# Patient Record
Sex: FEMALE | Race: BLACK OR AFRICAN AMERICAN | NOT HISPANIC OR LATINO | Employment: FULL TIME | URBAN - METROPOLITAN AREA
[De-identification: names, ages, dates, MRNs, and addresses within clinical notes are randomized per-mention and may not be internally consistent; named-entity substitution may affect disease eponyms.]

---

## 2022-06-08 ENCOUNTER — OFFICE VISIT (OUTPATIENT)
Dept: URGENT CARE | Facility: CLINIC | Age: 52
End: 2022-06-08
Payer: OTHER MISCELLANEOUS

## 2022-06-08 VITALS
SYSTOLIC BLOOD PRESSURE: 149 MMHG | HEART RATE: 91 BPM | DIASTOLIC BLOOD PRESSURE: 70 MMHG | BODY MASS INDEX: 41.36 KG/M2 | WEIGHT: 205.13 LBS | RESPIRATION RATE: 16 BRPM | HEIGHT: 59 IN | OXYGEN SATURATION: 98 %

## 2022-06-08 DIAGNOSIS — S43.491A OTHER SPRAIN OF RIGHT SHOULDER JOINT, INITIAL ENCOUNTER: Primary | ICD-10-CM

## 2022-06-08 PROCEDURE — 73030 XR SHOULDER TRAUMA 3 VIEW RIGHT: ICD-10-PCS | Mod: RT,S$GLB,, | Performed by: RADIOLOGY

## 2022-06-08 PROCEDURE — 73030 X-RAY EXAM OF SHOULDER: CPT | Mod: RT,S$GLB,, | Performed by: RADIOLOGY

## 2022-06-08 PROCEDURE — 99203 OFFICE O/P NEW LOW 30 MIN: CPT | Mod: S$GLB,,, | Performed by: EMERGENCY MEDICINE

## 2022-06-08 PROCEDURE — 99203 PR OFFICE/OUTPT VISIT, NEW, LEVL III, 30-44 MIN: ICD-10-PCS | Mod: S$GLB,,, | Performed by: EMERGENCY MEDICINE

## 2022-06-08 RX ORDER — NAPROXEN 500 MG/1
500 TABLET ORAL 2 TIMES DAILY WITH MEALS
Qty: 20 TABLET | Refills: 0 | Status: SHIPPED | OUTPATIENT
Start: 2022-06-08 | End: 2022-06-18

## 2022-06-08 RX ORDER — METHOCARBAMOL 500 MG/1
1000 TABLET, FILM COATED ORAL 3 TIMES DAILY
Qty: 42 TABLET | Refills: 0 | Status: SHIPPED | OUTPATIENT
Start: 2022-06-08 | End: 2022-06-15

## 2022-06-08 NOTE — LETTER
Urgent Care - [address] [address]  Phone: [phone]  Fax: [phone]  [facility]: [id number]    Pt Name: Holly Julian  Injury Date: 06/06/2022   Employee ID: [id number] Date of First Treatment: 06/08/2022   Company:[employer]      Appointment Time: 02:45 PM Arrived: 02:45 PM   Provider: Issac Salmon MD Time Out:04:19 PM     Office Treatment:   1. Other sprain of right shoulder joint, initial encounter      Medications Ordered This Encounter   Medications    methocarbamoL (ROBAXIN) 500 MG Tab    naproxen (NAPROSYN) 500 MG tablet      Patient Instructions: Attention not to aggravate affected area, Daily home exercises/warm soaks, Apply ice 24-48 hours then apply heat/warm soaks    Restrictions: No lifting/pushing/pulling more than 10 lbs, No above the shoulder/overhead work, Limited use of right hand and arm, No driving company vehicles     Return Appointment: 6/15/2022 at 10:00 AM    BG
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
## 2022-06-15 ENCOUNTER — OFFICE VISIT (OUTPATIENT)
Dept: URGENT CARE | Facility: CLINIC | Age: 52
End: 2022-06-15
Payer: OTHER MISCELLANEOUS

## 2022-06-15 VITALS
SYSTOLIC BLOOD PRESSURE: 141 MMHG | DIASTOLIC BLOOD PRESSURE: 54 MMHG | HEART RATE: 98 BPM | RESPIRATION RATE: 16 BRPM | OXYGEN SATURATION: 99 %

## 2022-06-15 DIAGNOSIS — M25.531 PAIN IN WRIST, RIGHT: ICD-10-CM

## 2022-06-15 DIAGNOSIS — S43.491D OTHER SPRAIN OF RIGHT SHOULDER JOINT, SUBSEQUENT ENCOUNTER: Primary | ICD-10-CM

## 2022-06-15 DIAGNOSIS — M25.529 ARTHRALGIA OF UPPER ARM, UNSPECIFIED LATERALITY: ICD-10-CM

## 2022-06-15 PROCEDURE — 99214 PR OFFICE/OUTPT VISIT, EST, LEVL IV, 30-39 MIN: ICD-10-PCS | Mod: S$GLB,,, | Performed by: NURSE PRACTITIONER

## 2022-06-15 PROCEDURE — 99214 OFFICE O/P EST MOD 30 MIN: CPT | Mod: S$GLB,,, | Performed by: NURSE PRACTITIONER

## 2022-06-15 RX ORDER — PREDNISONE 20 MG/1
40 TABLET ORAL DAILY
Qty: 8 TABLET | Refills: 0 | Status: SHIPPED | OUTPATIENT
Start: 2022-06-15 | End: 2022-06-19
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
## 2022-06-15 NOTE — LETTER
Urgent Care - [address] [address]  Phone: [phone]  Fax: [phone]  [facility]: [id number]    Pt Name: Holly Julian  Injury Date: 06/06/2022   Employee ID:  Date of First Treatment: 06/15/2022   Company: [employer] [facility] [employer]      Appointment Time: 10:00 AM Arrived: 09:45 AM   Provider: ELLIS Yepez Time Out: 10:45 AM     Office Treatment:   1. Other sprain of right shoulder joint, subsequent encounter    2. Arthralgia of upper arm, unspecified laterality    3. Pain in wrist, right      Medications Ordered This Encounter   Medications    predniSONE (DELTASONE) 20 MG tablet      Patient Instructions: Attention not to aggravate affected area, Daily home exercises/warm soaks, Apply ice 24-48 hours then apply heat/warm soaks, Elevated affected area    Restrictions: No lifting/pushing/pulling more than 10 lbs, Avoid frequent bending/lifting/twisting, No driving company vehicles, No above the shoulder/overhead work, Limited use of right hand and arm     Return Appointment: 6/22/2022 at 12:30 pm    BG

## 2022-06-22 ENCOUNTER — OFFICE VISIT (OUTPATIENT)
Dept: URGENT CARE | Facility: CLINIC | Age: 52
End: 2022-06-22
Payer: OTHER MISCELLANEOUS

## 2022-06-22 DIAGNOSIS — M25.529 ARTHRALGIA OF UPPER ARM, UNSPECIFIED LATERALITY: ICD-10-CM

## 2022-06-22 DIAGNOSIS — S43.491D OTHER SPRAIN OF RIGHT SHOULDER JOINT, SUBSEQUENT ENCOUNTER: Primary | ICD-10-CM

## 2022-06-22 PROCEDURE — 99214 PR OFFICE/OUTPT VISIT, EST, LEVL IV, 30-39 MIN: ICD-10-PCS | Mod: S$GLB,,, | Performed by: EMERGENCY MEDICINE

## 2022-06-22 PROCEDURE — 99214 OFFICE O/P EST MOD 30 MIN: CPT | Mod: S$GLB,,, | Performed by: EMERGENCY MEDICINE

## 2022-06-22 RX ORDER — NAPROXEN 500 MG/1
500 TABLET ORAL 2 TIMES DAILY WITH MEALS
Qty: 20 TABLET | Refills: 0 | Status: SHIPPED | OUTPATIENT
Start: 2022-06-22 | End: 2022-07-06

## 2022-06-22 RX ORDER — METHOCARBAMOL 500 MG/1
500 TABLET, FILM COATED ORAL 3 TIMES DAILY
Qty: 30 TABLET | Refills: 0 | Status: SHIPPED | OUTPATIENT
Start: 2022-06-22 | End: 2022-07-02

## 2022-06-22 NOTE — LETTER
Urgent Care - [address] [address]  Phone: [phone]  Fax: [phone]  [facility]: [id number]    Pt Name: Holly Julian  Injury Date: 06/06/2022   Employee ID: [id number] Date of  Treatment: 06/22/2022   Company: [employer]      Appointment Time: 12:30 PM Arrived: 12:25 PM   Provider: Issac Salmon MD Time Out: 2:37 PM     Office Treatment:   1. Other sprain of right shoulder joint, subsequent encounter    2. Arthralgia of upper arm, unspecified laterality      Medications Ordered This Encounter   Medications    methocarbamoL (ROBAXIN) 500 MG Tab    naproxen (NAPROSYN) 500 MG tablet      Patient Instructions: Attention not to aggravate affected area, Daily home exercises/warm soaks, Apply ice 24-48 hours then apply heat/warm soaks, Elevated affected area    Restrictions: No lifting/pushing/pulling more than 10 lbs, No above the shoulder/overhead work, Limited use of right hand and arm. No driving.     Return Appointment: 7/6/2022 at 10:30 AM

## 2022-06-22 NOTE — LETTER
Urgent Care - [address] [address]  Phone: [phone]  Fax: [phone]  [facility]: [id number]    Pt Name: Holly Julian  Injury Date: 06/06/2022   Employee ID:[id number] Date of Treatment: 06/22/2022   Company:[employer]      Appointment Time: 12:30 PM Arrived: 12:25 PM    Provider: Issac Salmon MD Time Out: 2:37 PM     Office Treatment:   1. Other sprain of right shoulder joint, subsequent encounter    2. Arthralgia of upper arm, unspecified laterality      Medications Ordered This Encounter   Medications    methocarbamoL (ROBAXIN) 500 MG Tab    naproxen (NAPROSYN) 500 MG tablet      Patient Instructions: Attention not to aggravate affected area, Daily home exercises/warm soaks, Apply ice 24-48 hours then apply heat/warm soaks, Elevated affected area    Restrictions: No lifting/pushing/pulling more than 10 lbs, No above the shoulder/overhead work, Limited use of right hand and arm     Return Appointment: 7/6/2022 at 10:30 AM

## 2022-06-22 NOTE — PROGRESS NOTES
Subjective:       Patient ID: Holly Julian is a 52 y.o. female.    Chief Complaint: Arm Injury (Right wrist)    RV, Right Shoulder,06/06/22, Joseph. Patient is still having throbbing pain but it isn't constant. Patient states the pain can come with or without movements. Patient states she is taking medicine for injury but can not recall what she is taking. Patient states the pain radiates down to the wrist. Patient still can not lift the right arm up as much.  Pain level 7/10 today.    Reports significant improvement however not complete resolution.  She has been working light duty without difficulty.  Extremes of range of motion and any heavy lifting does cause aching pain to the anteromedial aspect of the shoulder girdle.  No pain on external or internal rotation and do not feel labrum rotator cuff pathology involved.  I have reviewed the x-rays and are negative for acute bony abnormality.  Will refill anti-inflammatory and nonsedating muscle relaxer and have her follow-up in 2 weeks.  Discussed at that point that she would either be discharged or started with physical therapy depending on her progress.  Return to clinic 2 weeks.    Arm Injury   The incident occurred more than 1 week ago. The incident occurred at work. The injury mechanism was twisted. The pain is present in the right shoulder and right wrist. Quality: throbbing. The pain radiates to the right hand. The pain is at a severity of 7/10. The pain is mild. The pain has been intermittent since the incident. Pertinent negatives include no chest pain, muscle weakness, numbness or tingling. The symptoms are aggravated by movement. She has tried ice and heat for the symptoms. The treatment provided mild relief.       ROS    Constitution: Negative for chills, fatigue and fever.   HENT: Negative for ear pain, sinus pain and sore throat.    Neck: Negative for neck pain and neck stiffness.   Cardiovascular: Negative for chest pain, palpitations and sob on  exertion.   Eyes: Negative for eye pain and vision loss.   Respiratory: Negative for cough, shortness of breath and asthma.    Gastrointestinal: Negative for abdominal pain, nausea, vomiting and diarrhea.   Genitourinary: Negative for dysuria, frequency and hematuria.   Musculoskeletal: Positive for pain and abnormal ROM of joint. Negative for trauma, joint pain, joint swelling, arthritis, gout, back pain, muscle cramps, muscle ache and history of spine disorder.   Skin: Negative for rash, wound and erythema.   Allergic/Immunologic: Negative for seasonal allergies and asthma.   Neurological: Negative for dizziness, light-headedness, altered mental status, numbness and tingling.   Psychiatric/Behavioral: Negative for altered mental status and confusion.        Objective:      Physical Exam  Vitals and nursing note reviewed.   Constitutional:       Appearance: She is well-developed.   HENT:      Head: Normocephalic and atraumatic. No abrasion, contusion or laceration.      Right Ear: External ear normal.      Left Ear: External ear normal.      Nose: Nose normal.   Eyes:      General: Lids are normal.      Conjunctiva/sclera: Conjunctivae normal.      Pupils: Pupils are equal, round, and reactive to light.   Neck:      Trachea: Trachea and phonation normal.   Cardiovascular:      Rate and Rhythm: Normal rate and regular rhythm.      Heart sounds: Normal heart sounds.   Pulmonary:      Effort: Pulmonary effort is normal. No respiratory distress.      Breath sounds: Normal breath sounds. No stridor.   Musculoskeletal:         General: Tenderness and signs of injury present. No swelling.      Right shoulder: Tenderness present. Decreased range of motion.      Left shoulder: Normal.      Right wrist: Tenderness (worse with flexion and extension) present.      Cervical back: Full passive range of motion without pain and neck supple.   Skin:     General: Skin is warm and dry.      Capillary Refill: Capillary refill takes  less than 2 seconds.      Findings: No abrasion, bruising, burn, ecchymosis, erythema, laceration, lesion or rash.   Neurological:      Mental Status: She is alert and oriented to person, place, and time.   Psychiatric:         Speech: Speech normal.         Behavior: Behavior normal.         Thought Content: Thought content normal.         Judgment: Judgment normal.         Assessment:       1. Other sprain of right shoulder joint, subsequent encounter    2. Arthralgia of upper arm, unspecified laterality        Plan:       Reports significant improvement however not complete resolution.  She has been working light duty without difficulty.  Extremes of range of motion and any heavy lifting does cause aching pain to the anteromedial aspect of the shoulder girdle.  No pain on external or internal rotation and do not feel labrum rotator cuff pathology involved.  I have reviewed the x-rays and are negative for acute bony abnormality.  Will refill anti-inflammatory and nonsedating muscle relaxer and have her follow-up in 2 weeks.  Discussed at that point that she would either be discharged or started with physical therapy depending on her progress.  Return to clinic 2 weeks.  Medications Ordered This Encounter   Medications    methocarbamoL (ROBAXIN) 500 MG Tab     Sig: Take 1 tablet (500 mg total) by mouth 3 (three) times daily. for 10 days     Dispense:  30 tablet     Refill:  0    naproxen (NAPROSYN) 500 MG tablet     Sig: Take 1 tablet (500 mg total) by mouth 2 (two) times daily with meals. for 14 days     Dispense:  20 tablet     Refill:  0     Patient Instructions: Attention not to aggravate affected area, Daily home exercises/warm soaks, Apply ice 24-48 hours then apply heat/warm soaks, Elevated affected area   Restrictions: No lifting/pushing/pulling more than 10 lbs, No above the shoulder/overhead work, Limited use of right hand and arm  Follow up in about 2 weeks (around 7/6/2022).

## 2022-07-06 ENCOUNTER — OFFICE VISIT (OUTPATIENT)
Dept: URGENT CARE | Facility: CLINIC | Age: 52
End: 2022-07-06
Payer: OTHER MISCELLANEOUS

## 2022-07-06 VITALS
BODY MASS INDEX: 41.33 KG/M2 | RESPIRATION RATE: 16 BRPM | HEIGHT: 59 IN | DIASTOLIC BLOOD PRESSURE: 62 MMHG | SYSTOLIC BLOOD PRESSURE: 146 MMHG | OXYGEN SATURATION: 98 % | HEART RATE: 87 BPM | WEIGHT: 205 LBS

## 2022-07-06 DIAGNOSIS — M25.531 PAIN IN WRIST, RIGHT: ICD-10-CM

## 2022-07-06 DIAGNOSIS — Z02.6 ENCOUNTER RELATED TO WORKER'S COMPENSATION CLAIM: Primary | ICD-10-CM

## 2022-07-06 DIAGNOSIS — S43.491D OTHER SPRAIN OF RIGHT SHOULDER JOINT, SUBSEQUENT ENCOUNTER: ICD-10-CM

## 2022-07-06 DIAGNOSIS — M25.529 ARTHRALGIA OF UPPER ARM, UNSPECIFIED LATERALITY: ICD-10-CM

## 2022-07-06 PROCEDURE — 99214 OFFICE O/P EST MOD 30 MIN: CPT | Mod: S$GLB,,, | Performed by: NURSE PRACTITIONER

## 2022-07-06 PROCEDURE — 99214 PR OFFICE/OUTPT VISIT, EST, LEVL IV, 30-39 MIN: ICD-10-PCS | Mod: S$GLB,,, | Performed by: NURSE PRACTITIONER
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
## 2022-07-06 NOTE — LETTER
Urgent Care - [address] [address]  Phone: [phone]  Fax: [phone]  [facility] [employer] [facility]: [id number]    Pt Name: Holly Julian  Injury Date: 06/06/2022   Employee ID: [id number] Date of Treatment: 07/06/2022   Company: [employer]      Appointment Time: 10:30 AM Arrived: 10:26 AM   Provider: ELLIS Yepez Time Out: 11:15 AM     Office Treatment:   1. Encounter related to worker's compensation claim    2. Other sprain of right shoulder joint, subsequent encounter    3. Arthralgia of upper arm, unspecified laterality    4. Pain in wrist, right          Patient Instructions: Attention not to aggravate affected area, Daily home exercises/warm soaks, Apply ice 24-48 hours then apply heat/warm soaks, PT to be scheduled once authorized    Restrictions: Regular Duty (on 7/11/2022)     Return Appointment: 7/20/2022 at 09:00 AM

## 2022-07-13 ENCOUNTER — CLINICAL SUPPORT (OUTPATIENT)
Dept: REHABILITATION | Facility: OTHER | Age: 52
End: 2022-07-13
Payer: OTHER MISCELLANEOUS

## 2022-07-13 DIAGNOSIS — M25.611 DECREASED RIGHT SHOULDER RANGE OF MOTION: ICD-10-CM

## 2022-07-13 DIAGNOSIS — M25.511 ACUTE PAIN OF RIGHT SHOULDER: ICD-10-CM

## 2022-07-13 DIAGNOSIS — M62.81 MUSCLE WEAKNESS OF UPPER EXTREMITY: ICD-10-CM

## 2022-07-13 PROCEDURE — 97161 PT EVAL LOW COMPLEX 20 MIN: CPT | Mod: PN

## 2022-07-14 ENCOUNTER — CLINICAL SUPPORT (OUTPATIENT)
Dept: REHABILITATION | Facility: OTHER | Age: 52
End: 2022-07-14
Payer: OTHER MISCELLANEOUS

## 2022-07-14 DIAGNOSIS — M62.81 MUSCLE WEAKNESS OF UPPER EXTREMITY: ICD-10-CM

## 2022-07-14 DIAGNOSIS — M25.611 DECREASED RIGHT SHOULDER RANGE OF MOTION: ICD-10-CM

## 2022-07-14 DIAGNOSIS — M25.511 ACUTE PAIN OF RIGHT SHOULDER: Primary | ICD-10-CM

## 2022-07-14 PROCEDURE — 97110 THERAPEUTIC EXERCISES: CPT | Mod: PN,CQ

## 2022-07-14 PROCEDURE — 97140 MANUAL THERAPY 1/> REGIONS: CPT | Mod: PN,CQ

## 2022-07-14 NOTE — PLAN OF CARE
[facility]  Physical Therapy Initial Evaluation    Name: Holly Julian  [facility] Number: [id number]    Therapy Diagnosis:   Encounter Diagnoses   Name Primary?    Acute pain of right shoulder     Decreased right shoulder range of motion     Muscle weakness of upper extremity      Physician: Alanna Marquez FNP    Physician Orders: PT Eval and Treat   Medical Diagnosis from Referral:   S43.491D (ICD-10-CM) - Other sprain of right shoulder joint, subsequent encounter  M25.529 (ICD-10-CM) - Arthralgia of upper arm, unspecified laterality  M25.531 (ICD-10-CM) - Pain in wrist, right  Evaluation Date: 2022  Authorization Period Expiration: 2023  Plan of Care Expiration: 2022  Progress Note Due: 8/10/2022  Visit # / Visits authorized:     Time In: 3:10 pm  Time Out: 3:50 pm  Total BillableTime separate from evaluation: 00 minutes    Precautions: Standard    Subjective   Date of injury: 2022  History of current condition - Holly reports: injuring her R UE while driving at work and the steering wheel locked on her as as she was turning the wheel. 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    Allergies:   Review of patient's allergies indicates:  No Known Allergies     Imagin2022  X-ray  FINDINGS:  The bone mineralization is within normal limits.  There is no cortical step-off.  There is no evidence of periostitis.     The glenohumeral articulation is maintained.  There is  "arthropathy of the acromioclavicular joint.  The coracoclavicular interval is within normal limits.     The visualized right hemithorax is unremarkable.  There is no evidence of a pneumothorax or pulmonary contusion.    Social History:  lives with their spouse    Pain:  Current 4/10, worst 8/10, best 3/10   Location: right shoulder(s)  Description: Aching   Aggravating Factors: reaching up, reaching forward, reaching behind her back, dressing, tucking shirt into pants, and carrying.  Alleviating Factors: ice, heating pad and analgesic rub, Meloxicam, muscle relaxers    Pt's goals: "get to 90% to 100% moving this arm. Get it back to where it was."             Objective       Functional Job Specific Testing:     Job Specific Task Job Demands Current Ability Deficit? (Yes or No)   1. Turning the steering wheel continuous With pain yes   2. Reach forward for door controls continuous Not currently performing this yes   3. Sitting continuous Currently performing yes       UE MMT R L   C3 Cervical side bend 5/5 5/5   C4 Shoulder Shrug 5/5  5/5   Shoulder flexion 3/5 5/5   Shoulder abduction 3-/5 5/5   Shoulder IR 5/5 5/5   Shoulder ER 5/5 5/5   C5 Elbow flexion 5/5 5/5   C7 Elbow extension 5/5 5/5   C6 Wrist extension 5/5 5/5   Wrist flexion 5/5 5/5   Scapular protraction 3/5 4/5   Mid Traps 3-/5 3-/5   Lower traps 3-/5 3-/5     Joint Mobility:                                                  R                        L  Shoulder flexion                                 130 deg/150 deg           full  Shoulder abd                                     110 deg/170 deg           full  Shoulder ER                                             65 deg                  75 deg    Shoulder IR                                              40 deg                  60 deg  Shoulder ER at 90 deg abd                      75 deg                  90 deg      UE Special Tests    [place]-Luis negative   Neer Impingement Positive R UE "   Bell's negative   Empty Can negative       Limitation/Restriction for FOTO Shoulder Survey    Therapist reviewed FOTO scores for Holly Julian on 7/13/2022.   FOTO documents entered into [employer] - see Media section.    Limitation Score: 60%       TREATMENT     Home Exercises and Patient Education Provided    Education provided:   - Discussed the role of the PTA on the Rehab Team. Discussed patient will be seen by a physical therapist minimally every 6th visit or every 30 days prior to being seen by PTA. Also discussed the use of the [facility] for communication.      Assessment   Holly is a 52 y.o. female referred to outpatient Physical Therapy with a medical diagnosis of S43.491D (ICD-10-CM) - Other sprain of right shoulder joint, subsequent encounter, M25.529 (ICD-10-CM) - Arthralgia of upper arm, unspecified laterality, M25.531 (ICD-10-CM) - Pain in wrist, right. Pt presents with positive testing for R shoulder impingment affecting R shoulder ROM, presenting with decreased scapular stabilization and strength affecting R shoulder AROM and function. 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    The patient's current job specific task deficits include the following: turing a steering wheel, sitting and reaching forward, reaching overhead, assisting students in wheelchairs.    Pt prognosis is Good.     Skilled Physical Therapy intervention is required at this time for the injured worker to address the musculoskeletal limitations and work-related functional deficits for their job as a .    Plan of care discussed with patient: Yes  Pt's spiritual, cultural and educational needs considered and patient is agreeable to the plan of care and goals as stated below:     Anticipated Barriers for therapy: cannot come on Mondays    Medical Necessity is demonstrated by the  following  History  Co-morbidities and personal factors that may impact the plan of care Co-morbidities:   none    Personal Factors:   no deficits     low   Examination  Body Structures and Functions, activity limitations and participation restrictions that may impact the plan of care Body Regions:   upper extremities    Body Systems:    ROM  strength  gross coordinated movement    Participation Restrictions:   none    Activity limitations:   Learning and applying knowledge  no deficits    General Tasks and Commands  no deficits    Communication  no deficits    Mobility  lifting and carrying objects  moving around using equipment (WC)    Self care  no deficits    Domestic Life  no deficits    Interactions/Relationships  no deficits    Life Areas  no deficits    Community and Social Life  no deficits         moderate   Clinical Presentation stable and uncomplicated low   Decision Making/ Complexity Score: low     Goals:     Short Term Goals: 2 weeks   1. Increase R shoulder AROM to 160 deg for patient to reach overhead to press buttons in the shuttle bus at work.  2. Increased R shoulder strength to 4/5 for patient to reach buttons and dials in the shuttle bus at work.    Long Term Goals: 4 weeks   3. Patient to freely turning steering wheel of the shuttle bus without restrictions  4. 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    Plan   Plan of care Certification: 7/13/2022 to 9/7/2022.    Outpatient Physical Therapy 3 times weekly for 8 weeks to include the following interventions: Manual Therapy, Moist Heat/ Ice, Neuromuscular Re-ed, Patient Education, Therapeutic Activities and Therapeutic Exercise.     Upon discharge from further skilled PT intervention it will determined if the need for a work conditioning program or Functional Capacity Evaluation is required to allow the injured worker to return to work with full potential achieved, continued improvement with body mechanics with advanced  functional activities, and further prevention of future work-related injuries.     Jose Wagoner, PT  7/14/2022

## 2022-07-14 NOTE — PROGRESS NOTES
"[facility]   Physical Therapy Treatment Note     Name: Holly HEREDIA [facility] Number: [id number]    Therapy Diagnosis:   Encounter Diagnoses   Name Primary?    Acute pain of right shoulder Yes    Decreased right shoulder range of motion     Muscle weakness of upper extremity      Physician: Alanna Marquez FNP    Visit Date: 7/14/2022         Physician Orders: PT Eval and Treat   Medical Diagnosis from Referral:   S43.491D (ICD-10-CM) - Other sprain of right shoulder joint, subsequent encounter  M25.529 (ICD-10-CM) - Arthralgia of upper arm, unspecified laterality  M25.531 (ICD-10-CM) - Pain in wrist, right  Evaluation Date: 7/13/2022  Authorization Period Expiration: 7/6/2023  Plan of Care Expiration: 9/7/2022  Progress Note Due: 8/10/2022  Visit # / Visits authorized: 2/ 1  PTA Visit #: 1/5     Time In: 3:45 PM   Time Out: 4:30 PM  Total Billable Time: 40 minutes    SUBJECTIVE     Pt reports: "sometimes I get shooting pain, but my shoulder is just aching today". Pt agreeable to PT session   She will be compliant with home exercise program.  Response to previous treatment: initial evaluation previous session   Functional change: none reported    Pain: 7/10 (ache)  Location: right shoulder (lateral / anterior)    OBJECTIVE     Objective Measures updated at progress report unless specified.     Treatment     Holly received the treatments listed below:      Holly participated therapeutic exercises to develop strength, ROM and flexibility for 25  minutes including:  -Wand      Flexion  2x10 1# wand   Chest press 2x10 1# wand   R ER  2x10 1# wand  -Pulleys flex/abd 2 min ea  -Wall washing  Fwd with towel, x15 Flex    Holly received manual therapy techniques: Joint mobilizations and Soft tissue massage were applied to the: R shoulder for 15 minutes, including:  STM to R UT  STM to biceps long head and general anterior shoulder  Lateral R shoulder telescoping  PROM all planes tolerable "   GHT jt mobilization (anterior, lateral, inferior) grade III    Patient Education and Home Exercises     Home Exercises Provided and Patient Education Provided     Education provided:   -shoulder WHEP    Written Home Exercises Provided: yes. Exercises were reviewed and Holly was able to demonstrate them prior to the end of the session.  Holly demonstrated good  understanding of the education provided. See EMR under Patient Instructions for exercises provided during therapy sessions    ASSESSMENT     Pt completed first session after initial evaluation today. She was provided with verbal/ tactile instructions for proper performance of supine/ standing activities. She did not report any complaints of increased R shoulder pain at end of today's session. Pt with tenderness to palpation R UT and anterior R shoulder (over long head of biceps mm)    Holly Is progressing well towards her goals.   Pt prognosis is Good.     Pt will continue to benefit from skilled outpatient physical therapy to address the deficits listed in the problem list box on initial evaluation, provide pt/family education and to maximize pt's level of independence in the home and community environment.     Pt's spiritual, cultural and educational needs considered and pt agreeable to plan of care and goals.     Anticipated barriers to physical therapy: see precautions    Goals:   Short Term Goals: 2 weeks   1. 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  2. Increased R shoulder strength to 4/5 for patient to reach buttons and dials in the shuttle bus at work.(ongoing, not met).     Long Term Goals: 4 weeks   3. Patient to freely turning steering wheel of the shuttle bus without restrictions.(ongoing, not met)  4. 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 Monitor  response to session and advance as appropriate.    Bert Subramanian, PTA

## 2022-07-19 ENCOUNTER — CLINICAL SUPPORT (OUTPATIENT)
Dept: REHABILITATION | Facility: OTHER | Age: 52
End: 2022-07-19
Payer: OTHER MISCELLANEOUS

## 2022-07-19 DIAGNOSIS — M25.511 ACUTE PAIN OF RIGHT SHOULDER: Primary | ICD-10-CM

## 2022-07-19 DIAGNOSIS — M62.81 MUSCLE WEAKNESS OF UPPER EXTREMITY: ICD-10-CM

## 2022-07-19 DIAGNOSIS — M25.611 DECREASED RIGHT SHOULDER RANGE OF MOTION: ICD-10-CM

## 2022-07-19 PROCEDURE — 97140 MANUAL THERAPY 1/> REGIONS: CPT | Mod: PN

## 2022-07-19 PROCEDURE — 97110 THERAPEUTIC EXERCISES: CPT | Mod: PN

## 2022-07-19 NOTE — PROGRESS NOTES
"[facility]   Physical Therapy Treatment Note     Name: Holly Julian  [facility] Number: [id number]    Therapy Diagnosis:   Encounter Diagnoses   Name Primary?    Acute pain of right shoulder Yes    Decreased right shoulder range of motion     Muscle weakness of upper extremity      Physician: Alanna Marquez FNP    Visit Date: 7/19/2022     Physician Orders: PT Eval and Treat   Medical Diagnosis from Referral:   S43.491D (ICD-10-CM) - Other sprain of right shoulder joint, subsequent encounter  M25.529 (ICD-10-CM) - Arthralgia of upper arm, unspecified laterality  M25.531 (ICD-10-CM) - Pain in wrist, right  Evaluation Date: 7/13/2022  Authorization Period Expiration: 7/6/2023  Plan of Care Expiration: 9/7/2022  Progress Note Due: 8/10/2022  Visit # / Visits authorized: 3/9  PTA Visit #: 1/5     Time In: 04:01pm  Time Out: 05:05pm  Total Billable Time: 64 minutes    SUBJECTIVE     HOME EXERCISE PROGRAM: no comment   Response to previous treatment: R shoulder remains painful and L shoulder feels stiff  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     Pain: 7/10 (ache)  Location: right shoulder (lateral / anterior)    OBJECTIVE     Objective Measures updated at progress report unless specified.     Treatment   Charges based on 1-1 tx:  therapeutic exercises for 43 minutes:  -Wand      Flexion  2x10 1# wand, 5" hold   Chest press 3x10 1# wand   R ER  2x10 1# wand, 5" hold  -Pulleys flex/abd: 3min   -Wall washing: Abd semi [place] with furniture slider on wall x20 each arm   - Patient education on orienting thumb upwards while reaching for work tasks     manual therapy techniques for 15 minutes:  R/L GLENOHUMERAL JOINT Gr III/IV JM     Therapeutic activities for 6 minutes:   +UBE, forward 3'/ retro 3', 1.5 resistance     Patient Education and Home Exercises     Home Exercises Provided and Patient " Education Provided     Education provided:   yes    Home Exercises Provided: yes. Exercises were reviewed and Holly was able to demonstrate them prior to the end of the session.  Holly demonstrated good  understanding of the education provided. See EMR under Patient Instructions for exercises provided during therapy sessions    ASSESSMENT   Patient requires verbal/ tactile cuing for proper performance of supine/ standing exercises but she is able to complete with no adverse s/s at her shoulders. UBE and abduction wall slides initiated today to progress work related strength and mobility training. Patient presents with springy/leathery end feel at R GLENOHUMERAL JOINT during manual intervention.     Holly is making good progress towards meeting set goals.    Pt prognosis is Good.     Pt will continue to benefit from skilled outpatient physical therapy to address the deficits listed in the problem list box on initial evaluation, provide pt/family education and to maximize pt's level of independence in the home and community environment.     Pt's spiritual, cultural and educational needs considered and pt agreeable to plan of care and goals.     Anticipated barriers to physical therapy: see precautions    Goals:   Short Term Goals: 2 weeks   1. 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  2. Increased R shoulder strength to 4/5 for patient to reach buttons and dials in the shuttle bus at work.(ongoing, not met).     Long Term Goals: 4 weeks   3. Patient to freely turning steering wheel of the shuttle bus without restrictions.(ongoing, not met)  4. 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 Monitor response to session and advance as appropriate.    Joanne Duke, PT

## 2022-07-20 ENCOUNTER — OFFICE VISIT (OUTPATIENT)
Dept: URGENT CARE | Facility: CLINIC | Age: 52
End: 2022-07-20
Payer: OTHER MISCELLANEOUS

## 2022-07-20 DIAGNOSIS — M25.531 PAIN IN WRIST, RIGHT: ICD-10-CM

## 2022-07-20 DIAGNOSIS — S43.491D OTHER SPRAIN OF RIGHT SHOULDER JOINT, SUBSEQUENT ENCOUNTER: ICD-10-CM

## 2022-07-20 DIAGNOSIS — Z02.6 ENCOUNTER RELATED TO WORKER'S COMPENSATION CLAIM: Primary | ICD-10-CM

## 2022-07-20 DIAGNOSIS — M25.529 ARTHRALGIA OF UPPER ARM, UNSPECIFIED LATERALITY: ICD-10-CM

## 2022-07-20 PROCEDURE — 99214 OFFICE O/P EST MOD 30 MIN: CPT | Mod: S$GLB,,, | Performed by: NURSE PRACTITIONER

## 2022-07-20 PROCEDURE — 99214 PR OFFICE/OUTPT VISIT, EST, LEVL IV, 30-39 MIN: ICD-10-PCS | Mod: S$GLB,,, | Performed by: NURSE PRACTITIONER

## 2022-07-20 RX ORDER — METHOCARBAMOL 500 MG/1
1000 TABLET, FILM COATED ORAL 3 TIMES DAILY PRN
Qty: 30 TABLET | Refills: 0 | Status: SHIPPED | OUTPATIENT
Start: 2022-07-20 | End: 2022-07-27

## 2022-07-20 NOTE — LETTER
Urgent Care - [address] [address]  Phone: [phone]  Fax: [phone]  [facility]: [id number]    Pt Name: Holly Julian  Injury Date: 06/06/2022   Employee ID: [id number] Date of  Treatment: 07/20/2022   Company: [employer]      Appointment Time: 08:45 AM Arrived: 1025   Provider: ELLIS Yepez Time Out:1145     Office Treatment:   1. Encounter related to worker's compensation claim    2. Other sprain of right shoulder joint, subsequent encounter    3. Arthralgia of upper arm, unspecified laterality    4. Pain in wrist, right      Medications Ordered This Encounter   Medications    methocarbamoL (ROBAXIN) 500 MG Tab      Patient Instructions: Daily home exercises/warm soaks, Continue Physical Therapy    Restrictions: Regular Duty     Return Appointment: Visit date not found at 8/10/2022  1030 am

## 2022-07-20 NOTE — PROGRESS NOTES
Subjective:       Patient ID: Holly Julian is a 52 y.o. female.    Chief Complaint: Shoulder Injury    RV, Right Shoulder,06/06/22, Joseph- Pain level is 6/10 on today. She had already completed two PT session. She has aching pain if she has to raise her arms up. 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 She states it's pain with certain movement. She is taking muscle relaxer which helps at night. BG    Patient reports improved rom. She has been driving the small bus.     Shoulder Injury   The incident occurred at work. The right shoulder is affected. The incident occurred more than 1 week ago. The quality of the pain is described as aching. The pain does not radiate. The pain is at a severity of 6/10. The patient is experiencing no pain. Pertinent negatives include no chest pain, muscle weakness, numbness or tingling.   Arm Injury   The incident occurred more than 1 week ago. The incident occurred at work. The injury mechanism was twisted. The pain is present in the right shoulder and right wrist. Quality: throbbing. The pain radiates to the right hand. The pain is at a severity of 3/10. The pain is mild. The pain has been intermittent since the incident. Pertinent negatives include no chest pain, muscle weakness, numbness or tingling. The symptoms are aggravated by movement. She has tried ice and heat for the symptoms. The treatment provided mild relief.       Constitution: Negative.   HENT: Negative.    Cardiovascular: Negative.  Negative for chest pain.   Respiratory: Negative.    Gastrointestinal: Negative.    Endocrine: negative.   Genitourinary: Negative.  Negative for frequency and urgency.   Musculoskeletal: Negative.  Positive for pain and abnormal ROM of joint.   Skin: Negative.    Allergic/Immunologic: Negative.    Neurological: Negative.  Negative for numbness.   Hematologic/Lymphatic: Negative.    Psychiatric/Behavioral: Negative.         Objective:      Physical  Exam  Vitals and nursing note reviewed.   Constitutional:       General: She is not in acute distress.     Appearance: She is well-developed. She is not diaphoretic.   HENT:      Head: Normocephalic and atraumatic.      Right Ear: Hearing and external ear normal.      Left Ear: Hearing and external ear normal.      Nose: Nose normal. No nasal deformity.   Eyes:      General: Lids are normal. No scleral icterus.     Conjunctiva/sclera: Conjunctivae normal.   Neck:      Trachea: Trachea normal.   Cardiovascular:      Pulses: Normal pulses.   Pulmonary:      Effort: Pulmonary effort is normal. No respiratory distress.      Breath sounds: No stridor.   Musculoskeletal:      Right shoulder: Tenderness present. Normal range of motion.      Cervical back: Normal range of motion.   Skin:     General: Skin is warm and dry.      Capillary Refill: Capillary refill takes less than 2 seconds.   Neurological:      Mental Status: She is alert. She is not disoriented.      GCS: GCS eye subscore is 4. GCS verbal subscore is 5. GCS motor subscore is 6.      Sensory: No sensory deficit.   Psychiatric:         Attention and Perception: She is attentive.         Speech: Speech normal.         Behavior: Behavior normal.         Assessment:       1. Encounter related to worker's compensation claim    2. Other sprain of right shoulder joint, subsequent encounter    3. Arthralgia of upper arm, unspecified laterality    4. Pain in wrist, right        Plan:     continue physical therapy  Will see back in august after several pt visits.   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     Dispense:  30 tablet     Refill:  0     Patient Instructions: Daily home exercises/warm soaks, Continue Physical Therapy    Restrictions: Regular Duty  Follow up in about 3 weeks (around 8/10/2022).

## 2022-07-22 ENCOUNTER — CLINICAL SUPPORT (OUTPATIENT)
Dept: REHABILITATION | Facility: OTHER | Age: 52
End: 2022-07-22
Payer: OTHER MISCELLANEOUS

## 2022-07-22 DIAGNOSIS — M62.81 MUSCLE WEAKNESS OF UPPER EXTREMITY: ICD-10-CM

## 2022-07-22 DIAGNOSIS — M25.611 DECREASED RIGHT SHOULDER RANGE OF MOTION: ICD-10-CM

## 2022-07-22 DIAGNOSIS — M25.511 ACUTE PAIN OF RIGHT SHOULDER: Primary | ICD-10-CM

## 2022-07-22 PROCEDURE — 97530 THERAPEUTIC ACTIVITIES: CPT | Mod: PN,CQ

## 2022-07-22 PROCEDURE — 97140 MANUAL THERAPY 1/> REGIONS: CPT | Mod: PN,CQ

## 2022-07-22 PROCEDURE — 97110 THERAPEUTIC EXERCISES: CPT | Mod: PN,CQ

## 2022-07-22 NOTE — PROGRESS NOTES
"[facility]   Physical Therapy Treatment Note     Name: Holly Julian  [facility] Number: [id number]    Therapy Diagnosis:   Encounter Diagnoses   Name Primary?    Acute pain of right shoulder Yes    Decreased right shoulder range of motion     Muscle weakness of upper extremity      Physician: Alanna Marquez FNP    Visit Date: 7/22/2022     Physician Orders: PT Eval and Treat   Medical Diagnosis from Referral:   S43.491D (ICD-10-CM) - Other sprain of right shoulder joint, subsequent encounter  M25.529 (ICD-10-CM) - Arthralgia of upper arm, unspecified laterality  M25.531 (ICD-10-CM) - Pain in wrist, right  Evaluation Date: 7/13/2022  Authorization Period Expiration: 7/6/2023  Plan of Care Expiration: 9/7/2022  Progress Note Due: 8/10/2022  Visit # / Visits authorized: 4/9  PTA Visit #: 2/5     Time In: 1500  Time Out: 1601  Total Billable Time: 61 minutes    SUBJECTIVE     HOME EXERCISE PROGRAM: no comment   Response to previous treatment: Still having pain in her R shoulder. States she has been performing her HEP as directed.   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     Pain: 5/10  Location: right shoulder (lateral / anterior)    OBJECTIVE     Objective Measures updated at progress report unless specified.     Treatment   Charges based on 1-1 tx:  therapeutic exercises for 45 minutes:    UBE, forward 3'/ retro 3', 1.5 resistance   -Wand      Flexion  3x10 1# wand, 5" hold   Chest press 3x10 1# wand   R ER  3x10 1# wand, 5" hold  -Pulleys flex/abd: 3min   +Rows mary TB, 2x10  +Shld ext mary TB 2x10- palms out  +ER/IR walk outs, mary TB, 2x10 ea R only   +Bicep curls 2# 3x10 R/L        manual therapy techniques for 8 minutes:  R/L GLENOHUMERAL JOINT Gr III/IV JM     Therapeutic activities for 8 minutes:   -Wall washing: Abd semi [place] with furniture slider on wall x20 each UE - " emphasis on avoiding UT involvement       Patient Education and Home Exercises     Home Exercises Provided and Patient Education Provided     Education provided:   - Avoiding impingement with OH activities.     Home Exercises Provided: Patient instructed to cont prior HEP. Exercises were reviewed and Holly was able to demonstrate them prior to the end of the session.  Holly demonstrated good  understanding of the education provided. See EMR under Patient Instructions for exercises provided during therapy sessions    ASSESSMENT   Pt tolerated exercise well this visit with minimal increase in pain beyond stated levels. Muscle weakness in deltoids/scapular muscles are notable during resistive exercises. Slight muscle guarding during was noted with AAROM as well as increased UT compensation with flexion/scaption motions. Capsule tightness was present with JM. Will progress gentle shoulder girdle strengthening as tolerated.     Holly is making good progress towards meeting set goals.    Pt prognosis is Good.     Pt will continue to benefit from skilled outpatient physical therapy to address the deficits listed in the problem list box on initial evaluation, provide pt/family education and to maximize pt's level of independence in the home and community environment.     Pt's spiritual, cultural and educational needs considered and pt agreeable to plan of care and goals.     Anticipated barriers to physical therapy: see precautions    Goals:   Short Term Goals: 2 weeks   1. 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  2. Increased R shoulder strength to 4/5 for patient to reach buttons and dials in the shuttle bus at work.(ongoing, not met).     Long Term Goals: 4 weeks   3. Patient to freely turning steering wheel of the shuttle bus without restrictions.(ongoing, not met)  4. 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 Monitor response to session and advance as appropriate.    Omid Álvarez, PTA

## 2022-07-27 ENCOUNTER — CLINICAL SUPPORT (OUTPATIENT)
Dept: REHABILITATION | Facility: OTHER | Age: 52
End: 2022-07-27
Payer: OTHER MISCELLANEOUS

## 2022-07-27 DIAGNOSIS — M62.81 MUSCLE WEAKNESS OF UPPER EXTREMITY: ICD-10-CM

## 2022-07-27 DIAGNOSIS — M25.511 ACUTE PAIN OF RIGHT SHOULDER: Primary | ICD-10-CM

## 2022-07-27 DIAGNOSIS — M25.611 DECREASED RIGHT SHOULDER RANGE OF MOTION: ICD-10-CM

## 2022-07-27 PROCEDURE — 97140 MANUAL THERAPY 1/> REGIONS: CPT | Mod: PN,CQ

## 2022-07-27 PROCEDURE — 97530 THERAPEUTIC ACTIVITIES: CPT | Mod: PN,CQ

## 2022-07-27 PROCEDURE — 97110 THERAPEUTIC EXERCISES: CPT | Mod: PN,CQ

## 2022-07-27 NOTE — PROGRESS NOTES
"[facility]   Physical Therapy Treatment Note     Name: Holly Julian  [facility] Number: [id number]    Therapy Diagnosis:   Encounter Diagnoses   Name Primary?    Acute pain of right shoulder Yes    Decreased right shoulder range of motion     Muscle weakness of upper extremity      Physician: Alanna Marquez FNP    Visit Date: 7/27/2022     Physician Orders: PT Eval and Treat   Medical Diagnosis from Referral:   S43.491D (ICD-10-CM) - Other sprain of right shoulder joint, subsequent encounter  M25.529 (ICD-10-CM) - Arthralgia of upper arm, unspecified laterality  M25.531 (ICD-10-CM) - Pain in wrist, right  Evaluation Date: 7/13/2022  Authorization Period Expiration: 7/6/2023  Plan of Care Expiration: 9/7/2022  Progress Note Due: 8/10/2022  Visit # / Visits authorized: 5/9  PTA Visit #: 2/5     Time In: 1500  Time Out: 1603  Total Billable Time: 61 minutes    SUBJECTIVE     HOME EXERCISE PROGRAM: States compliance with HEP  Response to previous treatment: felt a little sore but is better today.   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     Pain: 4/10  Location: right shoulder (lateral / anterior)    OBJECTIVE     Objective Measures updated at progress report unless specified.     Treatment   Charges based on 1-1 tx:  therapeutic exercises for 45 minutes:    UBE, forward 3'/ retro 3', 1.5 resistance    Wand      Flexion  3x10 1# wand, 5" hold   Chest press 3x10 1# wand   R ER  3x10 1# wand, 5" hold  Pulleys flex/abd: 3min   Rows mary TB, 2x10  Shld ext mary TB 2x10- palms out   ER/IR walk outs, mary TB, 2x10 ea R only   Bicep curls 2# 3x10 R/L        manual therapy techniques for 8 minutes:  R/L GLENOHUMERAL JOINT Gr III/IV JM     Therapeutic activities for 8 minutes:   -Wall washing: Abd semi [place] with furniture slider on wall x20 each UE - emphasis on avoiding UT involvement "       Patient Education and Home Exercises     Home Exercises Provided and Patient Education Provided     Education provided:   - Avoiding impingement with OH activities.     Home Exercises Provided: Patient instructed to cont prior HEP. Exercises were reviewed and Holly was able to demonstrate them prior to the end of the session.  Holly demonstrated good  understanding of the education provided. See EMR under Patient Instructions for exercises provided during therapy sessions    ASSESSMENT   Pt tolerated exercise well this visit with minimal increase in pain beyond stated levels. Muscle weakness in deltoids/scapular muscles are notable during resistive exercises. Some improvement was noted with AAROM with slightly decreased muscle guarding this visit.     Holly is making good progress towards meeting set goals.    Pt prognosis is Good.     Pt will continue to benefit from skilled outpatient physical therapy to address the deficits listed in the problem list box on initial evaluation, provide pt/family education and to maximize pt's level of independence in the home and community environment.     Pt's spiritual, cultural and educational needs considered and pt agreeable to plan of care and goals.     Anticipated barriers to physical therapy: see precautions    Goals:   Short Term Goals: 2 weeks   1. 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  2. Increased R shoulder strength to 4/5 for patient to reach buttons and dials in the shuttle bus at work.(ongoing, not met).     Long Term Goals: 4 weeks   3. Patient to freely turning steering wheel of the shuttle bus without restrictions.(ongoing, not met)  4. 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 Monitor response to session and advance as appropriate.    Omid Álvarez, PTA

## 2022-08-02 ENCOUNTER — CLINICAL SUPPORT (OUTPATIENT)
Dept: REHABILITATION | Facility: OTHER | Age: 52
End: 2022-08-02
Payer: OTHER MISCELLANEOUS

## 2022-08-02 DIAGNOSIS — M25.611 DECREASED RIGHT SHOULDER RANGE OF MOTION: ICD-10-CM

## 2022-08-02 DIAGNOSIS — M62.81 MUSCLE WEAKNESS OF UPPER EXTREMITY: ICD-10-CM

## 2022-08-02 DIAGNOSIS — M25.511 ACUTE PAIN OF RIGHT SHOULDER: Primary | ICD-10-CM

## 2022-08-02 PROCEDURE — 97110 THERAPEUTIC EXERCISES: CPT | Mod: PN,CQ

## 2022-08-02 PROCEDURE — 97140 MANUAL THERAPY 1/> REGIONS: CPT | Mod: PN,CQ

## 2022-08-02 PROCEDURE — 97530 THERAPEUTIC ACTIVITIES: CPT | Mod: PN,CQ

## 2022-08-02 NOTE — PROGRESS NOTES
[facility]   Physical Therapy Treatment Note     Name: Holly Julian  Clinic Number: [id number]    Therapy Diagnosis:   Encounter Diagnoses   Name Primary?    Acute pain of right shoulder Yes    Decreased right shoulder range of motion     Muscle weakness of upper extremity      Physician: Alanna Marquez FNP    Visit Date: 8/3/2022     Physician Orders: PT Eval and Treat   Medical Diagnosis from Referral:   S43.491D (ICD-10-CM) - Other sprain of right shoulder joint, subsequent encounter  M25.529 (ICD-10-CM) - Arthralgia of upper arm, unspecified laterality  M25.531 (ICD-10-CM) - Pain in wrist, right  Evaluation Date: 7/13/2022  Authorization Period Expiration: 7/6/2023  Plan of Care Expiration: 9/7/2022  Progress Note Due: 8/10/2022  Visit # / Visits authorized: 7/9  FOTO: 1/5 8/3/2022  PTA Visit #: 0/5     Time In: 3:13 pm  Time Out: 4:02 pm  Total Billable Time: 45 minutes    SUBJECTIVE   States her arm is doing better, but still hurts.    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     Pain: 3/10  Location: right shoulder (lateral / anterior)    OBJECTIVE     Objective Measures updated at progress report unless specified.     CMS Impairment/Limitation/Restriction for FOTO Shoulder Survey    Therapist reviewed FOTO scores for Holly Julian on 8/3/2022.   FOTO documents entered into [employer] - see Media section.    Limitation Score: 57%  Category: Carrying    Current : CK = at least 40% but < 60% impaired, limited or restricted  Goal: CJ = at least 20% but < 40% impaired, limited or restricted       UE MMT R L   C3 Cervical side bend 5/5 5/5   C4 Shoulder Shrug 5/5  5/5   Shoulder flexion 4/5 5/5   Shoulder abduction 34/5 5/5   Shoulder IR 5/5 5/5   Shoulder ER 5/5 5/5   C5 Elbow flexion 5/5 5/5   C7  "Elbow extension 5/5 5/5   C6 Wrist extension 5/5 5/5   Wrist flexion 5/5 5/5   Scapular protraction 4/5 4+/5   Mid Traps 4/5 4/5   Lower traps 3+/5 3+/5      Joint Mobility:                                                  R                         Shoulder flexion                                 150 deg/170 deg             Shoulder abd                                     135 deg/170 deg             Shoulder ER                                             65 deg                     Shoulder IR                                               60 deg                    Shoulder ER at 90 deg abd                      85 deg                           Treatment     Charges based on 1-1 tx:  therapeutic exercises for 49 minutes:    UBE, forward 2'/ retro 2', 1.5 resistance     +supine alphabet with green weighted ball  +supine scap punches 3 x 8 reps with 3#    Wand      Flexion 3 x 10 1# wand, 5" hold   IR stretch  10 x 10" hold,  1# wand   R ER  3 x 10 1# wand, 5" hold at side and at 90 deg abd    Pulleys flex/abd: 3min   Rows mary TB, 2x10  Shld ext mary TB 2x10- palms out   ER/IR walk outs, mary TB, 2x10 ea R only   Bicep curls 2# 3x10 R/L    +seated R UE scap punches 2 x 10 reps  +seated R UE cross body punches 2 x 10 reps  +seated lateral punches 2 x 10 reps    manual therapy techniques for 00 minutes:  R/L GLENOHUMERAL JOINT Gr III/IV JM     Therapeutic activities for 00 minutes:   -Wall washing: Abd semi [place] with furniture slider on wall x20 each UE - emphasis on avoiding UT involvement       Patient Education and Home Exercises     Home Exercises Provided and Patient Education Provided     Education provided:   - Avoiding impingement with OH activities.     Home Exercises Provided: Patient instructed to cont prior HEP. Exercises were reviewed and Holly was able to demonstrate them prior to the end of the session.  Holly demonstrated good  understanding of the education provided. See EMR under Patient " Instructions for exercises provided during therapy sessions    ASSESSMENT   Patient is progressing well in therapy with increasing R shoulder ROM. Initiated scapular stabilization exercises today duplicating movements she needs to do at work. Easily fatigued today with exercises and required rest breaks between sets.    Holly is making good progress towards meeting set goals.    Pt prognosis is Good.     Pt will continue to benefit from skilled outpatient physical therapy to address the deficits listed in the problem list box on initial evaluation, provide pt/family education and to maximize pt's level of independence in the home and community environment.     Pt's spiritual, cultural and educational needs considered and pt agreeable to plan of care and goals.     Anticipated barriers to physical therapy: see precautions    Goals:   Short Term Goals: 2 weeks   1. 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  2. Increased R shoulder strength to 4/5 for patient to reach buttons and dials in the shuttle bus at work.(ongoing, not met).     Long Term Goals: 4 weeks   3. Patient to freely turning steering wheel of the shuttle bus without restrictions.(ongoing, not met)  4. 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 Monitor response to session and advance as appropriate.    Jose Wagoner, PT

## 2022-08-02 NOTE — PROGRESS NOTES
[facility]   Physical Therapy Treatment Note     Name: Holly Julian  [facility] Number: [id number]    Therapy Diagnosis:   Encounter Diagnoses   Name Primary?    Acute pain of right shoulder Yes    Decreased right shoulder range of motion     Muscle weakness of upper extremity      Physician: Alanna Marquez FNP    Visit Date: 8/2/2022     Physician Orders: PT Eval and Treat   Medical Diagnosis from Referral:   S43.491D (ICD-10-CM) - Other sprain of right shoulder joint, subsequent encounter  M25.529 (ICD-10-CM) - Arthralgia of upper arm, unspecified laterality  M25.531 (ICD-10-CM) - Pain in wrist, right  Evaluation Date: 7/13/2022  Authorization Period Expiration: 7/6/2023  Plan of Care Expiration: 9/7/2022  Progress Note Due: 8/10/2022  Visit # / Visits authorized: 6/9  PTA Visit #: 2/5     Time In: 1500  Time Out: 1608  Total Billable Time: 60 minutes    SUBJECTIVE   Pt reports she is feels like PT is helping, pain is less today.    HOME EXERCISE PROGRAM: States compliance with HEP  Response to previous treatment: felt well. No issues.  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     Pain: 3/10  Location: right shoulder (lateral / anterior)    OBJECTIVE     Objective Measures updated at progress report unless specified.     Treatment   Charges based on 1-1 tx:  therapeutic exercises for 44 minutes:    UBE, forward 3'/ retro 3', 1.5 resistance    Wand      Flexion  3x10 2# wand   Chest press 3x10 2# wand   R ER  3x10 2# wand  Pulleys flex/abd: 3min   Rows mary TB, 2x10  Shld ext mary TB 2x10- palms out   ER/IR mary TB, 2x10 ea R only   Bicep curls 2# 3x10 R/L  +Wall push ups 2x10          manual therapy techniques for 8 minutes:  R/L GLENOHUMERAL JOINT Gr III/IV JM     Therapeutic activities for 8 minutes:   -Wall washing: Abd semi [place] with furniture slider on wall x20 each UE -  emphasis on avoiding UT involvement       Patient Education and Home Exercises     Home Exercises Provided and Patient Education Provided     Education provided:    - Avoiding impingement with OH activities.     Home Exercises Provided: Patient instructed to cont prior HEP. Exercises were reviewed and Holly was able to demonstrate them prior to the end of the session.  Holly demonstrated good  understanding of the education provided. See EMR under Patient Instructions for exercises provided during therapy sessions    ASSESSMENT   Pt tolerated exercise well this visit. Progressed shoulder girdle strengthening with resisted exercises/AROM with good training effect noted. Strength in deltoids and scapular muscles appear to be improving however weakness remains notable at this time.      Holly is making good progress towards meeting set goals.    Pt prognosis is Good.     Pt will continue to benefit from skilled outpatient physical therapy to address the deficits listed in the problem list box on initial evaluation, provide pt/family education and to maximize pt's level of independence in the home and community environment.     Pt's spiritual, cultural and educational needs considered and pt agreeable to plan of care and goals.     Anticipated barriers to physical therapy: see precautions    Goals:   Short Term Goals: 2 weeks   1. 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  2. Increased R shoulder strength to 4/5 for patient to reach buttons and dials in the shuttle bus at work.(ongoing, not met).     Long Term Goals: 4 weeks   3. Patient to freely turning steering wheel of the shuttle bus without restrictions.(ongoing, not met)  4. 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 Monitor response to session and advance as appropriate.        Omid Álvarez, PTA

## 2022-08-03 ENCOUNTER — CLINICAL SUPPORT (OUTPATIENT)
Dept: REHABILITATION | Facility: OTHER | Age: 52
End: 2022-08-03
Payer: OTHER MISCELLANEOUS

## 2022-08-03 DIAGNOSIS — M25.511 ACUTE PAIN OF RIGHT SHOULDER: Primary | ICD-10-CM

## 2022-08-03 DIAGNOSIS — M62.81 MUSCLE WEAKNESS OF UPPER EXTREMITY: ICD-10-CM

## 2022-08-03 DIAGNOSIS — M25.611 DECREASED RIGHT SHOULDER RANGE OF MOTION: ICD-10-CM

## 2022-08-03 PROCEDURE — 97110 THERAPEUTIC EXERCISES: CPT | Mod: PN

## 2022-08-05 ENCOUNTER — CLINICAL SUPPORT (OUTPATIENT)
Dept: REHABILITATION | Facility: OTHER | Age: 52
End: 2022-08-05
Payer: OTHER MISCELLANEOUS

## 2022-08-05 DIAGNOSIS — M25.611 DECREASED RIGHT SHOULDER RANGE OF MOTION: ICD-10-CM

## 2022-08-05 DIAGNOSIS — M25.511 ACUTE PAIN OF RIGHT SHOULDER: Primary | ICD-10-CM

## 2022-08-05 DIAGNOSIS — M62.81 MUSCLE WEAKNESS OF UPPER EXTREMITY: ICD-10-CM

## 2022-08-05 PROCEDURE — 97140 MANUAL THERAPY 1/> REGIONS: CPT | Mod: PN,CQ

## 2022-08-05 PROCEDURE — 97110 THERAPEUTIC EXERCISES: CPT | Mod: PN,CQ

## 2022-08-05 PROCEDURE — 97530 THERAPEUTIC ACTIVITIES: CPT | Mod: PN,CQ

## 2022-08-05 NOTE — PROGRESS NOTES
[facility]   Physical Therapy Treatment Note     Name: Holly Julian  Clinic Number: [id number]    Therapy Diagnosis:   Encounter Diagnoses   Name Primary?    Acute pain of right shoulder Yes    Decreased right shoulder range of motion     Muscle weakness of upper extremity      Physician: Alanna Marquez FNP    Visit Date: 8/5/2022     Physician Orders: PT Eval and Treat   Medical Diagnosis from Referral:   S43.491D (ICD-10-CM) - Other sprain of right shoulder joint, subsequent encounter  M25.529 (ICD-10-CM) - Arthralgia of upper arm, unspecified laterality  M25.531 (ICD-10-CM) - Pain in wrist, right  Evaluation Date: 7/13/2022  Authorization Period Expiration: 7/6/2023  Plan of Care Expiration: 9/7/2022  Progress Note Due: 8/10/2022  Visit # / Visits authorized: 8/9  FOTO: 1/5 8/3/2022  PTA Visit #: 0/5     Time In: 1458  Time Out: 1600  Total Billable Time: 60 minutes    SUBJECTIVE   States she is a little sore from last session but overall she is getting better and is almost ready to return to work.     HOME EXERCISE PROGRAM: States compliance with HEP  Response to previous treatment: felt a little sore but overall better.   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     Pain: 3/10  Location: right shoulder (lateral / anterior)    OBJECTIVE     Objective Measures updated at progress report unless specified.     CMS Impairment/Limitation/Restriction for FOTO Shoulder Survey    Therapist reviewed FOTO scores for Holly Julian on 8/5/2022.   FOTO documents entered into [employer] - see Media section.    Limitation Score: 57%  Category: Carrying    Current : CK = at least 40% but < 60% impaired, limited or restricted  Goal: CJ = at least 20% but < 40% impaired, limited or restricted       UE MMT R L   C3 Cervical side bend 5/5 5/5   C4 Shoulder Shrug 5/5  5/5   Shoulder flexion 4/5 5/5    Shoulder abduction 34/5 5/5   Shoulder IR 5/5 5/5   Shoulder ER 5/5 5/5   C5 Elbow flexion 5/5 5/5   C7 Elbow extension 5/5 5/5   C6 Wrist extension 5/5 5/5   Wrist flexion 5/5 5/5   Scapular protraction 4/5 4+/5   Mid Traps 4/5 4/5   Lower traps 3+/5 3+/5      Joint Mobility:                                                  R                         Shoulder flexion                                 150 deg/170 deg             Shoulder abd                                     135 deg/170 deg             Shoulder ER                                             65 deg                     Shoulder IR                                               60 deg                    Shoulder ER at 90 deg abd                      85 deg                           Treatment     Charges based on 1-1 tx:  therapeutic exercises for 27 minutes:    UBE, forward 2' fwd/retro 2', 1.5 resistance   Standing wand flex 2#,  3 x 10   +SL ER with 1# 3x10 R only  Pulleys flex/abd: 3min   Rows mary TB, 3x10  Shld ext mary TB 3x10- palms out   ER/IR  mayr TB, 3x10 ea R only   Bicep curls 2# 3x10 R/L    +seated R UE scap punches 2 x 10 reps  +seated R UE cross body punches 2 x 10 reps  +seated lateral punches 2 x 10 reps    manual therapy techniques for 8 minutes:  R/L GLENOHUMERAL JOINT Gr III/IV JM     Therapeutic activities for 25 minutes:   Supine alphabet with green weighted ball  +Wall ball circles with green ball, 30x cw/ccw  Supine scap punches 3 x 8 reps with 3#  +Wall pushups 3x10        -Wall washing: Abd semi [place] with furniture slider on wall x20 each UE - emphasis on avoiding UT involvement       Patient Education and Home Exercises     Home Exercises Provided and Patient Education Provided     Education provided:   - Avoiding impingement with OH activities.     Home Exercises Provided: Patient instructed to cont prior HEP. Exercises were reviewed and Holly was able to demonstrate them prior to the end of the session.  Holly  demonstrated good  understanding of the education provided. See EMR under Patient Instructions for exercises provided during therapy sessions    ASSESSMENT   Pt tolerated well. Continued with shoulder girdle strengthening and AROM with emphasis on stabilization.          Holly is making good progress towards meeting set goals.    Pt prognosis is Good.     Pt will continue to benefit from skilled outpatient physical therapy to address the deficits listed in the problem list box on initial evaluation, provide pt/family education and to maximize pt's level of independence in the home and community environment.     Pt's spiritual, cultural and educational needs considered and pt agreeable to plan of care and goals.     Anticipated barriers to physical therapy: see precautions    Goals:   Short Term Goals: 2 weeks   1. 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  2. Increased R shoulder strength to 4/5 for patient to reach buttons and dials in the shuttle bus at work.(ongoing, not met).     Long Term Goals: 4 weeks   3. Patient to freely turning steering wheel of the shuttle bus without restrictions.(ongoing, not met)  4. 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 Monitor response to session and advance as appropriate.    Omid Álvarez, PTA

## 2022-08-09 ENCOUNTER — CLINICAL SUPPORT (OUTPATIENT)
Dept: REHABILITATION | Facility: OTHER | Age: 52
End: 2022-08-09
Payer: OTHER MISCELLANEOUS

## 2022-08-09 DIAGNOSIS — M62.81 MUSCLE WEAKNESS OF UPPER EXTREMITY: ICD-10-CM

## 2022-08-09 DIAGNOSIS — M25.511 ACUTE PAIN OF RIGHT SHOULDER: Primary | ICD-10-CM

## 2022-08-09 DIAGNOSIS — M25.611 DECREASED RIGHT SHOULDER RANGE OF MOTION: ICD-10-CM

## 2022-08-09 PROCEDURE — 97110 THERAPEUTIC EXERCISES: CPT | Mod: PN | Performed by: PHYSICAL THERAPIST

## 2022-08-09 PROCEDURE — 97530 THERAPEUTIC ACTIVITIES: CPT | Mod: PN | Performed by: PHYSICAL THERAPIST

## 2022-08-09 NOTE — PROGRESS NOTES
[facility]   Physical Therapy Treatment Note     Name: Holly Julian  [facility] Number: [id number]    Therapy Diagnosis:   Encounter Diagnoses   Name Primary?    Acute pain of right shoulder Yes    Decreased right shoulder range of motion     Muscle weakness of upper extremity      Physician: Alanna Marquez FNP    Visit Date: 8/9/2022     Physician Orders: PT Eval and Treat   Medical Diagnosis from Referral:   S43.491D (ICD-10-CM) - Other sprain of right shoulder joint, subsequent encounter  M25.529 (ICD-10-CM) - Arthralgia of upper arm, unspecified laterality  M25.531 (ICD-10-CM) - Pain in wrist, right  Evaluation Date: 7/13/2022  Authorization Period Expiration: 7/6/2023  Plan of Care Expiration: 9/7/2022  Progress Note Due: 8/10/2022  Visit # / Visits authorized: 8/9  FOTO: 1/5 8/3/2022  PTA Visit #: 0/5     Time In: 2:55 pm  Time Out: 3:50 pm  Total Billable Time: 55 minutes    SUBJECTIVE   States she is a lot better than when she first started PT. 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 Rates pain as a 2/10 without pain medication today. Has f/u with urgent care tomorrow.     HOME EXERCISE PROGRAM: States compliance with HEP  Response to previous treatment: felt a little sore but overall better.   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     Pain: 2/10  Location: right shoulder (lateral / anterior)    OBJECTIVE     Job Specific demands:    Reaching at diagonal to press button to open shuttle door: able to demonstrate Range of Motion required, some fatigue with repeated motions testing >20 reps  Turning the Wheel: full Range of Motion; fatigue with repeated pronation/ supination > 1 minute and ball on wall > 20 reps    CMS  Impairment/Limitation/Restriction for FOTO Shoulder Survey    Therapist reviewed FOTO scores for Holly Julian on 8/9/2022.   FOTO documents entered into [employer] - see Media section.    Limitation Score: 57%  Category: Carrying    Current : CK = at least 40% but < 60% impaired, limited or restricted  Goal: CJ = at least 20% but < 40% impaired, limited or restricted     8/3/2022    UE MMT R L   C3 Cervical side bend 5/5 5/5   C4 Shoulder Shrug 5/5  5/5   Shoulder flexion 4/5 5/5   Shoulder abduction 4/5 5/5   Shoulder IR 5/5 5/5   Shoulder ER 5/5 5/5   C5 Elbow flexion 5/5 5/5   C7 Elbow extension 5/5 5/5   C6 Wrist extension 5/5 5/5   Wrist flexion 5/5 5/5   Scapular protraction 4/5 4+/5   Mid Traps 4/5 4/5   Lower traps 3+/5 3+/5      Joint Mobility:                                                  R                         Shoulder flexion                                 150 deg/170 deg             Shoulder abd                                     135 deg/170 deg             Shoulder ER                                             65 deg                     Shoulder IR                                               60 deg                    Shoulder ER at 90 deg abd                      85 deg                           Treatment     Charges based on 1-1 tx:  therapeutic exercises for 30 minutes:    UBE, forward 2' fwd/retro 2', 1.5 resistance   Standing wand flex 2#,  3 x 10   SL ER with 1# 3x10 R only  Pulleys flex/abd: 3min   Rows mary TB, 3x10  Shld ext mary TB 3x10- palms out   ER/IR  mary TB, 3x10 ea R only   Bicep curls 2# 3x10 R/L    seated R UE scap punches 2 x 10 reps, YTB  seated R UE cross body punches 2 x 10 reps  seated lateral punches 2 x 10 reps (with PB)    manual therapy techniques for 00 minutes:  R/L GLENOHUMERAL JOINT Gr III/IV JM     Therapeutic activities for 25 minutes:   Supine alphabet with green weighted ball  Wall ball circles with red ball, 20x cw/ccw  Supine scap punches 3 x 8 reps with  3#  Wall pushups 3x10  Ball circles with gray PB seated x 30 ea CW/CCW  Supination / pronation with  stick 1 min x 3      Patient Education and Home Exercises     Home Exercises Provided and Patient Education Provided     Education provided:   - return to work activities    Home Exercises Provided: Patient instructed to cont prior HEP. Exercises were reviewed and Holly was able to demonstrate them prior to the end of the session.  Holly demonstrated good  understanding of the education provided. See EMR under Patient Instructions for exercises provided during therapy sessions    ASSESSMENT   Patient tolerated treatment well with minimal anterior R shoulder pain. Pt with decreased muscular endurance with repeated reaching in scapular plane and arm circles required for vocational tasks.  Pt has completed 9/9 approved visits and will continue per [facility] recommendations.     Holly is making good progress towards meeting set goals.    Pt prognosis is Good.     Pt will continue to benefit from skilled outpatient physical therapy to address the deficits listed in the problem list box on initial evaluation, provide pt/family education and to maximize pt's level of independence in the home and community environment.     Pt's spiritual, cultural and educational needs considered and pt agreeable to plan of care and goals.     Anticipated barriers to physical therapy: see precautions    Goals:   Short Term Goals: 2 weeks   1. 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  2. Increased R shoulder strength to 4/5 for patient to reach buttons and dials in the shuttle bus at work.(ongoing, not met).     Long Term Goals: 4 weeks   3. Patient to freely turning steering wheel of the shuttle bus without restrictions.(ongoing, not met)  4. 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    Felicia Ospina, PT

## 2022-08-10 ENCOUNTER — OFFICE VISIT (OUTPATIENT)
Dept: URGENT CARE | Facility: CLINIC | Age: 52
End: 2022-08-10
Payer: OTHER MISCELLANEOUS

## 2022-08-10 DIAGNOSIS — S43.491D OTHER SPRAIN OF RIGHT SHOULDER JOINT, SUBSEQUENT ENCOUNTER: ICD-10-CM

## 2022-08-10 DIAGNOSIS — M25.529 ARTHRALGIA OF UPPER ARM, UNSPECIFIED LATERALITY: ICD-10-CM

## 2022-08-10 DIAGNOSIS — S43.491A OTHER SPRAIN OF RIGHT SHOULDER JOINT, INITIAL ENCOUNTER: ICD-10-CM

## 2022-08-10 DIAGNOSIS — Z02.6 ENCOUNTER RELATED TO WORKER'S COMPENSATION CLAIM: Primary | ICD-10-CM

## 2022-08-10 DIAGNOSIS — M25.531 PAIN IN WRIST, RIGHT: ICD-10-CM

## 2022-08-10 PROCEDURE — 99214 PR OFFICE/OUTPT VISIT, EST, LEVL IV, 30-39 MIN: ICD-10-PCS | Mod: S$GLB,,, | Performed by: NURSE PRACTITIONER

## 2022-08-10 PROCEDURE — 99214 OFFICE O/P EST MOD 30 MIN: CPT | Mod: S$GLB,,, | Performed by: NURSE PRACTITIONER

## 2022-08-10 NOTE — PROGRESS NOTES
Subjective:       Patient ID: Holly Julian is a 52 y.o. female.    Chief Complaint: No chief complaint on file.    RV, Right Shoulder,06/06/22, Joseph- Pain level is 4/10 on today. She is still doing PT which is helping she only has three more session and would like to request more. She doing Tylenol and muscle relaxer. 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 Every now and then she get a short aching pain in her right wrist. BG        Shoulder Injury   The incident occurred at work. The right shoulder is affected. The incident occurred more than 1 week ago. The quality of the pain is described as aching. The pain does not radiate. The pain is at a severity of 4/10. The pain is mild. Pertinent negatives include no chest pain, muscle weakness, numbness or tingling.   Arm Injury   The incident occurred more than 1 week ago. The incident occurred at work. The injury mechanism was twisted. The pain is present in the right shoulder and right wrist. Quality: throbbing. The pain radiates to the right hand. The pain is at a severity of 3/10. The pain is mild. The pain has been intermittent since the incident. Pertinent negatives include no chest pain, muscle weakness, numbness or tingling. The symptoms are aggravated by movement. She has tried ice and heat for the symptoms. The treatment provided mild relief.       Cardiovascular: Negative for chest pain.   Musculoskeletal: Positive for pain.   Neurological: Negative for numbness.        Objective:      Physical Exam  Vitals and nursing note reviewed.   Constitutional:       General: She is not in acute distress.     Appearance: She is well-developed. She is not diaphoretic.   HENT:      Head: Normocephalic and atraumatic.      Right Ear: Hearing and external ear normal.      Left Ear: Hearing and external ear normal.      Nose: Nose normal. No nasal deformity.   Eyes:      General: Lids are normal. No scleral icterus.     Conjunctiva/sclera: Conjunctivae normal.   Neck:       Trachea: Trachea normal.   Cardiovascular:      Pulses: Normal pulses.   Pulmonary:      Effort: Pulmonary effort is normal. No respiratory distress.      Breath sounds: No stridor.   Musculoskeletal:      Right shoulder: Tenderness present. Normal range of motion.      Cervical back: Normal range of motion.   Skin:     General: Skin is warm and dry.      Capillary Refill: Capillary refill takes less than 2 seconds.   Neurological:      Mental Status: She is alert. She is not disoriented.      GCS: GCS eye subscore is 4. GCS verbal subscore is 5. GCS motor subscore is 6.      Sensory: No sensory deficit.   Psychiatric:         Attention and Perception: She is attentive.         Speech: Speech normal.         Behavior: Behavior normal.         Assessment:       1. Encounter related to worker's compensation claim    2. Other sprain of right shoulder joint, subsequent encounter    3. Arthralgia of upper arm, unspecified laterality    4. Pain in wrist, right    5. Other sprain of right shoulder joint, initial encounter        Plan:       Patient is doing very well with pt. She feels like she is progressing. Will order more pt as she has reached 9/9 visits.  She is still doing the small van/buses. We discussed plan of doing more pt visits. 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 I am hopefull that she is MMI in the next several weeks.      Patient Instructions: Attention not to aggravate affected area, Continue Physical Therapy   Restrictions: Regular Duty  Follow up in about 2 weeks (around 8/24/2022).

## 2022-08-10 NOTE — LETTER
Urgent Care - [address] [address]  Phone: [phone]  Fax: [phone]  [facility]: [phone]    Pt Name: Holly Julian  Injury Date: 06/06/2022   Employee ID:  Date of  Treatment: 08/10/2022   Company: [employer]      Appointment Time: 10:30 AM Arrived: 10:30 AM   Provider: ELLIS Yepez Time Out:11:40 AM     Office Treatment:   1. Encounter related to worker's compensation claim    2. Other sprain of right shoulder joint, subsequent encounter    3. Arthralgia of upper arm, unspecified laterality    4. Pain in wrist, right    5. Other sprain of right shoulder joint, initial encounter          Patient Instructions: Attention not to aggravate affected area, Continue Physical Therapy    Restrictions: Regular Duty     Return Appointment: 8/24/2022 at 09:30 AM    BG

## 2022-08-11 ENCOUNTER — DOCUMENTATION ONLY (OUTPATIENT)
Dept: REHABILITATION | Facility: OTHER | Age: 52
End: 2022-08-11
Payer: OTHER MISCELLANEOUS

## 2022-08-11 NOTE — PROGRESS NOTES
Called pt to inform her 9 more visits approved. She rescheduled her appt tomorrow 8/12 to Mon 8/15 due to personal reasons.

## 2022-08-11 NOTE — PROGRESS NOTES
Outpatient Therapy Compliance Update     Name: Holly Julian  [facility] Number: [id number]    Therapy Diagnosis:   Acute pain of right shoulder  Decreased right shoulder range of motion  Muscle weakness of upper extremity    Physician: Alanna Marquez FNP    Care Update      Today Holly Julian no-showed to her Physical Therapy appointment.     Date of next scheduled appointment: 8/12/2022    Jose Wagoner, PT

## 2022-08-15 ENCOUNTER — CLINICAL SUPPORT (OUTPATIENT)
Dept: REHABILITATION | Facility: OTHER | Age: 52
End: 2022-08-15
Payer: OTHER MISCELLANEOUS

## 2022-08-15 DIAGNOSIS — M62.81 MUSCLE WEAKNESS OF UPPER EXTREMITY: ICD-10-CM

## 2022-08-15 DIAGNOSIS — M25.511 ACUTE PAIN OF RIGHT SHOULDER: Primary | ICD-10-CM

## 2022-08-15 DIAGNOSIS — M25.611 DECREASED RIGHT SHOULDER RANGE OF MOTION: ICD-10-CM

## 2022-08-15 PROCEDURE — 97110 THERAPEUTIC EXERCISES: CPT | Mod: PN | Performed by: PHYSICAL THERAPIST

## 2022-08-15 PROCEDURE — 97530 THERAPEUTIC ACTIVITIES: CPT | Mod: PN | Performed by: PHYSICAL THERAPIST

## 2022-08-15 NOTE — PROGRESS NOTES
[facility]   Physical Therapy Treatment Note     Name: Holly Julian  Clinic Number: [id number]    Therapy Diagnosis:   Encounter Diagnoses   Name Primary?    Acute pain of right shoulder Yes    Decreased right shoulder range of motion     Muscle weakness of upper extremity      Physician: Alanna Marquez FNP    Visit Date: 8/15/2022     Physician Orders: PT Eval and Treat   Medical Diagnosis from Referral:   S43.491D (ICD-10-CM) - Other sprain of right shoulder joint, subsequent encounter  M25.529 (ICD-10-CM) - Arthralgia of upper arm, unspecified laterality  M25.531 (ICD-10-CM) - Pain in wrist, right  Evaluation Date: 7/13/2022  Authorization Period Expiration: 7/6/2023  Plan of Care Expiration: 9/7/2022  Progress Note Due: 8/10/2022  Visit # / Visits authorized: 1/9, +9/9  FOTO: 1/5 8/3/2022  PTA Visit #: 0/5     Time In: 1455  Time Out: 1540  Total Billable Time: 45 minutes    SUBJECTIVE   States doing well today, no new complaints. Still driving smaller vans at work.     HOME EXERCISE PROGRAM: States compliance with HEP  Response to previous treatment: felt a little sore but overall better.   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     Pain: 3/10  Location: right shoulder (lateral / anterior)    OBJECTIVE     Job Specific demands:    Reaching at diagonal to press button to open shuttle door: able to demonstrate Range of Motion required, some fatigue with repeated motions testing >20 reps  Turning the Wheel: full Range of Motion; fatigue with repeated pronation/ supination > 1 minute and ball on wall > 20 reps    CMS Impairment/Limitation/Restriction for FOTO Shoulder Survey    Therapist reviewed FOTO scores for Holly Julian on 8/3/2022  FOTO documents entered into [employer] - see Media section.    Limitation Score: 57%  Category: Carrying    Current : CK = at least 40% but < 60%  impaired, limited or restricted  Goal: CJ = at least 20% but < 40% impaired, limited or restricted     8/3/2022    UE MMT R L   C3 Cervical side bend 5/5 5/5   C4 Shoulder Shrug 5/5  5/5   Shoulder flexion 4/5 5/5   Shoulder abduction 4/5 5/5   Shoulder IR 5/5 5/5   Shoulder ER 5/5 5/5   C5 Elbow flexion 5/5 5/5   C7 Elbow extension 5/5 5/5   C6 Wrist extension 5/5 5/5   Wrist flexion 5/5 5/5   Scapular protraction 4/5 4+/5   Mid Traps 4/5 4/5   Lower traps 3+/5 3+/5      Joint Mobility:                                                  R                         Shoulder flexion                                 150 deg/170 deg             Shoulder abd                                     135 deg/170 deg             Shoulder ER                                             65 deg                     Shoulder IR                                               60 deg                    Shoulder ER at 90 deg abd                      85 deg                           Treatment     Charges based on 1-1 tx:  therapeutic exercises for 30 minutes:    UBE, forward 2' fwd/retro 2', 1.5 resistance   Standing wand flex 2#,  3 x 10   SL ER with 2# 3x10 R only  Pulleys flex/abd: 2min   Rows mary TB, 3x10  Shld ext mary TB 3x10- palms out   ER/IR  mary TB, 3x10 ea R only   Bicep curls 2# 3x10 R/L    seated R UE scap punches 2 x 10 reps, OTB  seated R UE cross body punches 2 x 10 reps  seated lateral punches 2 x 10 reps (with PB)    manual therapy techniques for 00 minutes:  R/L GLENOHUMERAL JOINT Gr III/IV JM     Therapeutic activities for 15 minutes:   Supine alphabet with green weighted ball  Wall ball circles with red ball, 20x cw/ccw  Supine scap punches 3 x 8 reps with 3#  Wall pushups 3x10 - EOM today  Ball circles with gray PB seated x 30 ea CW/CCW  Supination / pronation with  stick 1 min x 3  +Zombie carry 1# x 2 laps      Patient Education and Home Exercises     Home Exercises Provided and Patient Education Provided      Education provided:   - return to work activities    Home Exercises Provided: Patient instructed to cont prior HEP. Exercises were reviewed and Holly was able to demonstrate them prior to the end of the session.  Holly demonstrated good  understanding of the education provided. See EMR under Patient Instructions for exercises provided during therapy sessions    ASSESSMENT   Overall good tolerance to treatment today. Mild c/o fatigue with increased weight with SL ER and increased resistance with serratus punch. Tactile cuing to decrease UT recruitment with scapular retraction.      Holly is making good progress towards meeting set goals.    Pt prognosis is Good.     Pt will continue to benefit from skilled outpatient physical therapy to address the deficits listed in the problem list box on initial evaluation, provide pt/family education and to maximize pt's level of independence in the home and community environment.     Pt's spiritual, cultural and educational needs considered and pt agreeable to plan of care and goals.     Anticipated barriers to physical therapy: see precautions    Goals:   Short Term Goals: 2 weeks   1. 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  2. Increased R shoulder strength to 4/5 for patient to reach buttons and dials in the shuttle bus at work.(ongoing, not met).     Long Term Goals: 4 weeks   3. Patient to freely turning steering wheel of the shuttle bus without restrictions.(ongoing, not met)  4. 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    Martina Todd, PT

## 2022-08-18 ENCOUNTER — DOCUMENTATION ONLY (OUTPATIENT)
Dept: REHABILITATION | Facility: OTHER | Age: 52
End: 2022-08-18
Payer: OTHER MISCELLANEOUS

## 2022-08-18 NOTE — PROGRESS NOTES
Patient was scheduled for a follow up physical therapy appointment at [facility] location on 8/18/2022. Patient failed to appear for the appointment without prior notification today.         Omid Álvarez, PTA  8/18/2022

## 2022-08-23 ENCOUNTER — CLINICAL SUPPORT (OUTPATIENT)
Dept: REHABILITATION | Facility: OTHER | Age: 52
End: 2022-08-23
Payer: OTHER MISCELLANEOUS

## 2022-08-23 DIAGNOSIS — M62.81 MUSCLE WEAKNESS OF UPPER EXTREMITY: ICD-10-CM

## 2022-08-23 DIAGNOSIS — M25.611 DECREASED RIGHT SHOULDER RANGE OF MOTION: ICD-10-CM

## 2022-08-23 DIAGNOSIS — M25.511 ACUTE PAIN OF RIGHT SHOULDER: Primary | ICD-10-CM

## 2022-08-23 PROCEDURE — 97110 THERAPEUTIC EXERCISES: CPT | Mod: PN,CQ

## 2022-08-23 PROCEDURE — 97530 THERAPEUTIC ACTIVITIES: CPT | Mod: PN,CQ

## 2022-08-23 NOTE — PROGRESS NOTES
[facility]   Physical Therapy Treatment Note     Name: Holly Julian  Clinic Number: [id number]    Therapy Diagnosis:   Encounter Diagnoses   Name Primary?    Acute pain of right shoulder Yes    Decreased right shoulder range of motion     Muscle weakness of upper extremity      Physician: Alanna Marquez FNP    Visit Date: 8/23/2022     Physician Orders: PT Eval and Treat   Medical Diagnosis from Referral:   S43.491D (ICD-10-CM) - Other sprain of right shoulder joint, subsequent encounter  M25.529 (ICD-10-CM) - Arthralgia of upper arm, unspecified laterality  M25.531 (ICD-10-CM) - Pain in wrist, right  Evaluation Date: 7/13/2022  Authorization Period Expiration: 7/6/2023  Plan of Care Expiration: 9/7/2022  Progress Note Due: 8/10/2022  Visit # / Visits authorized: 2/9, +9/9  FOTO: 1/5 8/3/2022  PTA Visit #: 1/5     Time In: 3:10pm  Time Out: 3:55pm  Total Billable Time: 45 minutes    SUBJECTIVE   Patient reports she is feeling well today, the pain in the R shoulder has decreased. Still driving at work.  HOME EXERCISE PROGRAM: States compliance with HEP  Response to previous treatment: Slight soreness   Function: n/a    Pain: 3/10  Location: right shoulder (lateral / anterior)    OBJECTIVE     Job Specific demands:    Reaching at diagonal to press button to open shuttle door: able to demonstrate Range of Motion required, some fatigue with repeated motions testing >20 reps  Turning the Wheel: full Range of Motion; fatigue with repeated pronation/ supination > 1 minute and ball on wall > 20 reps    CMS Impairment/Limitation/Restriction for FOTO Shoulder Survey    Therapist reviewed FOTO scores for Holly Julian on 8/3/2022  FOTO documents entered into [employer] - see Media section.    Limitation Score: 57%  Category: Carrying    Current : CK = at least 40% but < 60% impaired, limited or restricted  Goal: CJ = at least 20% but < 40% impaired, limited or restricted     8/3/2022    UE MMT R  L   C3 Cervical side bend 5/5 5/5   C4 Shoulder Shrug 5/5  5/5   Shoulder flexion 4/5 5/5   Shoulder abduction 4/5 5/5   Shoulder IR 5/5 5/5   Shoulder ER 5/5 5/5   C5 Elbow flexion 5/5 5/5   C7 Elbow extension 5/5 5/5   C6 Wrist extension 5/5 5/5   Wrist flexion 5/5 5/5   Scapular protraction 4/5 4+/5   Mid Traps 4/5 4/5   Lower traps 3+/5 3+/5      Joint Mobility:                                                  R                         Shoulder flexion                                 150 deg/170 deg             Shoulder abd                                     135 deg/170 deg             Shoulder ER                                             65 deg                     Shoulder IR                                               60 deg                    Shoulder ER at 90 deg abd                      85 deg                           Treatment     Charges based on 1-1 tx:  therapeutic exercises for 30 minutes:    UBE, forward 2' fwd/retro 2', 1.5 resistance   Standing wand flex 2#,  3 x 10   SL ER with 2# 3x10 R only  Pulleys flex/abd: 2min    Rows mary TB, 3x10  Shld ext mary TB 3x10- palms out    ER/IR  mary TB, 3x10 ea R only    Bicep curls 2# 3x10 R/L    seated R UE scap punches 2 x 10 reps, OTB  seated R UE cross body punches 2 x 10 reps  seated lateral punches 2 x 10 reps (with PB)    manual therapy techniques for 00 minutes:  R/L GLENOHUMERAL JOINT Gr III/IV JM     Therapeutic activities for 15 minutes:   Supine alphabet with green weighted ball  Wall ball circles with red ball, 20x cw/ccw  Supine scap punches 3 x 8 reps with 3#  Wall pushups 3x10 - EOM today  Ball circles with gray PB seated x 30 ea CW/CCW  Supination / pronation with  stick 1 min x 3  Zombie carry 1# x 2 laps      Patient Education and Home Exercises     Home Exercises Provided and Patient Education Provided     Education provided:   - return to work activities    Home Exercises Provided: Patient instructed to cont prior HEP.  Exercises were reviewed and Holly was able to demonstrate them prior to the end of the session.  Holly demonstrated good  understanding of the education provided. See EMR under Patient Instructions for exercises provided during therapy sessions    ASSESSMENT   Patient demonstrated good tolerance to exercises today. Mild fatigue noted with with resistance band exercises. Provided verbal cuing to recruit scapular retraction during exercises. No c/o increased pain in R shoulder with exercises today.      Holly is making good progress towards meeting set goals.    Pt prognosis is Good.      Pt will continue to benefit from skilled outpatient physical therapy to address the deficits listed in the problem list box on initial evaluation, provide pt/family education and to maximize pt's level of independence in the home and community environment.     Pt's spiritual, cultural and educational needs considered and pt agreeable to plan of care and goals.     Anticipated barriers to physical therapy: see precautions    Goals:   Short Term Goals: 2 weeks   1. 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  2. Increased R shoulder strength to 4/5 for patient to reach buttons and dials in the shuttle bus at work.(ongoing, not met).     Long Term Goals: 4 weeks   3. Patient to freely turning steering wheel of the shuttle bus without restrictions.(ongoing, not met)  4. 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    Nabeel Joy, PTA

## 2022-08-24 ENCOUNTER — OFFICE VISIT (OUTPATIENT)
Dept: URGENT CARE | Facility: CLINIC | Age: 52
End: 2022-08-24
Payer: OTHER MISCELLANEOUS

## 2022-08-24 DIAGNOSIS — Z02.6 ENCOUNTER RELATED TO WORKER'S COMPENSATION CLAIM: ICD-10-CM

## 2022-08-24 DIAGNOSIS — M25.529 ARTHRALGIA OF UPPER ARM, UNSPECIFIED LATERALITY: ICD-10-CM

## 2022-08-24 DIAGNOSIS — S43.491D OTHER SPRAIN OF RIGHT SHOULDER JOINT, SUBSEQUENT ENCOUNTER: Primary | ICD-10-CM

## 2022-08-24 PROCEDURE — 99214 OFFICE O/P EST MOD 30 MIN: CPT | Mod: S$GLB,,, | Performed by: NURSE PRACTITIONER

## 2022-08-24 PROCEDURE — 99214 PR OFFICE/OUTPT VISIT, EST, LEVL IV, 30-39 MIN: ICD-10-PCS | Mod: S$GLB,,, | Performed by: NURSE PRACTITIONER

## 2022-08-24 RX ORDER — METHOCARBAMOL 500 MG/1
1000 TABLET, FILM COATED ORAL 3 TIMES DAILY PRN
Qty: 30 TABLET | Refills: 0 | Status: SHIPPED | OUTPATIENT
Start: 2022-08-24 | End: 2022-08-31

## 2022-08-24 NOTE — PROGRESS NOTES
Subjective:       Patient ID: Holly Julian is a 52 y.o. female.    Vitals:  vitals were not taken for this visit.     Chief Complaint: No chief complaint on file.    RV, Right Shoulder,06/06/22, Joseph- Pain level is 3/10 today. Patient reports that she had therapy yesterday and thinks that it is helping.     Follow up right shoulder injury she states therapy is helping. She is a little sore today because did heavy exercises at therapy. She has 7 more visits      Constitution: Negative.   HENT: Negative.    Cardiovascular: Negative.    Respiratory: Negative.    Gastrointestinal: Negative.    Endocrine: negative.   Genitourinary: Negative.  Negative for frequency and urgency.   Musculoskeletal: Positive for pain and joint pain.   Skin: Negative.    Allergic/Immunologic: Negative.    Neurological: Negative.    Hematologic/Lymphatic: Negative.    Psychiatric/Behavioral: Negative.        Objective:      Physical Exam   Constitutional: She appears well-developed. No distress.   HENT:   Head: Normocephalic and atraumatic.   Ears:   Right Ear: Hearing and external ear normal.   Left Ear: Hearing and external ear normal.   Nose: Nose normal. No nasal deformity.   Eyes: Conjunctivae and lids are normal. No scleral icterus.   Neck: Trachea normal.   Cardiovascular: Normal pulses.   Pulmonary/Chest: Effort normal. No stridor. No respiratory distress.   Musculoskeletal:      Right shoulder: She exhibits tenderness. She exhibits normal range of motion.   Neurological: She is alert. She is not disoriented. No sensory deficit. GCS eye subscore is 4. GCS verbal subscore is 5. GCS motor subscore is 6.   Skin: Skin is warm, dry and not diaphoretic. Capillary refill takes less than 2 seconds.   Psychiatric: Her speech is normal and behavior is normal. She is attentive.   Nursing note and vitals reviewed.        Assessment:       1. Other sprain of right shoulder joint, subsequent encounter    2. Encounter related to worker's  compensation claim    3. Arthralgia of upper arm, unspecified laterality          Plan:       She is doing well and progressing.   Still no large buses available   Will have her continue therapy and follow up in one month.       Other sprain of right shoulder joint, subsequent encounter  -     methocarbamoL (ROBAXIN) 500 MG Tab; Take 2 tablets (1,000 mg total) by mouth 3 (three) times daily as needed (muscle spasms).  Dispense: 30 tablet; Refill: 0    Encounter related to worker's compensation claim    Arthralgia of upper arm, unspecified laterality  -     methocarbamoL (ROBAXIN) 500 MG Tab; Take 2 tablets (1,000 mg total) by mouth 3 (three) times daily as needed (muscle spasms).  Dispense: 30 tablet; Refill: 0

## 2022-08-24 NOTE — LETTER
Urgent Care - [address] [address]  Phone: [phone]  Fax: [phone]  [facility]: [email or web address]    Pt Name: Holly Julian  Injury Date: 06/06/2022   Employee ID:  Date of  Treatment: 08/24/2022   Company: [place]      Appointment Time: 09:15 AM Arrived:300   Provider: ELLIS Yepez Time Out:330     Office Treatment:   1. Other sprain of right shoulder joint, subsequent encounter    2. Encounter related to worker's compensation claim    3. Arthralgia of upper arm, unspecified laterality      Medications Ordered This Encounter   Medications    methocarbamoL (ROBAXIN) 500 MG Tab      Patient Instructions: Daily home exercises/warm soaks, Attention not to aggravate affected area, Apply ice 24-48 hours then apply heat/warm soaks, Continue Physical Therapy    Restrictions: Regular Duty     Return Appointment: 9/21/2022 at 0300pm

## 2022-08-25 ENCOUNTER — CLINICAL SUPPORT (OUTPATIENT)
Dept: REHABILITATION | Facility: OTHER | Age: 52
End: 2022-08-25
Payer: OTHER MISCELLANEOUS

## 2022-08-25 DIAGNOSIS — M62.81 MUSCLE WEAKNESS OF UPPER EXTREMITY: ICD-10-CM

## 2022-08-25 DIAGNOSIS — M25.511 ACUTE PAIN OF RIGHT SHOULDER: Primary | ICD-10-CM

## 2022-08-25 DIAGNOSIS — M25.611 DECREASED RIGHT SHOULDER RANGE OF MOTION: ICD-10-CM

## 2022-08-25 PROCEDURE — 97110 THERAPEUTIC EXERCISES: CPT | Mod: PN

## 2022-08-25 PROCEDURE — 97530 THERAPEUTIC ACTIVITIES: CPT | Mod: PN

## 2022-08-25 NOTE — PROGRESS NOTES
[facility]   Physical Therapy Progress Note     Name: Holly Julian  [facility] Number: [id number]    Therapy Diagnosis:   Encounter Diagnoses   Name Primary?    Acute pain of right shoulder Yes    Decreased right shoulder range of motion     Muscle weakness of upper extremity      Physician: Alanna Marquez FNP    Visit Date: 8/25/2022     Physician Orders: PT Eval and Treat   Medical Diagnosis from Referral:   S43.491D (ICD-10-CM) - Other sprain of right shoulder joint, subsequent encounter  M25.529 (ICD-10-CM) - Arthralgia of upper arm, unspecified laterality  M25.531 (ICD-10-CM) - Pain in wrist, right  Evaluation Date: 7/13/2022  Authorization Period Expiration: 2/11/2023  Plan of Care Expiration: 9/7/2022  Visit # / Visits authorized: 3/9, +9/9  FOTO: 1/5 8/3/2022  PTA Visit #: 1/5     Time In: 04:00pm  Time Out: 05:03pm  Total Billable Time: 63 minutes    SUBJECTIVE     HOME EXERCISE PROGRAM: States compliance with HEP  Response to previous treatment: patient reports that her shoulder is feeling 100% better since onset of PHYSICAL THERAPY POC. She reports mild pain at R shoulder, anterior - posterior.   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 Her main concern with returning to driving the large bus is the mobility for turning the wheel.     Pain: 3/10  Location: right shoulder (lateral / anterior)    OBJECTIVE   8/25/2022:   Job Specific demands: prolonged sitting, turning wheel, reaching to the R to press button to open door    Reaching at diagonal to press button to open shuttle door: able to demonstrate Range of Motion required, some fatigue with repeated motions testing >20 reps  Turning the Wheel: full Range of Motion; fatigue with repeated pronation/ supination > 1 minute and ball on wall > 20  "reps    CMS Impairment/Limitation/Restriction for FOTO Shoulder Survey    Therapist reviewed FOTO scores for Holly Julian on 8/25/2022  FOTO documents entered into EPIC - see Media section.    Limitation Score: 50%  Category: Carrying       UE MMT R L   Flexion  4- 4   Abduction  4- 4+   External rotation  3+ 4-   Internal rotation  4 4     21 Kg 20 Kg             UE ROM R L   Flexion  165 173   Abduction  179 180   External rotation  85 80   Internal rotation  70 70     Treatment     Charges based on 1-1 tx:  therapeutic exercises for 43 minutes:  +standing R UE abduction with #2 bar,  5" hold, 3 x 10   SL ER with 2# 3x10 R only  Rows mary TB, 3x10  Shld ext mary TB 3x10- palms out    ER/IR mary TB, 3x10 ea R only    Bicep curls 2# 3x10 R/L  seated R UE scap punches 2 x 10 reps, OTB  seated R UE cross body punches 2 x 10 reps  seated lateral punches 2 x 10 reps (with PB)  +seated on ball horizontal adduction with blue band, x30  +seated on ball horizontal abduction with blue band, x30  +green flex bar 'rainbow', 3", 3x10  +green flex bar 'smile', 3", 3x10  +B UE ext with blue band, 3x10  +warm up routine for work (cross arm stretch, triceps stretch x 30" each. Abduction, x 15 reps each)  +updated POC     manual therapy techniques for 00 minutes:  R/L GLENOHUMERAL JOINT Gr III/IV      Therapeutic activities for 20 minutes:   Supine alphabet with green weighted ball  +seated on ball, body blade, flex to 90 deg, 15" intervals x 5   Wall ball circles with red ball, 2x30 cw/ccw  Supine scap punches 3 x 8 reps with 3#  Wall pushups 3x10 - EOM today  Ball circles with gray PB seated x 30 ea CW/CCW  Supination / pronation with  stick 1 min x 3  Zombie carry 1# x 2 laps  +R UE wall windshield wipers, furniture slider, x 20   UBE, forward 2' fwd/retro 2', 3.0 resistance   (initiate) pilates ring squeeze + turn supination, x 30   (initiate) pilates ring squeeze  + turn pronation, x 30     Patient Education and " Home Exercises     Home Exercises Provided and Patient Education Provided     Education provided:   - return to work activities    Home Exercises Provided: Patient instructed to cont prior HEP. Exercises were reviewed and Holly was able to demonstrate them prior to the end of the session.  Holly demonstrated good  understanding of the education provided. See EMR under Patient Instructions for exercises provided during therapy sessions    ASSESSMENT   Patient presents with improving R shoulder ROM, muscle strength and foto scores since onset of PHYSICAL THERAPY POC. 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     Holly is making good progress towards meeting set goals.    Pt prognosis is Good.      Pt will continue to benefit from skilled outpatient physical therapy to address the deficits listed in the problem list box on initial evaluation, provide pt/family education and to maximize pt's level of independence in the home and community environment.     Pt's spiritual, cultural and educational needs considered and pt agreeable to plan of care and goals.     Anticipated barriers to physical therapy: see precautions    Goals:   Short Term Goals: 2 weeks   1. 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  2. Increased R shoulder strength to 4/5 for patient to reach buttons and dials in the shuttle bus at work.(progressing, not met, 8/25).     Long Term Goals: 4 weeks   3. Patient to freely turn steering wheel of the shuttle bus without restrictions.(progressing, not met, 8/25)  4. 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  Reasons for Recertification of Therapy:   Update PHYSICAL THERAPY POC     PLAN   Continue with PHYSICAL THERAPY through 6 remaining approved visits with subsequent discharge to HOME EXERCISE PROGRAM and return to full work duties.     Joanne Duke, PT     I CERTIFY THE NEED FOR THESE SERVICES FURNISHED UNDER THIS PLAN OF TREATMENT AND WHILE UNDER MY CARE    Physician's comments:        Physician's Signature: ___________________________________________________

## 2022-08-25 NOTE — PLAN OF CARE
[facility]   Physical Therapy Progress Note     Name: Holly Julian  [facility] Number: [id number]    Therapy Diagnosis:   Encounter Diagnoses   Name Primary?    Acute pain of right shoulder Yes    Decreased right shoulder range of motion     Muscle weakness of upper extremity      Physician: Alanna Marquez FNP    Visit Date: 8/25/2022     Physician Orders: PT Eval and Treat   Medical Diagnosis from Referral:   S43.491D (ICD-10-CM) - Other sprain of right shoulder joint, subsequent encounter  M25.529 (ICD-10-CM) - Arthralgia of upper arm, unspecified laterality  M25.531 (ICD-10-CM) - Pain in wrist, right  Evaluation Date: 7/13/2022  Authorization Period Expiration: 2/11/2023  Plan of Care Expiration: 9/7/2022  Visit # / Visits authorized: 3/9, +9/9  FOTO: 1/5 8/3/2022  PTA Visit #: 1/5     Time In: 04:00pm  Time Out: 05:03pm  Total Billable Time: 63 minutes    SUBJECTIVE     HOME EXERCISE PROGRAM: States compliance with HEP  Response to previous treatment: patient reports that her shoulder is feeling 100% better since onset of PHYSICAL THERAPY POC. She reports mild pain at R shoulder, anterior - posterior.   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 Her main concern with returning to driving the large bus is the mobility for turning the wheel.     Pain: 3/10  Location: right shoulder (lateral / anterior)    OBJECTIVE   8/25/2022:   Job Specific demands: prolonged sitting, turning wheel, reaching to the R to press button to open door    Reaching at diagonal to press button to open shuttle door: able to demonstrate Range of Motion required, some fatigue with repeated motions testing >20 reps  Turning the Wheel: full Range of Motion; fatigue with repeated pronation/ supination > 1 minute and ball on wall > 20  "reps    CMS Impairment/Limitation/Restriction for FOTO Shoulder Survey    Therapist reviewed FOTO scores for Holly Julian on 8/25/2022  FOTO documents entered into EPIC - see Media section.    Limitation Score: 50%  Category: Carrying       UE MMT R L   Flexion  4- 4   Abduction  4- 4+   External rotation  3+ 4-   Internal rotation  4 4     21 Kg 20 Kg             UE ROM R L   Flexion  165 173   Abduction  179 180   External rotation  85 80   Internal rotation  70 70     Treatment     Charges based on 1-1 tx:  therapeutic exercises for 43 minutes:  +standing R UE abduction with #2 bar,  5" hold, 3 x 10   SL ER with 2# 3x10 R only  Rows mary TB, 3x10  Shld ext mary TB 3x10- palms out    ER/IR mary TB, 3x10 ea R only    Bicep curls 2# 3x10 R/L  seated R UE scap punches 2 x 10 reps, OTB  seated R UE cross body punches 2 x 10 reps  seated lateral punches 2 x 10 reps (with PB)  +seated on ball horizontal adduction with blue band, x30  +seated on ball horizontal abduction with blue band, x30  +green flex bar 'rainbow', 3", 3x10  +green flex bar 'smile', 3", 3x10  +B UE ext with blue band, 3x10  +warm up routine for work (cross arm stretch, triceps stretch x 30" each. Abduction, x 15 reps each)  +updated POC     manual therapy techniques for 00 minutes:  R/L GLENOHUMERAL JOINT Gr III/IV      Therapeutic activities for 20 minutes:   Supine alphabet with green weighted ball  +seated on ball, body blade, flex to 90 deg, 15" intervals x 5   Wall ball circles with red ball, 2x30 cw/ccw  Supine scap punches 3 x 8 reps with 3#  Wall pushups 3x10 - EOM today  Ball circles with gray PB seated x 30 ea CW/CCW  Supination / pronation with  stick 1 min x 3  Zombie carry 1# x 2 laps  +R UE wall windshield wipers, furniture slider, x 20   UBE, forward 2' fwd/retro 2', 3.0 resistance   (initiate) pilates ring squeeze + turn supination, x 30   (initiate) pilates ring squeeze  + turn pronation, x 30     Patient Education and " Home Exercises     Home Exercises Provided and Patient Education Provided     Education provided:   - return to work activities    Home Exercises Provided: Patient instructed to cont prior HEP. Exercises were reviewed and Holly was able to demonstrate them prior to the end of the session.  Holly demonstrated good  understanding of the education provided. See EMR under Patient Instructions for exercises provided during therapy sessions    ASSESSMENT   Patient presents with improving R shoulder ROM, muscle strength and foto scores since onset of PHYSICAL THERAPY POC. 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     Holly is making good progress towards meeting set goals.    Pt prognosis is Good.      Pt will continue to benefit from skilled outpatient physical therapy to address the deficits listed in the problem list box on initial evaluation, provide pt/family education and to maximize pt's level of independence in the home and community environment.     Pt's spiritual, cultural and educational needs considered and pt agreeable to plan of care and goals.     Anticipated barriers to physical therapy: see precautions    Goals:   Short Term Goals: 2 weeks   1. 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  2. Increased R shoulder strength to 4/5 for patient to reach buttons and dials in the shuttle bus at work.(progressing, not met, 8/25).     Long Term Goals: 4 weeks   3. Patient to freely turn steering wheel of the shuttle bus without restrictions.(progressing, not met, 8/25)  4. 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  Reasons for Recertification of Therapy:   Update PHYSICAL THERAPY POC     PLAN   Continue with PHYSICAL THERAPY through 6 remaining approved visits with subsequent discharge to HOME EXERCISE PROGRAM and return to full work duties.     Joanne Duke, PT     I CERTIFY THE NEED FOR THESE SERVICES FURNISHED UNDER THIS PLAN OF TREATMENT AND WHILE UNDER MY CARE    Physician's comments:        Physician's Signature: ___________________________________________________

## 2022-08-30 ENCOUNTER — CLINICAL SUPPORT (OUTPATIENT)
Dept: REHABILITATION | Facility: OTHER | Age: 52
End: 2022-08-30
Payer: OTHER MISCELLANEOUS

## 2022-08-30 DIAGNOSIS — M25.511 ACUTE PAIN OF RIGHT SHOULDER: Primary | ICD-10-CM

## 2022-08-30 DIAGNOSIS — M25.611 DECREASED RIGHT SHOULDER RANGE OF MOTION: ICD-10-CM

## 2022-08-30 DIAGNOSIS — M62.81 MUSCLE WEAKNESS OF UPPER EXTREMITY: ICD-10-CM

## 2022-08-30 PROCEDURE — 97530 THERAPEUTIC ACTIVITIES: CPT | Mod: PN,CQ

## 2022-08-30 PROCEDURE — 97110 THERAPEUTIC EXERCISES: CPT | Mod: PN,CQ

## 2022-08-30 NOTE — PROGRESS NOTES
[facility]   Physical Therapy Progress Note     Name: Holly Julian  [facility] Number: [id number]    Therapy Diagnosis:   Encounter Diagnoses   Name Primary?    Acute pain of right shoulder Yes    Decreased right shoulder range of motion     Muscle weakness of upper extremity      Physician: Alanna Marquez FNP    Visit Date: 8/30/2022     Physician Orders: PT Eval and Treat   Medical Diagnosis from Referral:   S43.491D (ICD-10-CM) - Other sprain of right shoulder joint, subsequent encounter  M25.529 (ICD-10-CM) - Arthralgia of upper arm, unspecified laterality  M25.531 (ICD-10-CM) - Pain in wrist, right  Evaluation Date: 7/13/2022  Authorization Period Expiration: 2/11/2023  Plan of Care Expiration: 9/7/2022  Visit # / Visits authorized: 4/9, +9/9  FOTO: 1/5 8/3/2022      Time In: 1500  Time Out: 1604  Total Billable Time: 60 minutes    SUBJECTIVE     HOME EXERCISE PROGRAM: States compliance with HEP  Response to previous treatment: States she has been doing well. 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 Her main concern with returning to driving the large bus is the mobility for turning the wheel.     Pain: 3/10  Location: right shoulder (lateral / anterior)    OBJECTIVE   8/25/2022:   Job Specific demands: prolonged sitting, turning wheel, reaching to the R to press button to open door    Reaching at diagonal to press button to open shuttle door: able to demonstrate Range of Motion required, some fatigue with repeated motions testing >20 reps  Turning the Wheel: full Range of Motion; fatigue with repeated pronation/ supination > 1 minute and ball on wall > 20 reps    CMS  "Impairment/Limitation/Restriction for FOTO Shoulder Survey    Therapist reviewed FOTO scores for Holly Julian on 8/25/2022  FOTO documents entered into EPIC - see Media section.    Limitation Score: 50%  Category: Carrying       UE MMT R L   Flexion  4- 4   Abduction  4- 4+   External rotation  3+ 4-   Internal rotation  4 4     21 Kg 20 Kg             UE ROM R L   Flexion  165 173   Abduction  179 180   External rotation  85 80   Internal rotation  70 70     Treatment     Charges based on 1-1 tx:  therapeutic exercises for 30 minutes:  Standing R UE abduction with #2 bar,  5" hold, 3 x 10   SL ER with 2# 3x10 R only  Rows mary TB, 3x10  Shld ext mary TB 3x10- palms out    ER/IR mary TB, 3x10 ea R only    Bicep curls 2# 3x10 R/L  seated R UE scap punches 2 x 10 reps, OTB  seated R UE cross body punches 2 x 10 reps  seated lateral punches 2 x 10 reps (with PB)  Seated on ball horizontal adduction with blue band, x30  Seated on ball horizontal abduction with blue band, x30  Green flex bar 'rainbow', 3", 3x10  Green flex bar 'smile', 3", 3x10  B UE ext with blue band, 3x10  warm up routine for work (cross arm stretch, triceps stretch x 30" each. Abduction, x 15 reps each)      manual therapy techniques for 00 minutes:  R/L GLENOHUMERAL JOINT Gr III/IV JM     Therapeutic activities for  minutes:   Supine alphabet with green weighted ball  Seated on ball, body blade, flex to 90 deg, 20" intervals x 5   Wall ball circles with red ball, 2x30 cw/ccw  Supine scap punches 3 x 8 reps with 3#  Wall pushups 3x10 - EOM today  Ball circles with gray PB seated x 30 ea CW/CCW  Supination / pronation with  stick 1 min x 3  Zombie carry 1# x 2 laps  +[place] carries 1# wand, 40ftx2  R UE wall windshield wipers, furniture slider, x 20   UBE, forward 2' fwd/retro 2', 3.0 resistance   +Pilates ring squeeze + turn supination, x 30   +Pilates ring squeeze  + turn pronation, x 30     Patient Education and Home " Exercises     Home Exercises Provided and Patient Education Provided     Education provided:   - return to work activities    Home Exercises Provided: Patient instructed to cont prior HEP. Exercises were reviewed and Holly was able to demonstrate them prior to the end of the session.  Holly demonstrated good  understanding of the education provided. See EMR under Patient Instructions for exercises provided during therapy sessions    ASSESSMENT   Continued with shoulder girdle strengthening with emphasis on ADL/work performance. Strength in deltoids and scapular muscles appears to be improving as well as AROM with flexion/scaption motions.     Holly is making good progress towards meeting set goals.    Pt prognosis is Good.      Pt will continue to benefit from skilled outpatient physical therapy to address the deficits listed in the problem list box on initial evaluation, provide pt/family education and to maximize pt's level of independence in the home and community environment.     Pt's spiritual, cultural and educational needs considered and pt agreeable to plan of care and goals.     Anticipated barriers to physical therapy: see precautions    Goals:   Short Term Goals: 2 weeks   1. 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  2. Increased R shoulder strength to 4/5 for patient to reach buttons and dials in the shuttle bus at work.(progressing, not met, 8/25).     Long Term Goals: 4 weeks   3. Patient to freely turn steering wheel of the shuttle bus without restrictions.(progressing, not met, 8/25)  4. 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  Reasons for Recertification of  Therapy:   Update PHYSICAL THERAPY POC     PLAN   Continue with PHYSICAL THERAPY through 6 remaining approved visits with subsequent discharge to HOME EXERCISE PROGRAM and return to full work duties.     Omid Álvarez, PTA

## 2022-09-01 ENCOUNTER — CLINICAL SUPPORT (OUTPATIENT)
Dept: REHABILITATION | Facility: OTHER | Age: 52
End: 2022-09-01
Payer: OTHER MISCELLANEOUS

## 2022-09-01 DIAGNOSIS — M62.81 MUSCLE WEAKNESS OF UPPER EXTREMITY: ICD-10-CM

## 2022-09-01 DIAGNOSIS — M25.511 ACUTE PAIN OF RIGHT SHOULDER: Primary | ICD-10-CM

## 2022-09-01 DIAGNOSIS — M25.611 DECREASED RIGHT SHOULDER RANGE OF MOTION: ICD-10-CM

## 2022-09-01 PROCEDURE — 97110 THERAPEUTIC EXERCISES: CPT | Mod: PN,CQ

## 2022-09-01 PROCEDURE — 97530 THERAPEUTIC ACTIVITIES: CPT | Mod: PN,CQ

## 2022-09-01 NOTE — PROGRESS NOTES
[facility]   Physical Therapy Progress Note     Name: Holly Julian  [facility] Number: [id number]    Therapy Diagnosis:   Encounter Diagnoses   Name Primary?    Acute pain of right shoulder Yes    Decreased right shoulder range of motion     Muscle weakness of upper extremity      Physician: Alanna Marquez FNP    Visit Date: 9/1/2022     Physician Orders: PT Eval and Treat   Medical Diagnosis from Referral:   S43.491D (ICD-10-CM) - Other sprain of right shoulder joint, subsequent encounter  M25.529 (ICD-10-CM) - Arthralgia of upper arm, unspecified laterality  M25.531 (ICD-10-CM) - Pain in wrist, right  Evaluation Date: 7/13/2022  Authorization Period Expiration: 2/11/2023  Plan of Care Expiration: 9/7/2022  Visit # / Visits authorized: 5/9, +9/9  FOTO: 1/5 8/3/2022      Time In: 1500  Time Out: 1600  Total Billable Time: 60 minutes    SUBJECTIVE     HOME EXERCISE PROGRAM: States compliance with HEP  Response to previous treatment: States she has been doing well. 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 Her main concern with returning to driving the large bus is the mobility for turning the wheel.     Pain: 3/10  Location: right shoulder (lateral / anterior)    OBJECTIVE   8/25/2022:   Job Specific demands: prolonged sitting, turning wheel, reaching to the R to press button to open door    Reaching at diagonal to press button to open shuttle door: able to demonstrate Range of Motion required, some fatigue with repeated motions testing >20 reps  Turning the Wheel: full Range of Motion; fatigue with repeated pronation/ supination > 1 minute and ball on wall > 20 reps    CMS  "Impairment/Limitation/Restriction for FOTO Shoulder Survey    Therapist reviewed FOTO scores for Holly Julian on 8/25/2022  FOTO documents entered into EP[employer] - see Media section.    Limitation Score: 50%  Category: Carrying       UE MMT R L   Flexion  4- 4   Abduction  4- 4+   External rotation  3+ 4-   Internal rotation  4 4     21 Kg 20 Kg             UE ROM R L   Flexion  165 173   Abduction  179 180   External rotation  85 80   Internal rotation  70 70     Treatment     Charges based on 1-1 tx:  therapeutic exercises for 30 minutes:  Standing R UE abduction with #2 bar,  5" hold, 3 x 10   SL ER with 2# 3x10 R only  Rows mary TB, 3x10  Shld ext mary TB 3x10- palms out    ER/IR mary TB, 3x10 ea R only    Bicep curls 2# 3x10 R/L  seated R UE scap punches 2 x 10 reps, OTB  seated R UE cross body punches 2 x 10 reps  seated lateral punches 2 x 10 reps (with PB)  Seated on ball horizontal adduction with blue band, x30  Seated on ball horizontal abduction with blue band, x30  Green flex bar 'rainbow', 3", 3x10  Green flex bar 'smile', 3", 3x10  B UE ext with blue band, 3x10  warm up routine for work (cross arm stretch, triceps stretch x 30" each. Abduction, x 15 reps each)      manual therapy techniques for 00 minutes:  R/L GLENOHUMERAL JOINT Gr III/IV JM     Therapeutic activities for  30 minutes:   Supine alphabet with green weighted ball  Seated on ball, body blade, flex to 90 deg, 20" intervals x 5   Wall ball circles with red ball, 2x30 cw/ccw  Supine scap punches 3 x 8 reps with 3#  Wall pushups 3x10 - EOM today  Ball circles with gray PB seated x 30 ea CW/CCW  Supination / pronation with  stick 1 min x 3  Zombie carry 1# x 2 laps  Statue of [place] carries 1# wand, 40ftx2  R UE wall windshield wipers, furniture slider, x 20   UBE, forward 2' fwd/retro 2', 3.0 resistance   Pilates ring squeeze + turn supination, x 30   Pilates ring squeeze  + turn pronation, x 30     Patient Education and Home " Exercises     Home Exercises Provided and Patient Education Provided     Education provided:   - return to work activities    Home Exercises Provided: Patient instructed to cont prior HEP. Exercises were reviewed and Holly was able to demonstrate them prior to the end of the session.  Holly demonstrated good  understanding of the education provided. See EMR under Patient Instructions for exercises provided during therapy sessions    ASSESSMENT   Improved AROM with flex was noted as well as endurance during resisted exercises. Continued with work related exercises for improved ADL performance. Plan to discharge from PT to HEP per supervising PT at the end of scheduled visits. Pt reported feeling well post TX.       Holly is making good progress towards meeting set goals.    Pt prognosis is Good.      Pt will continue to benefit from skilled outpatient physical therapy to address the deficits listed in the problem list box on initial evaluation, provide pt/family education and to maximize pt's level of independence in the home and community environment.     Pt's spiritual, cultural and educational needs considered and pt agreeable to plan of care and goals.     Anticipated barriers to physical therapy: see precautions    Goals:   Short Term Goals: 2 weeks   1. 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  2. Increased R shoulder strength to 4/5 for patient to reach buttons and dials in the shuttle bus at work.(progressing, not met, 8/25).     Long Term Goals: 4 weeks   3. Patient to freely turn steering wheel of the shuttle bus without restrictions.(progressing, not met, 8/25)  4. 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  Reasons for Recertification of Therapy:   Update PHYSICAL THERAPY POC     PLAN   Continue with PHYSICAL THERAPY through 6 remaining approved visits with subsequent discharge to HOME EXERCISE PROGRAM and return to full work duties.     Omdi Álvarez, PTA

## 2022-09-06 ENCOUNTER — DOCUMENTATION ONLY (OUTPATIENT)
Dept: REHABILITATION | Facility: OTHER | Age: 52
End: 2022-09-06
Payer: OTHER MISCELLANEOUS

## 2022-09-06 DIAGNOSIS — M62.81 MUSCLE WEAKNESS OF UPPER EXTREMITY: ICD-10-CM

## 2022-09-06 DIAGNOSIS — M25.611 DECREASED RIGHT SHOULDER RANGE OF MOTION: ICD-10-CM

## 2022-09-06 DIAGNOSIS — M25.511 ACUTE PAIN OF RIGHT SHOULDER: Primary | ICD-10-CM
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
## 2022-09-12 ENCOUNTER — CLINICAL SUPPORT (OUTPATIENT)
Dept: REHABILITATION | Facility: OTHER | Age: 52
End: 2022-09-12
Payer: OTHER MISCELLANEOUS

## 2022-09-12 DIAGNOSIS — M25.511 ACUTE PAIN OF RIGHT SHOULDER: Primary | ICD-10-CM

## 2022-09-12 DIAGNOSIS — M62.81 MUSCLE WEAKNESS OF UPPER EXTREMITY: ICD-10-CM

## 2022-09-12 DIAGNOSIS — M25.611 DECREASED RIGHT SHOULDER RANGE OF MOTION: ICD-10-CM

## 2022-09-12 PROCEDURE — 97530 THERAPEUTIC ACTIVITIES: CPT | Mod: PN

## 2022-09-12 PROCEDURE — 97110 THERAPEUTIC EXERCISES: CPT | Mod: PN

## 2022-09-12 NOTE — PROGRESS NOTES
[facility]   Physical Therapy Progress Note     Name: Holly Julian  Clinic Number: [id number]    Therapy Diagnosis:   Encounter Diagnoses   Name Primary?    Acute pain of right shoulder Yes    Decreased right shoulder range of motion     Muscle weakness of upper extremity        Physician: Alanna Marquez FNP    Visit Date: 9/12/2022     Physician Orders: PT Eval and Treat   Medical Diagnosis from Referral:   S43.491D (ICD-10-CM) - Other sprain of right shoulder joint, subsequent encounter  M25.529 (ICD-10-CM) - Arthralgia of upper arm, unspecified laterality  M25.531 (ICD-10-CM) - Pain in wrist, right  Evaluation Date: 7/13/2022  Authorization Period Expiration: 2/11/2023  Plan of Care Expiration: 9/16/2022  Visit # / Visits authorized: 6/9, +9/9  FOTO: 3/5 8/30/2022      Time In: 2:55 pm  Time Out: 3:40 pm  Total Billable Time: 45 minutes    SUBJECTIVE   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 Turning the steering wheel to the left can be painful, but states she is ready for discharge soon.      HOME EXERCISE PROGRAM: States compliance with HEP  Response to previous treatment: no adverse effects  Function: currently driving bus for work     Work status: patient has returned to driving larger bus. Pain with turning to the left, IT for R shoulderl.     Pain: 1-2/10  Location: right shoulder (lateral / anterior)    OBJECTIVE   8/25/2022:   Job Specific demands: prolonged sitting, turning wheel, reaching to the R to press button to open door    Reaching at diagonal to press button to open shuttle door: able to demonstrate Range of Motion required, some fatigue with repeated motions testing >20 reps  Turning the Wheel: full Range of Motion; fatigue with repeated pronation/ supination > 1 minute and ball on wall > 20 reps    CMS Impairment/Limitation/Restriction for FOTO Shoulder Survey    Therapist reviewed FOTO scores for Holly Julian on  "8/30/2022  FOTO documents entered into [facility] - see Media section.    Limitation Score: 53%  Category: Carrying       UE MMT R L   Flexion  4- 4   Abduction  4- 4+   External rotation  3+ 4-   Internal rotation  4 4     21 Kg 20 Kg             UE ROM R L   Flexion  165 173   Abduction  179 180   External rotation  85 80   Internal rotation  70 70     Treatment     Charges based on 1-1 tx:  therapeutic exercises for 20 minutes:  UBE, forward 2' fwd/retro 2', 3.0 resistance   Standing R UE abduction with #2 dowel,  5" hold, 3 x 10   SL ER with 2# 3x10 R only  Rows mary TB, 3x10  Shld ext mary TB 3x10- palms out    ER/IR mary TB, 3x10 ea R only    Bicep curls 2# 3x10 R/L  seated R UE scap punches 2 x 10 reps, OTB  seated R UE cross body punches 2 x 10 reps  seated lateral punches 2 x 10 reps (with PB)  Seated on ball horizontal adduction with blue band, x30  Seated on ball horizontal abduction with blue band, x30  Green flex bar 'rainbow', 3", 3x10  Green flex bar 'smile', 3", 3x10  B UE ext with blue band, 3x10  warm up routine for work (cross arm stretch, triceps stretch x 30" each. Abduction, x 15 reps each)      manual therapy techniques for 00 minutes:  R/L GLENOHUMERAL JOINT Gr III/IV JM     Therapeutic activities for 25 minutes:   Supine alphabet with green weighted ball  Seated on ball, body blade, flex to 90 deg, 20" intervals x 5   Wall ball circles with red ball, 2x30 cw/ccw  Supine scap punches 3 x 8 reps with 3#  pushups on EOM 3x10   Ball circles with gray PB seated x 30 ea CW/CCW  Supination / pronation with  stick 1 min x 3  Zombie carry 1# x 2 laps  Statue of [place] carries 1# wand, 40ftx2  R UE wall windshield wipers, furniture slider, x 20     Pilates ring squeeze + turn supination, x 30   Pilates ring squeeze  + turn pronation, x 30     Patient Education and Home Exercises     Home Exercises Provided and Patient Education Provided     Education provided:   - return to work " activities    Home Exercises Provided: Patient instructed to cont prior HEP. Exercises were reviewed and Holly was able to demonstrate them prior to the end of the session.  Holly demonstrated good  understanding of the education provided. See EMR under Patient Instructions for exercises provided during therapy sessions    ASSESSMENT   Improved AROM with flex was noted as well as endurance during resisted exercises. Continued with work related exercises for improved ADL performance. Plan to discharge from PT to HEP per supervising PT at the end of scheduled visits. Pt reported feeling well post TX.       Holly is making good progress towards meeting set goals.    Pt prognosis is Good.      Pt will continue to benefit from skilled outpatient physical therapy to address the deficits listed in the problem list box on initial evaluation, provide pt/family education and to maximize pt's level of independence in the home and community environment.     Pt's spiritual, cultural and educational needs considered and pt agreeable to plan of care and goals.     Anticipated barriers to physical therapy: see precautions    Goals:   Short Term Goals: 2 weeks   1. 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  2. Increased R shoulder strength to 4/5 for patient to reach buttons and dials in the shuttle bus at work.(progressing, not met, 8/25).     Long Term Goals: 4 weeks   3. Patient to freely turn steering wheel of the shuttle bus without restrictions.(progressing, not met, 8/25)  4. 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  Reasons for Recertification of Therapy:   Update PHYSICAL THERAPY POC      PLAN   Continue with PHYSICAL THERAPY through 6 remaining approved visits with subsequent discharge to HOME EXERCISE PROGRAM and return to full work duties.     Jose Wagoner, PT

## 2022-09-14 ENCOUNTER — CLINICAL SUPPORT (OUTPATIENT)
Dept: REHABILITATION | Facility: OTHER | Age: 52
End: 2022-09-14
Payer: OTHER MISCELLANEOUS

## 2022-09-14 DIAGNOSIS — M25.611 DECREASED RIGHT SHOULDER RANGE OF MOTION: ICD-10-CM

## 2022-09-14 DIAGNOSIS — M62.81 MUSCLE WEAKNESS OF UPPER EXTREMITY: ICD-10-CM

## 2022-09-14 DIAGNOSIS — M25.511 ACUTE PAIN OF RIGHT SHOULDER: Primary | ICD-10-CM

## 2022-09-14 PROCEDURE — 97110 THERAPEUTIC EXERCISES: CPT | Mod: PN

## 2022-09-14 PROCEDURE — 97530 THERAPEUTIC ACTIVITIES: CPT | Mod: PN

## 2022-09-14 NOTE — PROGRESS NOTES
[facility]   Physical Therapy Progress Note     Name: Holly Julian  Clinic Number: [id number]    Therapy Diagnosis:   Encounter Diagnoses   Name Primary?    Acute pain of right shoulder Yes    Decreased right shoulder range of motion     Muscle weakness of upper extremity          Physician: Alanna Marquez FNP    Visit Date: 9/14/2022     Physician Orders: PT Eval and Treat   Medical Diagnosis from Referral:   S43.491D (ICD-10-CM) - Other sprain of right shoulder joint, subsequent encounter  M25.529 (ICD-10-CM) - Arthralgia of upper arm, unspecified laterality  M25.531 (ICD-10-CM) - Pain in wrist, right  Evaluation Date: 7/13/2022  Authorization Period Expiration: 2/11/2023  Plan of Care Expiration: 9/16/2022  Visit # / Visits authorized: 7/9, +9/9  FOTO: 1/5 89/14/2022      Time In: 3:16 pm  Time Out: 4:01 pm  Total Billable Time: 45 minutes    SUBJECTIVE   States she can have a little pain in her shoulder at work, but not nearly what she was experiencing when she first started therapy. Is looking forward to discharge.      HOME EXERCISE PROGRAM: States compliance with HEP  Response to previous treatment: no adverse effects  Function: currently driving bus for work     Work status: patient has returned to driving larger bus. Pain with turning to the left, IT for R shoulderl.     Pain: 2/10  Location: right shoulder (lateral / anterior)    OBJECTIVE   8/25/2022:   Job Specific demands: prolonged sitting, turning wheel, reaching to the R to press button to open door    Reaching at diagonal to press button to open shuttle door: able to demonstrate Range of Motion required, some fatigue with repeated motions testing >20 reps  Turning the Wheel: full Range of Motion; fatigue with repeated pronation/ supination > 1 minute and ball on wall > 20 reps    CMS Impairment/Limitation/Restriction for FOTO Shoulder Survey    Therapist reviewed FOTO scores for Holly Julian on 9/14/2022  FOTO  "documents entered into EPIC - see Media section.    Limitation Score: 56%  Category: Carrying       UE MMT R L   Flexion  4- 4   Abduction  4- 4+   External rotation  3+ 4-   Internal rotation  4 4     21 Kg 20 Kg             UE ROM R L   Flexion  165 173   Abduction  179 180   External rotation  85 80   Internal rotation  70 70     Treatment     Charges based on 1-1 tx:  therapeutic exercises for 20 minutes:  UBE, forward 2' fwd/retro 2', 3.0 resistance   Standing R UE abduction with #2 dowel,  5" hold, 3 x 10   SL ER with 2# 3x10 R only  Rows mary TB, 3x10  Shld ext mary TB 3x10- palms out    ER/IR mary TB, 3x10 ea R only    Bicep curls 2# 3x10 R/L  seated R UE scap punches 2 x 10 reps, OTB  seated R UE cross body punches 2 x 10 reps  seated lateral punches 2 x 10 reps (with PB)  Seated on ball horizontal adduction with blue band, x 30  Seated on ball horizontal abduction with blue band, x 30  Green flex bar 'rainbow', 3", 3x10  Green flex bar 'smile', 3", 3x10  B UE ext with blue band, 3x10  warm up routine for work (cross arm stretch, triceps stretch x 30" each. Abduction, x 15 reps each)      manual therapy techniques for 00 minutes:  R/L GLENOHUMERAL JOINT Gr III/IV JM     Therapeutic activities for 25 minutes:   Supine alphabet with green weighted ball  Seated on ball, body blade, flex to 90 deg, 20" intervals x 5   Wall ball circles with red ball, 2x30 cw/ccw  Supine scap punches 3 x 8 reps with 3#  pushups on EOM 3x10   Ball circles with gray PB seated x 30 ea CW/CCW  Supination / pronation with  stick 1 min x 3  Zombie carry 1# x 2 laps  Statue of [place] carries 1# wand, 40ft x 2  R UE wall windshield wipers, furniture slider, x 20     Pilates ring squeeze + turn supination, x 30   Pilates ring squeeze + turn pronation, x 30     Patient Education and Home Exercises     Home Exercises Provided and Patient Education Provided     Education provided:   - return to work activities    Home Exercises " Provided: Patient instructed to cont prior HEP. Exercises were reviewed and Holly was able to demonstrate them prior to the end of the session.  Holly demonstrated good  understanding of the education provided. See EMR under Patient Instructions for exercises provided during therapy sessions    ASSESSMENT   Anticipated discharge next visit. Has returned to work driving the bus and not having significant pain increased into her shoulder.      Holly is making good progress towards meeting set goals.    Pt prognosis is Good.      Pt will continue to benefit from skilled outpatient physical therapy to address the deficits listed in the problem list box on initial evaluation, provide pt/family education and to maximize pt's level of independence in the home and community environment.     Pt's spiritual, cultural and educational needs considered and pt agreeable to plan of care and goals.     Anticipated barriers to physical therapy: see precautions    Goals:   Short Term Goals: 2 weeks   1. 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  2. Increased R shoulder strength to 4/5 for patient to reach buttons and dials in the shuttle bus at work.(progressing, not met, 8/25).     Long Term Goals: 4 weeks   3. Patient to freely turn steering wheel of the shuttle bus without restrictions.(progressing, not met, 8/25)  4. 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  Reasons for Recertification of Therapy:   Update PHYSICAL THERAPY POC     PLAN   Continue with PHYSICAL THERAPY through 6 remaining approved visits with subsequent discharge to HOME EXERCISE PROGRAM and return to full work duties.      Jose Wagoner, PT

## 2022-09-19 ENCOUNTER — CLINICAL SUPPORT (OUTPATIENT)
Dept: REHABILITATION | Facility: OTHER | Age: 52
End: 2022-09-19
Payer: OTHER MISCELLANEOUS

## 2022-09-19 DIAGNOSIS — M25.611 DECREASED RIGHT SHOULDER RANGE OF MOTION: ICD-10-CM

## 2022-09-19 DIAGNOSIS — M62.81 MUSCLE WEAKNESS OF UPPER EXTREMITY: ICD-10-CM

## 2022-09-19 DIAGNOSIS — M25.511 ACUTE PAIN OF RIGHT SHOULDER: Primary | ICD-10-CM

## 2022-09-19 PROCEDURE — 97110 THERAPEUTIC EXERCISES: CPT | Mod: PN

## 2022-09-19 NOTE — PROGRESS NOTES
[facility]   Physical Therapy Discharge Summary    Name: Holly Julian  Clinic Number: [id number]    Therapy Diagnosis:   Encounter Diagnoses   Name Primary?    Acute pain of right shoulder Yes    Decreased right shoulder range of motion     Muscle weakness of upper extremity          Physician: Alanna Marquez FNP    Visit Date: 9/19/2022     Physician Orders: PT Eval and Treat   Medical Diagnosis from Referral:   S43.491D (ICD-10-CM) - Other sprain of right shoulder joint, subsequent encounter  M25.529 (ICD-10-CM) - Arthralgia of upper arm, unspecified laterality  M25.531 (ICD-10-CM) - Pain in wrist, right  Evaluation Date: 7/13/2022  Authorization Period Expiration: 2/11/2023  Plan of Care Expiration: 9/16/2022  Visit # / Visits authorized: 8/9, +9/9  FOTO: 1/5 89/14/2022      Time In: 3:10 pm  Time Out: 4:00 pm  Total Billable Time: 50 minutes      States she continues to have mild pain at work especially with 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 She stated that she is looking forward to discharge.      HOME EXERCISE PROGRAM: States compliance with HEP  Response to previous treatment: no adverse effects  Function: currently driving bus for work     Work status: patient has returned to driving larger bus. Pain with turning to the left, IT for R shoulder.     Pain: 2/10  Location: right shoulder (lateral / anterior)    OBJECTIVE   8/25/2022:   Job Specific demands: prolonged sitting, turning wheel, reaching to the R to press button to open door    Reaching at diagonal to press button to open shuttle door: able to demonstrate Range of Motion required, some fatigue with repeated motions testing >20 reps  Turning the Wheel: full Range of Motion; fatigue with repeated pronation/ supination > 1 minute and ball on wall > 20 reps    CMS Impairment/Limitation/Restriction for FOTO Shoulder Survey    Therapist reviewed FOTO scores for Holly Julian on 9/14/2022  FOTO  "documents entered into EPIC - see Media section.    Limitation Score: 56%  Category: Carrying       UE MMT R L   Flexion  4- 4   Abduction  4- 4+   External rotation  4' 4-   Internal rotation  4 4     21 Kg 20 Kg             UE ROM R L   Flexion  164 165   Abduction  164 174   External rotation  74 82   Internal rotation  70 74     Treatment     Charges based on 1-1 tx:  therapeutic exercises for 20 minutes:  UBE, forward 3' fwd/retro 3', 3.0 resistance   Standing R UE abduction with #2 dowel,  5" hold, 3 x 10   SL ER with 2# 3x10 R only  Rows mary TB, 3x10  Shld ext mary TB 3x10- palms out    ER/IR mary TB, 3x10 ea R only    Bicep curls 2# 3x10 R/L  seated R UE scap punches 2 x 10 reps, OTB  seated R UE cross body punches 2 x 10 reps  seated lateral punches 2 x 10 reps (with PB)  Seated on ball horizontal adduction with blue band, x 30  Seated on ball horizontal abduction with blue band, x 30  Green flex bar 'rainbow', 3", 3x10  Green flex bar 'smile', 3", 3x10  B UE ext with blue band, 3x10  warm up routine for work (cross arm stretch, triceps stretch x 30" each. Abduction, x 15 reps each)      manual therapy techniques for 00 minutes:  R/L GLENOHUMERAL JOINT Gr III/IV JM     Therapeutic activities for 25 minutes:   Supine alphabet with green weighted ball  Seated on ball, body blade, flex to 90 deg, 20" intervals x 5   Wall ball circles with red ball, 2x30 cw/ccw  Supine scap punches 3 x 8 reps with 3#  pushups on EOM 4x10   Ball circles with gray PB seated x 30 ea CW/CCW  Supination / pronation with  stick 1 min x 3  Zombie carry 1# x 2 laps  [place] carries 1# wand, 40ft x 2  R UE wall windshield wipers, furniture slider, x 20     Pilates ring squeeze + turn supination, x 30   Pilates ring squeeze + turn pronation, x 30     Patient Education and Home Exercises     Home Exercises Provided and Patient Education Provided     Education provided:   - return to work activities    Home Exercises " Provided: Patient instructed to cont prior HEP. Exercises were reviewed and Holly was able to demonstrate them prior to the end of the session.  Holly demonstrated good  understanding of the education provided. See EMR under Patient Instructions for exercises provided during therapy sessions    ASSESSMENT   Pt reports returned to work driving the bus and not having significant pain increased into her shoulder. She continues to lack some ROM at B shoulder, and R shoulder horizontal adduction seems to be the most difficult motion for her. Pain continues to be minimal, and she is able to perform work specific tasks without limitation. She reports compliance with HEP.     Pt's spiritual, cultural and educational needs considered and pt agreeable to plan of care and goals.     Anticipated barriers to physical therapy: see precautions    Goals:   Short Term Goals: 2 weeks   1. Increase R shoulder AROM to 160 deg for patient to reach overhead to press buttons in the shuttle bus at work. (met).  2. Increased R shoulder strength to 4/5 for patient to reach buttons and dials in the shuttle bus at work. (met).     Long Term Goals: 4 weeks   3. Patient to freely turn steering wheel of the shuttle bus without restrictions. (met).  4. 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 (met).    Previous Short Term Goals Status:   Progressing   New Short Term Goals Status:  Continue per initial plan of care    Long Term Goal Status:  Continue per initial plan of care.  Reasons for Recertification of Therapy:   Update PHYSICAL THERAPY POC     PLAN   Pt is being DC from outpatient physical therapy with HEP    Gage Lopez, PT

## 2022-09-23 ENCOUNTER — TELEPHONE (OUTPATIENT)
Dept: URGENT CARE | Facility: CLINIC | Age: 52
End: 2022-09-23
Payer: OTHER MISCELLANEOUS

## 2022-09-23 NOTE — TELEPHONE ENCOUNTER
Called patient, unable to leave a message and call back number regarding missed [facility] appointment. AFG

## 2022-09-29 ENCOUNTER — OFFICE VISIT (OUTPATIENT)
Dept: URGENT CARE | Facility: CLINIC | Age: 52
End: 2022-09-29
Payer: OTHER MISCELLANEOUS

## 2022-09-29 VITALS
SYSTOLIC BLOOD PRESSURE: 134 MMHG | BODY MASS INDEX: 34.68 KG/M2 | TEMPERATURE: 99 F | WEIGHT: 172 LBS | HEIGHT: 59 IN | HEART RATE: 75 BPM | DIASTOLIC BLOOD PRESSURE: 68 MMHG

## 2022-09-29 DIAGNOSIS — M25.531 PAIN IN WRIST, RIGHT: ICD-10-CM

## 2022-09-29 DIAGNOSIS — S43.491D OTHER SPRAIN OF RIGHT SHOULDER JOINT, SUBSEQUENT ENCOUNTER: Primary | ICD-10-CM

## 2022-09-29 DIAGNOSIS — M25.529 ARTHRALGIA OF UPPER ARM, UNSPECIFIED LATERALITY: ICD-10-CM

## 2022-09-29 PROCEDURE — 99213 OFFICE O/P EST LOW 20 MIN: CPT | Mod: S$GLB,,, | Performed by: NURSE PRACTITIONER

## 2022-09-29 PROCEDURE — 99213 PR OFFICE/OUTPT VISIT, EST, LEVL III, 20-29 MIN: ICD-10-PCS | Mod: S$GLB,,, | Performed by: NURSE PRACTITIONER

## 2022-09-29 NOTE — LETTER
Urgent Care - [address] [address]  Phone: [phone]  Fax: [phone]  [facility]: [id number]    Pt Name: Holly Julian  Injury Date: 06/06/2022   Employee ID: [id number] Date of Treatment: 09/29/2022   Company: [employer]      Appointment Time: 02:45 PM Arrived: 3:00PM   Provider: ELLIS Yepez Time Out: 4:40PM     Office Treatment:   1. Other sprain of right shoulder joint, subsequent encounter    2. Arthralgia of upper arm, unspecified laterality    3. Pain in wrist, right          Patient Instructions: Attention not to aggravate affected area, Daily home exercises/warm soaks, Continue Physical Therapy, Referral to specialist to be scheduled, once authorized      Restrictions: Regular Duty     Return Appointment: 10/13/2022 at 3:00PM    CINDI
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
## 2022-10-13 ENCOUNTER — OFFICE VISIT (OUTPATIENT)
Dept: URGENT CARE | Facility: CLINIC | Age: 52
End: 2022-10-13
Payer: OTHER MISCELLANEOUS

## 2022-10-13 VITALS
HEIGHT: 59 IN | SYSTOLIC BLOOD PRESSURE: 144 MMHG | OXYGEN SATURATION: 98 % | WEIGHT: 186 LBS | BODY MASS INDEX: 37.5 KG/M2 | DIASTOLIC BLOOD PRESSURE: 65 MMHG | HEART RATE: 72 BPM | TEMPERATURE: 99 F

## 2022-10-13 DIAGNOSIS — Z02.6 ENCOUNTER RELATED TO WORKER'S COMPENSATION CLAIM: ICD-10-CM

## 2022-10-13 DIAGNOSIS — S43.491D OTHER SPRAIN OF RIGHT SHOULDER JOINT, SUBSEQUENT ENCOUNTER: Primary | ICD-10-CM
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
## 2022-10-13 PROCEDURE — 99213 PR OFFICE/OUTPT VISIT, EST, LEVL III, 20-29 MIN: ICD-10-PCS | Mod: S$GLB,,, | Performed by: PHYSICIAN ASSISTANT

## 2022-10-13 PROCEDURE — 99213 OFFICE O/P EST LOW 20 MIN: CPT | Mod: S$GLB,,, | Performed by: PHYSICIAN ASSISTANT

## 2022-10-13 NOTE — LETTER
Urgent Care - [address] [address]  Phone: [phone]  Fax: [phone]  [facility]: [id number]    Pt Name: Holly Julian  Injury Date: 06/06/2022   Employee ID: [id number] Date of Treatment: 10/13/2022   Company: [employer]      Appointment Time: 02:45 PM Arrived: 3:40pm   Provider: Blue Rodarte PA-C Time Out: 4:50pm     Office Treatment:   1. Other sprain of right shoulder joint, subsequent encounter    2. Encounter related to worker's compensation claim    3. Work related injury          Patient Instructions: Daily home exercises/warm soaks      Restrictions: Regular Duty (Ortho to schedule.)     Return Appointment: 10/27/2022 at 2:00pm    monae

## 2022-10-13 NOTE — PROGRESS NOTES
Subjective:       Patient ID: Holly Julian is a 52 y.o. female.    Chief Complaint: Shoulder Pain (R shoulder pain//ksd)    Patient's place of employment - [employer]  Patient's job title -   Date of Injury - 06/06/2022  Body part injured - R shoulder  Current work status per last visit - Full time  Improved, same, or worse -  Same. Patient has been waiting to be contacted to set an appointment to receive a steroid injection in r shoulder. Has not been contacted.    ksd    Shoulder Pain   Pertinent negatives include no numbness.   Musculoskeletal:  Positive for pain and joint pain.   Neurological:  Negative for numbness and tingling.      Objective:      Physical Exam  Vitals and nursing note reviewed.   Constitutional:       General: She is not in acute distress.     Appearance: She is well-developed. She is not diaphoretic.   HENT:      Head: Normocephalic and atraumatic.      Right Ear: Hearing and external ear normal.      Left Ear: Hearing and external ear normal.      Nose: Nose normal. No nasal deformity.   Eyes:      General: Lids are normal. No scleral icterus.     Conjunctiva/sclera: Conjunctivae normal.   Neck:      Trachea: Trachea normal.   Cardiovascular:      Pulses: Normal pulses.   Pulmonary:      Effort: Pulmonary effort is normal. No respiratory distress.      Breath sounds: No stridor.   Musculoskeletal:      Right shoulder: Tenderness (anterior aspect) present. No swelling or deformity. Decreased range of motion (IR L2 (L L1)). Normal pulse.      Cervical back: Normal range of motion.      Comments: Right shoulder: Ji, O'Issac negative, Speed's positive   Skin:     General: Skin is warm and dry.      Capillary Refill: Capillary refill takes less than 2 seconds.   Neurological:      Mental Status: She is alert. She is not disoriented.      GCS: GCS eye subscore is 4. GCS verbal subscore is 5. GCS motor subscore is 6.      Sensory: No sensory deficit.   Psychiatric:          Attention and Perception: She is attentive.         Speech: Speech normal.         Behavior: Behavior normal.       Assessment:       1. Other sprain of right shoulder joint, subsequent encounter    2. Encounter related to worker's compensation claim    3. Work related injury          Plan:       Physical exam consistent with biceps tendinitis.  Patient awaiting scheduling by Ortho for steroid injection.  Ortho referral has been authorized.  I will contact [facility] for scheduling.     Patient Instructions: Daily home exercises/warm soaks   Restrictions: Regular Duty (Ortho to schedule.)  Follow up in about 2 weeks (around 10/27/2022).

## 2022-10-26 ENCOUNTER — TELEPHONE (OUTPATIENT)
Dept: ORTHOPEDICS | Facility: CLINIC | Age: 52
End: 2022-10-26
Payer: OTHER MISCELLANEOUS

## 2022-10-26 NOTE — TELEPHONE ENCOUNTER
Left a detailed VM message for patient to return the call in regards to scheduling an appointment for her W/C right shoulder injury. We left our contact number of [phone]. Sent a message to Jama Yancey that we tried to contact the patient.

## 2022-10-28 ENCOUNTER — TELEPHONE (OUTPATIENT)
Dept: ORTHOPEDICS | Facility: CLINIC | Age: 52
End: 2022-10-28
Payer: OTHER MISCELLANEOUS

## 2022-10-28 DIAGNOSIS — M25.511 RIGHT SHOULDER PAIN, UNSPECIFIED CHRONICITY: Primary | ICD-10-CM

## 2022-10-28 NOTE — TELEPHONE ENCOUNTER
Spoke to patient and she was scheduled for Tuesday, 11/1/2022 with Dr. Medina at 4:15 a.m. and x-rays at 3:30 pm. Patient is a W/C case and has approval under referrals. Patient verbalized understanding of the location,  date and time of the appt.

## 2022-11-01 ENCOUNTER — HOSPITAL ENCOUNTER (OUTPATIENT)
Dept: RADIOLOGY | Facility: OTHER | Age: 52
Discharge: HOME | End: 2022-11-01
Attending: ORTHOPAEDIC SURGERY
Payer: OTHER MISCELLANEOUS

## 2022-11-01 ENCOUNTER — TELEPHONE (OUTPATIENT)
Dept: ORTHOPEDICS | Facility: CLINIC | Age: 52
End: 2022-11-01
Payer: OTHER MISCELLANEOUS

## 2022-11-01 DIAGNOSIS — M25.511 RIGHT SHOULDER PAIN, UNSPECIFIED CHRONICITY: ICD-10-CM

## 2022-11-01 PROCEDURE — 73030 X-RAY EXAM OF SHOULDER: CPT | Mod: TC,FY,RT

## 2022-11-01 PROCEDURE — 73030 XR SHOULDER COMPLETE 2 OR MORE VIEWS RIGHT: ICD-10-PCS | Mod: 26,RT,, | Performed by: RADIOLOGY

## 2022-11-01 PROCEDURE — 73030 X-RAY EXAM OF SHOULDER: CPT | Mod: 26,RT,, | Performed by: RADIOLOGY

## 2022-11-01 NOTE — TELEPHONE ENCOUNTER
Spoke c pt. Notified of cancellation and that she would hear from Dr. Hahn's office upon their review of her case. Patient verbalized understanding and was thankful.           Per Alicia Hernandez [facility], reached out to pt to cancel r/s appt 11/02/22 with Dr. Hahn to allow for him to review her R shoulder WC case prior to scheduling.     LVM for patient explaining the circumstances and informed her that should would receive a call from his office pending his review for next steps.     Appt canceled and Alicia notified.

## 2022-11-30 ENCOUNTER — OFFICE VISIT (OUTPATIENT)
Dept: SPORTS MEDICINE | Facility: CLINIC | Age: 52
End: 2022-11-30
Payer: OTHER MISCELLANEOUS

## 2022-11-30 VITALS
HEIGHT: 59 IN | DIASTOLIC BLOOD PRESSURE: 93 MMHG | BODY MASS INDEX: 37.5 KG/M2 | HEART RATE: 103 BPM | WEIGHT: 186 LBS | SYSTOLIC BLOOD PRESSURE: 152 MMHG

## 2022-11-30 DIAGNOSIS — M25.611 DECREASED RIGHT SHOULDER RANGE OF MOTION: ICD-10-CM

## 2022-11-30 DIAGNOSIS — M12.811 ROTATOR CUFF ARTHROPATHY OF RIGHT SHOULDER: ICD-10-CM

## 2022-11-30 DIAGNOSIS — M25.511 ACUTE PAIN OF RIGHT SHOULDER: Primary | ICD-10-CM

## 2022-11-30 PROCEDURE — 99999 PR PBB SHADOW E&M-EST. PATIENT-LVL III: CPT | Mod: PBBFAC,,, | Performed by: ORTHOPAEDIC SURGERY

## 2022-11-30 PROCEDURE — 99999 PR PBB SHADOW E&M-EST. PATIENT-LVL III: ICD-10-PCS | Mod: PBBFAC,,, | Performed by: ORTHOPAEDIC SURGERY

## 2022-11-30 PROCEDURE — 99204 PR OFFICE/OUTPT VISIT, NEW, LEVL IV, 45-59 MIN: ICD-10-PCS | Mod: S$GLB,,, | Performed by: ORTHOPAEDIC SURGERY

## 2022-11-30 PROCEDURE — 99204 OFFICE O/P NEW MOD 45 MIN: CPT | Mod: S$GLB,,, | Performed by: ORTHOPAEDIC SURGERY
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
## 2022-12-14 ENCOUNTER — HOSPITAL ENCOUNTER (OUTPATIENT)
Dept: RADIOLOGY | Facility: HOSPITAL | Age: 52
Discharge: HOME | End: 2022-12-14
Attending: STUDENT IN AN ORGANIZED HEALTH CARE EDUCATION/TRAINING PROGRAM
Payer: OTHER MISCELLANEOUS

## 2022-12-14 DIAGNOSIS — M25.511 ACUTE PAIN OF RIGHT SHOULDER: ICD-10-CM

## 2022-12-14 DIAGNOSIS — M12.811 ROTATOR CUFF ARTHROPATHY OF RIGHT SHOULDER: ICD-10-CM

## 2022-12-14 DIAGNOSIS — M25.611 DECREASED RIGHT SHOULDER RANGE OF MOTION: ICD-10-CM

## 2022-12-14 PROCEDURE — 73221 MRI JOINT UPR EXTREM W/O DYE: CPT | Mod: 26,RT,, | Performed by: RADIOLOGY

## 2022-12-14 PROCEDURE — 73221 MRI JOINT UPR EXTREM W/O DYE: CPT | Mod: TC,RT

## 2022-12-14 PROCEDURE — 73221 MRI SHOULDER WITHOUT CONTRAST RIGHT: ICD-10-PCS | Mod: 26,RT,, | Performed by: RADIOLOGY

## 2022-12-20 ENCOUNTER — TELEPHONE (OUTPATIENT)
Dept: SPORTS MEDICINE | Facility: CLINIC | Age: 52
End: 2022-12-20
Payer: OTHER MISCELLANEOUS

## 2022-12-20 NOTE — TELEPHONE ENCOUNTER
Attempted to call patient's listed number x2 on her chart to review her MRI and number isn't a working number. If in the event the patient attempts to contact our clinic see below for the following recommendations.    MRI Right shoulder reviewed and interpreted personally by me: Possible partial thickness supraspinatus tear without atrophy or retraction, but likely a full thickness tear despite being a limited study. SLAP, biceps tendinitis, + insufficiency fracture greater tuberosity  + AC moderate hypertrophy, degenerative changes    ASSESSMENT:    Right Shoulder Rotator Cuff Tear, SLAP, biceps tendonitis.      she would benefit from an arthroscopy, given the above.       PLAN:   Right Shoulder rotator cuff tear     All questions were answered, pt will contact us for questions or concerns in the interim.  Had thorough discussion of non-operative vs operative intervention, and risks and benefits of both.     We have discussed the surgery and recovery of arthroscopic shoulder surgery. she understands that there may be limited mobility up to several weeks after surgery depending on procedures that are performed at the time of surgery.    The spectrum of treatment options were discussed with the patient, including nonoperative and operative options.  After thorough discussion, the patient has elected to undergo surgical treatment to include:    right   a. Shoulder arthroscopic rotator cuff debridement vs. repair with Cuffmend   b. Shoulder arthroscopic SAD   c. Shoulder arthroscopic extensive debridement   d. Shoulder arthroscopic biceps tenodesis   e. Shoulder arthroscopic possible microfracture   f.  Shoulder arthroscopic DCE   g.  Shoulder open reduction internal fixation greater tuberosity    The details of the surgical procedure will be explained, including the location of probable incisions and a description of likely hardware and/or grafts to be used.  The patient understands the likely convalescence after  surgery.  Also, we will thoroughly discussed the risks, benefits and alternatives to surgery, including, but not limited to, the risk of infection, joint stiffness, blood clot (including DVT and/or pulmonary embolus), neurologic and vascular injury.  It will be explained that, if tissue has been repaired or reconstructed, there is a chance of failure, which may require further management.

## 2023-03-16 ENCOUNTER — TELEPHONE (OUTPATIENT)
Dept: SPORTS MEDICINE | Facility: CLINIC | Age: 53
End: 2023-03-16
Payer: OTHER MISCELLANEOUS

## 2023-03-29 ENCOUNTER — OFFICE VISIT (OUTPATIENT)
Dept: SPORTS MEDICINE | Facility: CLINIC | Age: 53
End: 2023-03-29
Payer: OTHER MISCELLANEOUS

## 2023-03-29 VITALS
HEIGHT: 59 IN | WEIGHT: 206 LBS | BODY MASS INDEX: 41.53 KG/M2 | HEART RATE: 97 BPM | SYSTOLIC BLOOD PRESSURE: 136 MMHG | DIASTOLIC BLOOD PRESSURE: 74 MMHG

## 2023-03-29 DIAGNOSIS — M12.811 ROTATOR CUFF ARTHROPATHY OF RIGHT SHOULDER: Primary | ICD-10-CM

## 2023-03-29 PROCEDURE — 99214 PR OFFICE/OUTPT VISIT, EST, LEVL IV, 30-39 MIN: ICD-10-PCS | Mod: 25,S$GLB,, | Performed by: ORTHOPAEDIC SURGERY

## 2023-03-29 PROCEDURE — 99999 PR PBB SHADOW E&M-EST. PATIENT-LVL III: ICD-10-PCS | Mod: PBBFAC,,, | Performed by: ORTHOPAEDIC SURGERY

## 2023-03-29 PROCEDURE — 99999 PR PBB SHADOW E&M-EST. PATIENT-LVL III: CPT | Mod: PBBFAC,,, | Performed by: ORTHOPAEDIC SURGERY

## 2023-03-29 PROCEDURE — 20610 LARGE JOINT ASPIRATION/INJECTION: R SUBACROMIAL BURSA: ICD-10-PCS | Mod: RT,S$GLB,, | Performed by: ORTHOPAEDIC SURGERY

## 2023-03-29 PROCEDURE — 20610 DRAIN/INJ JOINT/BURSA W/O US: CPT | Mod: RT,S$GLB,, | Performed by: ORTHOPAEDIC SURGERY

## 2023-03-29 PROCEDURE — 99214 OFFICE O/P EST MOD 30 MIN: CPT | Mod: 25,S$GLB,, | Performed by: ORTHOPAEDIC SURGERY

## 2023-03-29 RX ADMIN — TRIAMCINOLONE ACETONIDE 80 MG: 40 INJECTION, SUSPENSION INTRA-ARTICULAR; INTRAMUSCULAR at 02:03
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
## 2023-04-13 RX ORDER — TRIAMCINOLONE ACETONIDE 40 MG/ML
80 INJECTION, SUSPENSION INTRA-ARTICULAR; INTRAMUSCULAR
Status: DISCONTINUED | OUTPATIENT
Start: 2023-03-29 | End: 2023-04-13 | Stop reason: HOSPADM

## 2023-04-13 NOTE — PROCEDURES
Large Joint Aspiration/Injection: R subacromial bursa    Date/Time: 3/29/2023 2:45 PM  Performed by: Yessi Hahn MD  Authorized by: Yessi Hahn MD     Consent Done?:  Yes (Verbal)  Indications:  Pain  Site marked: the procedure site was marked    Timeout: prior to procedure the correct patient, procedure, and site was verified    Prep: patient was prepped and draped in usual sterile fashion      Details:  Needle Size:  22 G  Approach:  Posterior  Location:  Shoulder  Site:  R subacromial bursa  Medications:  80 mg triamcinolone acetonide 40 mg/mL  Patient tolerance:  Patient tolerated the procedure well with no immediate complications

## 2023-05-17 ENCOUNTER — CLINICAL SUPPORT (OUTPATIENT)
Dept: REHABILITATION | Facility: HOSPITAL | Age: 53
End: 2023-05-17
Attending: ORTHOPAEDIC SURGERY
Payer: OTHER MISCELLANEOUS

## 2023-05-17 DIAGNOSIS — G89.29 CHRONIC RIGHT SHOULDER PAIN: ICD-10-CM

## 2023-05-17 DIAGNOSIS — M25.611 DECREASED RIGHT SHOULDER RANGE OF MOTION: Primary | ICD-10-CM

## 2023-05-17 DIAGNOSIS — M12.811 ROTATOR CUFF ARTHROPATHY OF RIGHT SHOULDER: ICD-10-CM

## 2023-05-17 DIAGNOSIS — M25.511 CHRONIC RIGHT SHOULDER PAIN: ICD-10-CM

## 2023-05-17 DIAGNOSIS — R29.3 POOR POSTURE: ICD-10-CM

## 2023-05-17 DIAGNOSIS — M62.81 MUSCLE WEAKNESS OF UPPER EXTREMITY: ICD-10-CM

## 2023-05-17 PROCEDURE — 97110 THERAPEUTIC EXERCISES: CPT | Mod: PN

## 2023-05-17 PROCEDURE — 97161 PT EVAL LOW COMPLEX 20 MIN: CPT | Mod: PN
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
## 2023-05-17 NOTE — PROGRESS NOTES
Refer to treatment tab for complete initial evaluation    Name: Holly Julian  [facility] Number: [id number]    Therapy Diagnosis:   Encounter Diagnoses   Name Primary?    Rotator cuff arthropathy of right shoulder     Chronic right shoulder pain     Poor posture     Decreased right shoulder range of motion Yes    Muscle weakness of upper extremity      Physician: José Miguel Church MD    Physician Orders: PT Eval and Treat   Medical Diagnosis from Referral:   Diagnosis   M12.811 (ICD-10-CM) - Rotator cuff arthropathy of right shoulder     Evaluation Date: 5/17/2023  Authorization Period Expiration: 10/4/2023  Plan of Care Expiration: 7/12/2023   Progress Note Due: 6/16/2023  Visit # / Visits authorized: 1/ 1    Eval Visit FOTO- 35 (5/17/2023)   5th Visit FOTO   -  (Date/Score/Turn Green)   10th Visit FOTO  -  (Date/Score/Turn Green)   D/C FOTO          -  (Date/Score/Turn Green)     Time In: 10:15 AM  Time Out: 11:00 AM  Total Appointment Time (timed & untimed codes): 45 minutes    Precautions: Standard

## 2023-06-06 ENCOUNTER — CLINICAL SUPPORT (OUTPATIENT)
Dept: REHABILITATION | Facility: HOSPITAL | Age: 53
End: 2023-06-06
Payer: OTHER MISCELLANEOUS

## 2023-06-06 DIAGNOSIS — M62.81 MUSCLE WEAKNESS OF UPPER EXTREMITY: ICD-10-CM

## 2023-06-06 DIAGNOSIS — R29.3 POOR POSTURE: ICD-10-CM

## 2023-06-06 DIAGNOSIS — M25.611 DECREASED RIGHT SHOULDER RANGE OF MOTION: ICD-10-CM

## 2023-06-06 DIAGNOSIS — M25.511 CHRONIC RIGHT SHOULDER PAIN: Primary | ICD-10-CM

## 2023-06-06 DIAGNOSIS — G89.29 CHRONIC RIGHT SHOULDER PAIN: Primary | ICD-10-CM

## 2023-06-06 PROCEDURE — 97112 NEUROMUSCULAR REEDUCATION: CPT | Mod: PN,CQ

## 2023-06-06 PROCEDURE — 97110 THERAPEUTIC EXERCISES: CPT | Mod: PN,CQ

## 2023-06-06 NOTE — PROGRESS NOTES
"[facility]   Physical Therapy Treatment Note        Name: Holly HEREDIA [facility] Number: [id number]    Therapy Diagnosis:   Encounter Diagnoses   Name Primary?    Chronic right shoulder pain Yes    Muscle weakness of upper extremity     Decreased right shoulder range of motion     Poor posture      Physician: José Miguel Church MD    Visit Date: 6/6/2023    Physician Orders: PT Eval and Treat   Medical Diagnosis from Referral:   Diagnosis   M12.811 (ICD-10-CM) - Rotator cuff arthropathy of right shoulder      Evaluation Date: 5/17/2023  Authorization Period Expiration: 10/4/2023  Plan of Care Expiration: 7/12/2023   Progress Note Due: 6/16/2023  Visit # / Visits authorized: 1/ 20  Pta visit #: 1/6     Eval Visit FOTO- 35 (5/17/2023)   5th Visit FOTO   -  (Date/Score/Turn Green)   10th Visit FOTO  -  (Date/Score/Turn Green)   D/C FOTO          -  (Date/Score/Turn Green)       Time In: 3:20  Time Out: 4:00  Billable Time: 40 minutes    Precautions: Standard  Insurance: Payor: [employer] / Plan: [employer] EMPLOYEE / Product Type: Worker's Comp /     Subjective     Pt reports: "Shoulder is feeling horrible, feels like it's getting worse.  She was not compliant with home exercise program.  Response to previous treatment: 1st session   Functional change: 1st session    Pain: 5/10  Location: right shoulder      Objective     Objective Measures updated at progress report unless specified.     Treatment      Holly received the treatments listed below (Bold exercise performed during 6/6/2023 visit):      therapeutic exercises to develop strength, endurance, ROM, and flexibility for 20 minutes including:    Warm-up      Supine        Seated    Wrist Ext #2 2x10  Wrist Flexion #2 2x10  Scapular squeezes 2x10    Standing    Wall slides L hand on top R 2x10      neuromuscular re-education activities to improve: Coordination, Kinesthetic, and Proprioception for 25 minutes. The following " activities were included:  Activities performed   (duration/resistance)     Shoulder isometrics flexion/extension/abduction/adduction 3x10 2-3'' hold Done   Prone Is NP   Prone Ts at tolerable angle NP               Home Exercises Provided and Patient Education Provided     Education provided:   - Continue with HEP    Written Home Exercises Provided: yes. Exercises were reviewed and Holly was able to demonstrate them prior to the end of the session.  Holly demonstrated good  understanding of the education provided. See EMR under Patient Instructions for exercises provided during therapy sessions    Assessment     Pt tolerated session fairly well. Isometric ER and flexion most challenging due to pain. Scapular retractions challenging due to poor neuromuscular control. Scapular retraction did not improve with tactile cues. Wrist exercises added to maintain wrist musculature and prevent atrophy. Holly had no adverse effects with exercises today and will continue to benefit from skilled therapy.     Holly Is progressing well towards her goals.   Pt prognosis is Good.     Pt will continue to benefit from skilled outpatient physical therapy to address the deficits listed in the problem list box on initial evaluation, provide pt/family education and to maximize pt's level of independence in the home and community environment.     Pt's spiritual, cultural and educational needs considered and pt agreeable to plan of care and goals.    Anticipated barriers to physical therapy: Previous physical therapy with minimal change in symptoms       Goals:  Short Term Goals: 4 weeks  6/14/2023        Goal   Status   Pt. to report decreased right shoulder pain </= 4/10 at worst to increase tolerance for performing overhead reaching   Progressing 6/6/2023     Pt. to demonstrate proper cervical and scapula retraction requiring minimum verbal cues from PT to perform Progressing 6/6/2023      Pt. to demonstrate an increase in right  shoulder elevation AROM to 120 deg. to promote greater ease with lifting tasks.  Progressing 6/6/2023      Pt. to demonstrate an increase in right shoulder external AROM to 70 deg. to promote greater ease self care skills  Progressing 6/6/2023      Pt. to demonstrate increased MMT for right shoulder musculature in all directions to 4-/5 to improve ADL tolerance   Progressing 6/6/2023      Pt to tolerate HEP to improve ROM and independence with ADL's  Progressing 6/6/2023            Long Term Goals: 8 weeks  7/12/2023     Goal Status    Pt. to report decreased right shoulder pain of 0/10 at worst to increase tolerance for performing overhead reaching      Pt. to demonstrate proper cervical and scapula retraction requiring minimum verbal cues from PT to perform     Pt. to demonstrate an increase in right shoulder elevation AROM to 140 deg. to promote greater ease with lifting tasks.      Pt. to demonstrate an increase in right shoulder external AROM to 80 deg. to promote greater ease self care skills      Pt. to demonstrate increased MMT for right shoulder musculature in all directions to 4+/5 to improve ADL tolerance       Pt to tolerate HEP to improve ROM and independence with ADL's         Plan     Continued with current ZAY Bella, PTA   6/6/2023

## 2023-06-08 ENCOUNTER — CLINICAL SUPPORT (OUTPATIENT)
Dept: REHABILITATION | Facility: HOSPITAL | Age: 53
End: 2023-06-08
Payer: OTHER MISCELLANEOUS

## 2023-06-08 DIAGNOSIS — G89.29 CHRONIC RIGHT SHOULDER PAIN: Primary | ICD-10-CM

## 2023-06-08 DIAGNOSIS — R29.3 POOR POSTURE: ICD-10-CM

## 2023-06-08 DIAGNOSIS — M25.611 DECREASED RIGHT SHOULDER RANGE OF MOTION: ICD-10-CM

## 2023-06-08 DIAGNOSIS — M25.511 CHRONIC RIGHT SHOULDER PAIN: Primary | ICD-10-CM

## 2023-06-08 DIAGNOSIS — M62.81 MUSCLE WEAKNESS OF UPPER EXTREMITY: ICD-10-CM

## 2023-06-08 PROCEDURE — 97110 THERAPEUTIC EXERCISES: CPT | Mod: PN

## 2023-06-08 PROCEDURE — 97112 NEUROMUSCULAR REEDUCATION: CPT | Mod: PN

## 2023-06-08 NOTE — PROGRESS NOTES
[facility]   Physical Therapy Treatment Note        Name: Holly HEREDIA [facility] Number: [id number]    Therapy Diagnosis:   Encounter Diagnoses   Name Primary?    Chronic right shoulder pain Yes    Muscle weakness of upper extremity     Decreased right shoulder range of motion     Poor posture      Physician: José Miguel Church MD    Visit Date: 6/8/2023    Physician Orders: PT Eval and Treat   Medical Diagnosis from Referral:   Diagnosis   M12.811 (ICD-10-CM) - Rotator cuff arthropathy of right shoulder      Evaluation Date: 5/17/2023  Authorization Period Expiration: 10/4/2023  Plan of Care Expiration: 7/12/2023   Progress Note Due: 6/16/2023  Visit # / Visits authorized: 2/ 20  Pta visit #: 0/6     Eval Visit FOTO- 35 (5/17/2023)   5th Visit FOTO   -  (Date/Score/Turn Green)   10th Visit FOTO  -  (Date/Score/Turn Green)   D/C FOTO          -  (Date/Score/Turn Green)       Time In: 3:15 PM  Time Out: 4:00 PM  Billable Time: 45 minutes (1TE, 2NR)    Precautions: Standard  Insurance: Payor: /     Subjective     Pt reports: continued pain in R shoulder. Patient is frustrated with the amount of pain and dysfunction in shoulder which has limited her in performing work related activities.  She was not compliant with home exercise program.  Response to previous treatment:   Functional change: Ongoing    Pain: 5/10  Location: right shoulder      Objective     Objective Measures updated at progress report unless specified.     Treatment      Holly received the treatments listed below (Bold exercise performed during 6/8/2023 visit):      therapeutic exercises to develop strength, endurance, ROM, and flexibility for 08 minutes including:    Warm-up      Supine        Seated      Scapular squeezes 2x10  Table slides in flexion 2x10    Standing    Wall slides L hand on top R 2x10      neuromuscular re-education activities to improve: Coordination, Kinesthetic, and Proprioception for 37 minutes. The  "following activities were included:  Activities performed   (duration/resistance)     Shoulder isometrics flexion/extension/abduction/adduction 3x10 2-3'' hold NP   Prone Is Done   Prone Ts at tolerable angle Done   Work ergonomics AAROM w/dowel valentin for R shoulder mobility Done   Contract-Relax in sitting for bilateral UEs           Done       Home Exercises Provided and Patient Education Provided     Education provided:   - Continue with HEP    Written Home Exercises Provided: yes. Exercises were reviewed and Holly was able to demonstrate them prior to the end of the session.  Holly demonstrated good  understanding of the education provided. See EMR under Patient Instructions for exercises provided during therapy sessions    Assessment     Pt tolerated session well. Continued pain is noted with functional activities predominantly with driving at work. Patient mentions that she cannot stop working and feels that she will not heal until she stops working. Patient was educated on importance of efficient workplace ergonomics. Patient voiced understanding in postural correctives and understood her updated HEP "plan" for next visit. Continue with current POC.    Holly Is progressing well towards her goals.   Pt prognosis is Good.     Pt will continue to benefit from skilled outpatient physical therapy to address the deficits listed in the problem list box on initial evaluation, provide pt/family education and to maximize pt's level of independence in the home and community environment.     Pt's spiritual, cultural and educational needs considered and pt agreeable to plan of care and goals.    Anticipated barriers to physical therapy: Previous physical therapy with minimal change in symptoms       Goals:  Short Term Goals: 4 weeks  6/14/2023        Goal   Status   Pt. to report decreased right shoulder pain </= 4/10 at worst to increase tolerance for performing overhead reaching   Progressing 6/8/2023     Pt. to " demonstrate proper cervical and scapula retraction requiring minimum verbal cues from PT to perform Progressing 6/8/2023      Pt. to demonstrate an increase in right shoulder elevation AROM to 120 deg. to promote greater ease with lifting tasks.  Progressing 6/8/2023      Pt. to demonstrate an increase in right shoulder external AROM to 70 deg. to promote greater ease self care skills  Progressing 6/8/2023      Pt. to demonstrate increased MMT for right shoulder musculature in all directions to 4-/5 to improve ADL tolerance   Progressing 6/8/2023      Pt to tolerate HEP to improve ROM and independence with ADL's  Progressing 6/8/2023            Long Term Goals: 8 weeks  7/12/2023     Goal Status    Pt. to report decreased right shoulder pain of 0/10 at worst to increase tolerance for performing overhead reaching      Pt. to demonstrate proper cervical and scapula retraction requiring minimum verbal cues from PT to perform     Pt. to demonstrate an increase in right shoulder elevation AROM to 140 deg. to promote greater ease with lifting tasks.      Pt. to demonstrate an increase in right shoulder external AROM to 80 deg. to promote greater ease self care skills      Pt. to demonstrate increased MMT for right shoulder musculature in all directions to 4+/5 to improve ADL tolerance       Pt to tolerate HEP to improve ROM and independence with ADL's         Plan     Continued with current POC      Omid Barbosa, PT   6/8/2023

## 2023-06-12 ENCOUNTER — OFFICE VISIT (OUTPATIENT)
Dept: SPORTS MEDICINE | Facility: CLINIC | Age: 53
End: 2023-06-12
Payer: OTHER MISCELLANEOUS

## 2023-06-12 VITALS — WEIGHT: 206 LBS | BODY MASS INDEX: 41.53 KG/M2 | HEIGHT: 59 IN

## 2023-06-12 DIAGNOSIS — G89.29 CHRONIC RIGHT SHOULDER PAIN: Primary | ICD-10-CM

## 2023-06-12 DIAGNOSIS — M25.511 CHRONIC RIGHT SHOULDER PAIN: Primary | ICD-10-CM

## 2023-06-12 PROCEDURE — 99212 OFFICE O/P EST SF 10 MIN: CPT | Mod: PBBFAC | Performed by: ORTHOPAEDIC SURGERY

## 2023-06-12 PROCEDURE — 99999 PR PBB SHADOW E&M-EST. PATIENT-LVL II: ICD-10-PCS | Mod: PBBFAC,,, | Performed by: ORTHOPAEDIC SURGERY

## 2023-06-12 PROCEDURE — 99214 OFFICE O/P EST MOD 30 MIN: CPT | Mod: S$PBB,,, | Performed by: ORTHOPAEDIC SURGERY

## 2023-06-12 PROCEDURE — 99999 PR PBB SHADOW E&M-EST. PATIENT-LVL II: CPT | Mod: PBBFAC,,, | Performed by: ORTHOPAEDIC SURGERY

## 2023-06-12 PROCEDURE — 99214 PR OFFICE/OUTPT VISIT, EST, LEVL IV, 30-39 MIN: ICD-10-PCS | Mod: S$PBB,,, | Performed by: ORTHOPAEDIC SURGERY
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
## 2023-06-13 ENCOUNTER — CLINICAL SUPPORT (OUTPATIENT)
Dept: REHABILITATION | Facility: HOSPITAL | Age: 53
End: 2023-06-13
Payer: OTHER MISCELLANEOUS

## 2023-06-13 DIAGNOSIS — R29.3 POOR POSTURE: ICD-10-CM

## 2023-06-13 DIAGNOSIS — M25.611 DECREASED RIGHT SHOULDER RANGE OF MOTION: ICD-10-CM

## 2023-06-13 DIAGNOSIS — G89.29 CHRONIC RIGHT SHOULDER PAIN: Primary | ICD-10-CM

## 2023-06-13 DIAGNOSIS — M25.511 CHRONIC RIGHT SHOULDER PAIN: Primary | ICD-10-CM

## 2023-06-13 DIAGNOSIS — M62.81 MUSCLE WEAKNESS OF UPPER EXTREMITY: ICD-10-CM

## 2023-06-13 PROCEDURE — 97112 NEUROMUSCULAR REEDUCATION: CPT | Mod: PN,CQ

## 2023-06-13 PROCEDURE — 97110 THERAPEUTIC EXERCISES: CPT | Mod: PN,CQ

## 2023-06-13 NOTE — PROGRESS NOTES
[facility]   Physical Therapy Treatment Note        Name: Holly HEREDIA [facility] Number: [id number]    Therapy Diagnosis:   Encounter Diagnoses   Name Primary?    Chronic right shoulder pain Yes    Muscle weakness of upper extremity     Decreased right shoulder range of motion     Poor posture        Physician: José Miguel Church MD    Visit Date: 6/13/2023    Physician Orders: PT Eval and Treat   Medical Diagnosis from Referral:   Diagnosis   M12.811 (ICD-10-CM) - Rotator cuff arthropathy of right shoulder      Evaluation Date: 5/17/2023  Authorization Period Expiration: 10/4/2023  Plan of Care Expiration: 7/12/2023   Progress Note Due: 6/16/2023  Visit # / Visits authorized: 3/ 20  Pta visit #: 1/6     Eval Visit FOTO- 35 (5/17/2023)   5th Visit FOTO   -  (Date/Score/Turn Green)   10th Visit FOTO  -  (Date/Score/Turn Green)   D/C FOTO          -  (Date/Score/Turn Green)       Time In: 3:30 PM (pt presented late)  Time Out: 4:00 PM  Billable Time: 30 minutes     Precautions: Standard  Insurance: Payor: [employer] / Plan: [employer] EMPLOYEE / Product Type: Worker's Comp /     Subjective     Pt reports: Shoulder still hurting  She was compliant with home exercise program.  Response to previous treatment:   Functional change: Ongoing    Pain: 5/10  Location: right shoulder      Objective     Objective Measures updated at progress report unless specified.     Treatment      Holly received the treatments listed below (Bold exercise performed during 6/13/2023 visit):      therapeutic exercises to develop strength, endurance, ROM, and flexibility for 010 minutes including:    Warm-up      Supine        Seated      Scapular squeezes 3x10  Table slides in flexion 3x10    Standing    Wall slides L hand on top R 2x10  +Rows RTB   +RTB Shoulder ext 3x10        neuromuscular re-education activities to improve: Coordination, Kinesthetic, and Proprioception for 20 minutes. The following  activities were included:  Activities performed   (duration/resistance)     Shoulder isometrics flexion/extension/abduction/adduction 3x10 2-3'' hold Done   Prone Is (pt reported these as painful) Done   Prone Ts at tolerable angle Done   Work ergonomics AAROM w/dowel valentin for R shoulder mobility NP   Contract-Relax in sitting for bilateral UEs           NP       Home Exercises Provided and Patient Education Provided     Education provided:   - Continue with HEP    Written Home Exercises Provided: yes. Exercises were reviewed and Holly was able to demonstrate them prior to the end of the session.  Holly demonstrated good  understanding of the education provided. See EMR under Patient Instructions for exercises provided during therapy sessions    Assessment     Pt tolerated session fairly well. Pain felt with most exercises but pt completed all reps. Prone Ts also challenging due to scapular retractor weakness. Holly had no adverse effect with exercises and will continue to benefit from skilled therapy.     Holly Is progressing well towards her goals.   Pt prognosis is Good.     Pt will continue to benefit from skilled outpatient physical therapy to address the deficits listed in the problem list box on initial evaluation, provide pt/family education and to maximize pt's level of independence in the home and community environment.     Pt's spiritual, cultural and educational needs considered and pt agreeable to plan of care and goals.    Anticipated barriers to physical therapy: Previous physical therapy with minimal change in symptoms       Goals:  Short Term Goals: 4 weeks  6/14/2023        Goal   Status   Pt. to report decreased right shoulder pain </= 4/10 at worst to increase tolerance for performing overhead reaching   Progressing 6/13/2023     Pt. to demonstrate proper cervical and scapula retraction requiring minimum verbal cues from PT to perform Progressing 6/13/2023      Pt. to demonstrate an increase  in right shoulder elevation AROM to 120 deg. to promote greater ease with lifting tasks.  Progressing 6/13/2023      Pt. to demonstrate an increase in right shoulder external AROM to 70 deg. to promote greater ease self care skills  Progressing 6/13/2023      Pt. to demonstrate increased MMT for right shoulder musculature in all directions to 4-/5 to improve ADL tolerance   Progressing 6/13/2023      Pt to tolerate HEP to improve ROM and independence with ADL's  Progressing 6/13/2023            Long Term Goals: 8 weeks  7/12/2023     Goal Status    Pt. to report decreased right shoulder pain of 0/10 at worst to increase tolerance for performing overhead reaching      Pt. to demonstrate proper cervical and scapula retraction requiring minimum verbal cues from PT to perform     Pt. to demonstrate an increase in right shoulder elevation AROM to 140 deg. to promote greater ease with lifting tasks.      Pt. to demonstrate an increase in right shoulder external AROM to 80 deg. to promote greater ease self care skills      Pt. to demonstrate increased MMT for right shoulder musculature in all directions to 4+/5 to improve ADL tolerance       Pt to tolerate HEP to improve ROM and independence with ADL's         Plan     Continued with current ZAY Bella, PTA   6/13/2023

## 2023-06-20 ENCOUNTER — DOCUMENTATION ONLY (OUTPATIENT)
Dept: REHABILITATION | Facility: HOSPITAL | Age: 53
End: 2023-06-20
Payer: OTHER MISCELLANEOUS

## 2023-06-20 NOTE — PROGRESS NOTES
Attempted to send a missed appointment notification to pt but they do not have and active myChart. This is the 2nd consecutive no show.

## 2023-07-24 ENCOUNTER — OFFICE VISIT (OUTPATIENT)
Dept: SPORTS MEDICINE | Facility: CLINIC | Age: 53
End: 2023-07-24
Payer: OTHER MISCELLANEOUS

## 2023-07-24 DIAGNOSIS — G89.29 CHRONIC RIGHT SHOULDER PAIN: Primary | ICD-10-CM

## 2023-07-24 DIAGNOSIS — M25.511 CHRONIC RIGHT SHOULDER PAIN: Primary | ICD-10-CM

## 2023-07-24 PROCEDURE — 99214 PR OFFICE/OUTPT VISIT, EST, LEVL IV, 30-39 MIN: ICD-10-PCS | Mod: S$PBB,,, | Performed by: ORTHOPAEDIC SURGERY

## 2023-07-24 PROCEDURE — 99212 OFFICE O/P EST SF 10 MIN: CPT | Mod: PBBFAC | Performed by: ORTHOPAEDIC SURGERY

## 2023-07-24 PROCEDURE — 99999 PR PBB SHADOW E&M-EST. PATIENT-LVL II: ICD-10-PCS | Mod: PBBFAC,,, | Performed by: ORTHOPAEDIC SURGERY

## 2023-07-24 PROCEDURE — 99999 PR PBB SHADOW E&M-EST. PATIENT-LVL II: CPT | Mod: PBBFAC,,, | Performed by: ORTHOPAEDIC SURGERY

## 2023-07-24 PROCEDURE — 99214 OFFICE O/P EST MOD 30 MIN: CPT | Mod: S$PBB,,, | Performed by: ORTHOPAEDIC SURGERY

## 2023-07-24 RX ORDER — ACETAMINOPHEN 325 MG/1
325 TABLET ORAL EVERY 6 HOURS PRN
COMMUNITY
End: 2023-08-18

## 2023-07-24 RX ORDER — DIAZEPAM 5 MG/1
5 TABLET ORAL ONCE
Qty: 2 TABLET | Refills: 0 | Status: SHIPPED | OUTPATIENT
Start: 2023-07-24 | End: 2023-08-18
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
## 2023-07-28 ENCOUNTER — HOSPITAL ENCOUNTER (OUTPATIENT)
Dept: RADIOLOGY | Facility: HOSPITAL | Age: 53
Discharge: HOME | End: 2023-07-28
Attending: STUDENT IN AN ORGANIZED HEALTH CARE EDUCATION/TRAINING PROGRAM
Payer: OTHER MISCELLANEOUS

## 2023-07-28 DIAGNOSIS — G89.29 CHRONIC RIGHT SHOULDER PAIN: ICD-10-CM

## 2023-07-28 DIAGNOSIS — M25.511 CHRONIC RIGHT SHOULDER PAIN: ICD-10-CM

## 2023-07-28 PROCEDURE — 73221 MRI JOINT UPR EXTREM W/O DYE: CPT | Mod: 26,RT,, | Performed by: RADIOLOGY

## 2023-07-28 PROCEDURE — 73221 MRI SHOULDER WITHOUT CONTRAST RIGHT: ICD-10-PCS | Mod: 26,RT,, | Performed by: RADIOLOGY

## 2023-07-28 PROCEDURE — 73221 MRI JOINT UPR EXTREM W/O DYE: CPT | Mod: TC,RT

## 2023-08-07 ENCOUNTER — TELEPHONE (OUTPATIENT)
Dept: SPORTS MEDICINE | Facility: CLINIC | Age: 53
End: 2023-08-07
Payer: OTHER MISCELLANEOUS

## 2023-08-07 NOTE — TELEPHONE ENCOUNTER
I called the patient and let her know that Dr. Hahn is still out of the office and she should expect a call with her results tomorrow

## 2023-08-07 NOTE — TELEPHONE ENCOUNTER
----- Message from [facility] sent at 8/7/2023 10:17 AM CDT -----  Type:  Needs Medical Advice    Who Called: Pt   Would the patient rather a call back or a response via [facility]? callback  Best Call Back Number: [phone]  Additional Information: Pt requesting callback from office to discuss getting Mri results

## 2023-08-08 ENCOUNTER — TELEPHONE (OUTPATIENT)
Dept: SPORTS MEDICINE | Facility: CLINIC | Age: 53
End: 2023-08-08
Payer: OTHER MISCELLANEOUS

## 2023-08-08 NOTE — TELEPHONE ENCOUNTER
Dr. Hahn called the patient to discuss her MRI results, they are as follows:     MRI Right shoulder reviewed and interpreted personally by me: High grade near full thickness supraspinatus tear without atrophy or retraction. SLAP, biceps tendinitis, + insufficiency fracture greater tuberosity, AC moderate hypertrophy, degenerative changes    ASSESSMENT:    Right Shoulder Rotator Cuff Tear, SLAP, biceps tendonitis.      she would benefit from an arthroscopy, given the above.       PLAN:   Right Shoulder rotator cuff tear     All questions were answered, pt will contact us for questions or concerns in the interim.  Had thorough discussion of non-operative vs operative intervention, and risks and benefits of both.     We have discussed the surgery and recovery of arthroscopic shoulder surgery. she understands that there may be limited mobility up to several weeks after surgery depending on procedures that are performed at the time of surgery.    The spectrum of treatment options were discussed with the patient, including nonoperative and operative options.  After thorough discussion, the patient has elected to undergo surgical treatment to include:    right   a. Shoulder arthroscopic rotator cuff repair versus debridement with Cuffmend   b. Shoulder arthroscopic SAD   c. Shoulder arthroscopic extensive debridement   d. Shoulder arthroscopic biceps tenodesis    e. Shoulder arthroscopic possible microfracture   f.  Shoulder arthroscopic DCE   g. Shoulder open reduction internal fixation greater tuberosity    The details of the surgical procedure were explained, including the location of probable incisions and a description of likely hardware and/or grafts to be used.  The patient understands the likely convalescence after surgery.  Also, we have thoroughly discussed the risks, benefits and alternatives to surgery, including, but not limited to, the risk of infection, joint stiffness, blood clot (including DVT and/or  pulmonary embolus), neurologic and vascular injury.  It was explained that, if tissue has been repaired or reconstructed, there is a chance of failure, which may require further management.  Consent form for surgery is signed and in chart.      Will proceed with surgery on 10/3/23

## 2023-08-14 DIAGNOSIS — M75.101 NONTRAUMATIC ROTATOR CUFF TEAR, RIGHT: Primary | ICD-10-CM

## 2023-08-14 DIAGNOSIS — S42.254A CLOSED NONDISPLACED FRACTURE OF GREATER TUBEROSITY OF RIGHT HUMERUS, INITIAL ENCOUNTER: ICD-10-CM

## 2023-08-14 DIAGNOSIS — M19.011 ARTHRITIS OF RIGHT ACROMIOCLAVICULAR JOINT: ICD-10-CM

## 2023-08-14 DIAGNOSIS — S43.431A SLAP LESION OF RIGHT SHOULDER: ICD-10-CM

## 2023-08-14 DIAGNOSIS — M75.21 BICEPS TENDONITIS ON RIGHT: ICD-10-CM

## 2023-08-18 ENCOUNTER — OFFICE VISIT (OUTPATIENT)
Dept: PRIMARY CARE CLINIC | Facility: CLINIC | Age: 53
End: 2023-08-18
Payer: COMMERCIAL

## 2023-08-18 VITALS
DIASTOLIC BLOOD PRESSURE: 90 MMHG | TEMPERATURE: 98 F | RESPIRATION RATE: 18 BRPM | HEART RATE: 91 BPM | WEIGHT: 206.44 LBS | OXYGEN SATURATION: 100 % | HEIGHT: 59 IN | SYSTOLIC BLOOD PRESSURE: 148 MMHG | BODY MASS INDEX: 41.62 KG/M2

## 2023-08-18 DIAGNOSIS — Z01.818 PREOP EXAMINATION: ICD-10-CM

## 2023-08-18 DIAGNOSIS — I10 BENIGN ESSENTIAL HTN: ICD-10-CM

## 2023-08-18 DIAGNOSIS — Z13.1 DIABETES MELLITUS SCREENING: ICD-10-CM

## 2023-08-18 DIAGNOSIS — Z12.31 BREAST CANCER SCREENING BY MAMMOGRAM: ICD-10-CM

## 2023-08-18 DIAGNOSIS — Z11.59 NEED FOR HEPATITIS C SCREENING TEST: ICD-10-CM

## 2023-08-18 DIAGNOSIS — Z76.89 ENCOUNTER TO ESTABLISH CARE: Primary | ICD-10-CM

## 2023-08-18 DIAGNOSIS — Z12.11 COLON CANCER SCREENING: ICD-10-CM

## 2023-08-18 PROCEDURE — 99999 PR PBB SHADOW E&M-EST. PATIENT-LVL III: CPT | Mod: PBBFAC,,, | Performed by: STUDENT IN AN ORGANIZED HEALTH CARE EDUCATION/TRAINING PROGRAM

## 2023-08-18 PROCEDURE — 1160F RVW MEDS BY RX/DR IN RCRD: CPT | Mod: CPTII,S$GLB,, | Performed by: STUDENT IN AN ORGANIZED HEALTH CARE EDUCATION/TRAINING PROGRAM

## 2023-08-18 PROCEDURE — 93010 EKG 12-LEAD: ICD-10-PCS | Mod: S$GLB,,, | Performed by: INTERNAL MEDICINE

## 2023-08-18 PROCEDURE — 3080F PR MOST RECENT DIASTOLIC BLOOD PRESSURE >= 90 MM HG: ICD-10-PCS | Mod: CPTII,S$GLB,, | Performed by: STUDENT IN AN ORGANIZED HEALTH CARE EDUCATION/TRAINING PROGRAM

## 2023-08-18 PROCEDURE — 3077F SYST BP >= 140 MM HG: CPT | Mod: CPTII,S$GLB,, | Performed by: STUDENT IN AN ORGANIZED HEALTH CARE EDUCATION/TRAINING PROGRAM

## 2023-08-18 PROCEDURE — 3080F DIAST BP >= 90 MM HG: CPT | Mod: CPTII,S$GLB,, | Performed by: STUDENT IN AN ORGANIZED HEALTH CARE EDUCATION/TRAINING PROGRAM

## 2023-08-18 PROCEDURE — 1160F PR REVIEW ALL MEDS BY PRESCRIBER/CLIN PHARMACIST DOCUMENTED: ICD-10-PCS | Mod: CPTII,S$GLB,, | Performed by: STUDENT IN AN ORGANIZED HEALTH CARE EDUCATION/TRAINING PROGRAM

## 2023-08-18 PROCEDURE — 93005 ELECTROCARDIOGRAM TRACING: CPT | Mod: S$GLB,,, | Performed by: STUDENT IN AN ORGANIZED HEALTH CARE EDUCATION/TRAINING PROGRAM

## 2023-08-18 PROCEDURE — 93010 ELECTROCARDIOGRAM REPORT: CPT | Mod: S$GLB,,, | Performed by: INTERNAL MEDICINE

## 2023-08-18 PROCEDURE — 3008F PR BODY MASS INDEX (BMI) DOCUMENTED: ICD-10-PCS | Mod: CPTII,S$GLB,, | Performed by: STUDENT IN AN ORGANIZED HEALTH CARE EDUCATION/TRAINING PROGRAM

## 2023-08-18 PROCEDURE — 99999 PR PBB SHADOW E&M-EST. PATIENT-LVL III: ICD-10-PCS | Mod: PBBFAC,,, | Performed by: STUDENT IN AN ORGANIZED HEALTH CARE EDUCATION/TRAINING PROGRAM

## 2023-08-18 PROCEDURE — 3077F PR MOST RECENT SYSTOLIC BLOOD PRESSURE >= 140 MM HG: ICD-10-PCS | Mod: CPTII,S$GLB,, | Performed by: STUDENT IN AN ORGANIZED HEALTH CARE EDUCATION/TRAINING PROGRAM

## 2023-08-18 PROCEDURE — 1159F PR MEDICATION LIST DOCUMENTED IN MEDICAL RECORD: ICD-10-PCS | Mod: CPTII,S$GLB,, | Performed by: STUDENT IN AN ORGANIZED HEALTH CARE EDUCATION/TRAINING PROGRAM

## 2023-08-18 PROCEDURE — 99214 OFFICE O/P EST MOD 30 MIN: CPT | Mod: S$GLB,,, | Performed by: STUDENT IN AN ORGANIZED HEALTH CARE EDUCATION/TRAINING PROGRAM

## 2023-08-18 PROCEDURE — 99214 PR OFFICE/OUTPT VISIT, EST, LEVL IV, 30-39 MIN: ICD-10-PCS | Mod: S$GLB,,, | Performed by: STUDENT IN AN ORGANIZED HEALTH CARE EDUCATION/TRAINING PROGRAM

## 2023-08-18 PROCEDURE — 93005 EKG 12-LEAD: ICD-10-PCS | Mod: S$GLB,,, | Performed by: STUDENT IN AN ORGANIZED HEALTH CARE EDUCATION/TRAINING PROGRAM

## 2023-08-18 PROCEDURE — 3008F BODY MASS INDEX DOCD: CPT | Mod: CPTII,S$GLB,, | Performed by: STUDENT IN AN ORGANIZED HEALTH CARE EDUCATION/TRAINING PROGRAM

## 2023-08-18 PROCEDURE — 1159F MED LIST DOCD IN RCRD: CPT | Mod: CPTII,S$GLB,, | Performed by: STUDENT IN AN ORGANIZED HEALTH CARE EDUCATION/TRAINING PROGRAM

## 2023-08-18 NOTE — PROGRESS NOTES
Subjective:       Patient ID: Holly Julian is a 53 y.o. female.    Chief Complaint: Establish Care    HPI:  53 y.o. female presents to [facility] to establish care    Acute concerns?: Needing PCP for surgical clearance for right shoulder rotator cuff repair surgery with Aracelis Hahn on 10/3/2023.    HTN: No history  Home BP log?: 130s systoic typically, 80s diastolic normally  Medications: None  Compliance?: N/A    Diet?: Trying to bring down weight, has been reducing sodium  Exercise?: Walks frequently. Just got a new bike    Any prior reaction to anaesthesia: No  History of prolonged bleeding after surgery or injury?: No  High risk surgery?: No  History of MI, abnormal stress, anginal chest pain, stent, NTG use?: No  History of CHF?: No  History of TIA or stroke?: No  On insulin?: No  Pre-op Cr >2 mg/dL?: No recent labs    Fasting?: No  HIV/Hep C screening?: HIV was negativ ein past with pregnancy  Pap Hx?: Local clinic 2 years ago, wll defer  Mammogram Hx?: Same clinic  Last tetanus vaccine?: Declines today's visit  Colonoscopy?: Amenable to cologuard, denies history of abnormal colonoscopy    Review of Systems   Constitutional:  Negative for chills, diaphoresis, fatigue and fever.   HENT:  Negative for congestion, sinus pressure, sneezing and sore throat.    Respiratory:  Negative for cough and shortness of breath.    Cardiovascular:  Negative for chest pain and palpitations.   Gastrointestinal:  Negative for abdominal pain, diarrhea, nausea and vomiting.   Musculoskeletal:  Positive for arthralgias (right) and joint swelling (Feels swelling in right shoulder). Negative for myalgias.   Skin:  Negative for rash and wound.   Neurological:  Negative for dizziness, light-headedness and headaches.       Objective:      Vitals:    08/18/23 1344   BP: (!) 150/92   BP Location: Left arm   Patient Position: Sitting   BP Method: Medium (Manual)   Pulse: 91   Resp: 18   Temp: 97.7 °F (36.5 °C)   TempSrc: Temporal   SpO2:  "100%   Weight: 93.6 kg (206 lb 7.4 oz)   Height: 4' 11" (1.499 m)     Physical Exam  Vitals reviewed.   Constitutional:       General: She is not in acute distress.     Appearance: Normal appearance. She is not ill-appearing.   HENT:      Head: Normocephalic and atraumatic.      Mouth/Throat:      Mouth: Mucous membranes are moist.      Pharynx: Oropharynx is clear. No oropharyngeal exudate or posterior oropharyngeal erythema.      Comments: Mallampati II  Eyes:      General:         Right eye: No discharge.         Left eye: No discharge.      Conjunctiva/sclera: Conjunctivae normal.   Cardiovascular:      Rate and Rhythm: Normal rate and regular rhythm.      Pulses: Normal pulses.      Heart sounds: No murmur heard.  Pulmonary:      Effort: Pulmonary effort is normal.      Breath sounds: Normal breath sounds.   Musculoskeletal:         General: No deformity.      Cervical back: Neck supple. No rigidity.   Lymphadenopathy:      Cervical: No cervical adenopathy.   Skin:     General: Skin is warm and dry.      Coloration: Skin is not jaundiced.   Neurological:      General: No focal deficit present.      Mental Status: She is alert and oriented to person, place, and time.   Psychiatric:         Mood and Affect: Mood normal.         Behavior: Behavior normal.             No results found for: "NA", "K", "CL", "CO2", "BUN", "CREATININE", "GLUCOSE", "ANIONGAP"  No results found for: "HGBA1C"  No results found for: "BNP", "BNPTRIAGEBLO"    No results found for: "WBC", "HGB", "HCT", "PLT", "GRAN"  No results found for: "CHOL", "HDL", "LDLCALC", "TRIG"     No current outpatient medications on file.        Assessment:       1. Encounter to establish care    2. Preop examination    3. Benign essential HTN    4. Diabetes mellitus screening    5. Need for hepatitis C screening test    6. Breast cancer screening by mammogram    7. Colon cancer screening           Plan:       Encounter to establish care    Preop " examination  - Agree with proceeding with procedure as patient is at no increased risk based off of Revised Cardiac Risk Index (pending renal function studies)  - Patient's risk score is 0.4%, informed patient this is her risk of MI, cardiac arrest, or death within 30 days    Benign essential HTN  -     CBC Auto Differential; Future; Expected date: 08/18/2023  -     Comprehensive Metabolic Panel; Future; Expected date: 08/18/2023  -     Lipid Panel; Future; Expected date: 08/18/2023  -     Cancel: URINALYSIS  -     EKG 12-lead  -     TSH; Future; Expected date: 08/18/2023  -     URINALYSIS; Future; Expected date: 08/18/2023  - Diet and exercise recommendations provided today's visit, will follow-up in 6 months with interventions    Diabetes mellitus screening  -     Hemoglobin A1C; Future; Expected date: 08/18/2023    Need for hepatitis C screening test  -     Hepatitis C Antibody; Future; Expected date: 08/18/2023    Breast cancer screening by mammogram  -     Mammo Digital Screening Bilat w/ Talon; Future; Expected date: 08/18/2023    Colon cancer screening  -     Cologuard Screening (Multitarget Stool DNA); Future; Expected date: 08/18/2023    RTC in 6 months

## 2023-09-01 ENCOUNTER — CLINICAL SUPPORT (OUTPATIENT)
Dept: PRIMARY CARE CLINIC | Facility: CLINIC | Age: 53
End: 2023-09-01
Payer: COMMERCIAL

## 2023-09-01 VITALS — DIASTOLIC BLOOD PRESSURE: 104 MMHG | SYSTOLIC BLOOD PRESSURE: 164 MMHG

## 2023-09-01 DIAGNOSIS — I10 ESSENTIAL HYPERTENSION: Primary | ICD-10-CM

## 2023-09-01 PROCEDURE — 99999 PR PBB SHADOW E&M-EST. PATIENT-LVL II: CPT | Mod: PBBFAC,,,

## 2023-09-01 PROCEDURE — 99999 PR PBB SHADOW E&M-EST. PATIENT-LVL II: ICD-10-PCS | Mod: PBBFAC,,,

## 2023-09-01 RX ORDER — AMLODIPINE BESYLATE 5 MG/1
5 TABLET ORAL DAILY
Qty: 30 TABLET | Refills: 11 | Status: SHIPPED | OUTPATIENT
Start: 2023-09-01 | End: 2023-10-24

## 2023-09-01 NOTE — PROGRESS NOTES
Manually checked patients bp reading 164/104 provider notified . New rx for amlodipine 5 mg sent to pharmacy and patient understood to start bp med and return for a 2 week nurse visit bp check

## 2023-09-25 ENCOUNTER — OFFICE VISIT (OUTPATIENT)
Dept: SPORTS MEDICINE | Facility: CLINIC | Age: 53
End: 2023-09-25
Payer: COMMERCIAL

## 2023-09-25 VITALS
BODY MASS INDEX: 41.53 KG/M2 | WEIGHT: 206 LBS | DIASTOLIC BLOOD PRESSURE: 73 MMHG | SYSTOLIC BLOOD PRESSURE: 124 MMHG | HEIGHT: 59 IN | HEART RATE: 99 BPM

## 2023-09-25 DIAGNOSIS — M75.101 NONTRAUMATIC ROTATOR CUFF TEAR, RIGHT: Primary | ICD-10-CM

## 2023-09-25 PROCEDURE — 99999 PR PBB SHADOW E&M-EST. PATIENT-LVL III: ICD-10-PCS | Mod: PBBFAC,,, | Performed by: PHYSICIAN ASSISTANT

## 2023-09-25 PROCEDURE — 99499 NO LOS: ICD-10-PCS | Mod: S$GLB,,, | Performed by: PHYSICIAN ASSISTANT

## 2023-09-25 PROCEDURE — 99499 UNLISTED E&M SERVICE: CPT | Mod: S$GLB,,, | Performed by: PHYSICIAN ASSISTANT

## 2023-09-25 PROCEDURE — 99999 PR PBB SHADOW E&M-EST. PATIENT-LVL III: CPT | Mod: PBBFAC,,, | Performed by: PHYSICIAN ASSISTANT

## 2023-09-25 RX ORDER — ASPIRIN 81 MG/1
81 TABLET ORAL DAILY
Qty: 28 TABLET | Refills: 0 | Status: SHIPPED | OUTPATIENT
Start: 2023-09-25 | End: 2024-01-18

## 2023-09-25 RX ORDER — SODIUM CHLORIDE 9 MG/ML
INJECTION, SOLUTION INTRAVENOUS CONTINUOUS
Status: CANCELLED | OUTPATIENT
Start: 2023-09-25

## 2023-09-25 RX ORDER — DOCUSATE SODIUM 100 MG/1
100 CAPSULE, LIQUID FILLED ORAL 2 TIMES DAILY PRN
Qty: 20 CAPSULE | Refills: 0 | Status: SHIPPED | OUTPATIENT
Start: 2023-09-25 | End: 2024-01-18

## 2023-09-25 RX ORDER — OXYCODONE HCL 10 MG/1
10 TABLET, FILM COATED, EXTENDED RELEASE ORAL EVERY 12 HOURS
Status: CANCELLED | OUTPATIENT
Start: 2023-09-25

## 2023-09-25 RX ORDER — PROMETHAZINE HYDROCHLORIDE 25 MG/1
25 TABLET ORAL EVERY 6 HOURS PRN
Qty: 8 TABLET | Refills: 0 | Status: SHIPPED | OUTPATIENT
Start: 2023-09-25 | End: 2024-01-18

## 2023-09-25 RX ORDER — OXYCODONE AND ACETAMINOPHEN 10; 325 MG/1; MG/1
TABLET ORAL
Qty: 21 TABLET | Refills: 0 | Status: SHIPPED | OUTPATIENT
Start: 2023-09-25 | End: 2024-01-18

## 2023-09-25 RX ORDER — CEFAZOLIN SODIUM 2 G/50ML
2 SOLUTION INTRAVENOUS
Status: CANCELLED | OUTPATIENT
Start: 2023-09-25

## 2023-09-25 RX ORDER — PREGABALIN 75 MG/1
75 CAPSULE ORAL
Status: CANCELLED | OUTPATIENT
Start: 2023-09-26 | End: 2023-09-26

## 2023-09-25 RX ORDER — CLINDAMYCIN HYDROCHLORIDE 150 MG/1
450 CAPSULE ORAL
Status: CANCELLED | OUTPATIENT
Start: 2023-09-26 | End: 2023-09-26

## 2023-09-25 RX ORDER — TRAMADOL HYDROCHLORIDE 50 MG/1
50-100 TABLET ORAL EVERY 6 HOURS PRN
Qty: 21 TABLET | Refills: 0 | Status: SHIPPED | OUTPATIENT
Start: 2023-09-25 | End: 2024-01-18

## 2023-09-26 ENCOUNTER — OFFICE VISIT (OUTPATIENT)
Dept: PRIMARY CARE CLINIC | Facility: CLINIC | Age: 53
End: 2023-09-26
Payer: COMMERCIAL

## 2023-09-26 VITALS
HEART RATE: 76 BPM | HEIGHT: 59 IN | SYSTOLIC BLOOD PRESSURE: 128 MMHG | OXYGEN SATURATION: 98 % | BODY MASS INDEX: 40.85 KG/M2 | TEMPERATURE: 98 F | RESPIRATION RATE: 18 BRPM | DIASTOLIC BLOOD PRESSURE: 84 MMHG | WEIGHT: 202.63 LBS

## 2023-09-26 DIAGNOSIS — Z01.818 PREOP EXAMINATION: Primary | ICD-10-CM

## 2023-09-26 PROCEDURE — 3008F PR BODY MASS INDEX (BMI) DOCUMENTED: ICD-10-PCS | Mod: CPTII,S$GLB,, | Performed by: STUDENT IN AN ORGANIZED HEALTH CARE EDUCATION/TRAINING PROGRAM

## 2023-09-26 PROCEDURE — 1159F PR MEDICATION LIST DOCUMENTED IN MEDICAL RECORD: ICD-10-PCS | Mod: CPTII,S$GLB,, | Performed by: STUDENT IN AN ORGANIZED HEALTH CARE EDUCATION/TRAINING PROGRAM

## 2023-09-26 PROCEDURE — 99214 OFFICE O/P EST MOD 30 MIN: CPT | Mod: S$GLB,,, | Performed by: STUDENT IN AN ORGANIZED HEALTH CARE EDUCATION/TRAINING PROGRAM

## 2023-09-26 PROCEDURE — 3074F PR MOST RECENT SYSTOLIC BLOOD PRESSURE < 130 MM HG: ICD-10-PCS | Mod: CPTII,S$GLB,, | Performed by: STUDENT IN AN ORGANIZED HEALTH CARE EDUCATION/TRAINING PROGRAM

## 2023-09-26 PROCEDURE — 3074F SYST BP LT 130 MM HG: CPT | Mod: CPTII,S$GLB,, | Performed by: STUDENT IN AN ORGANIZED HEALTH CARE EDUCATION/TRAINING PROGRAM

## 2023-09-26 PROCEDURE — 1160F PR REVIEW ALL MEDS BY PRESCRIBER/CLIN PHARMACIST DOCUMENTED: ICD-10-PCS | Mod: CPTII,S$GLB,, | Performed by: STUDENT IN AN ORGANIZED HEALTH CARE EDUCATION/TRAINING PROGRAM

## 2023-09-26 PROCEDURE — 99214 PR OFFICE/OUTPT VISIT, EST, LEVL IV, 30-39 MIN: ICD-10-PCS | Mod: S$GLB,,, | Performed by: STUDENT IN AN ORGANIZED HEALTH CARE EDUCATION/TRAINING PROGRAM

## 2023-09-26 PROCEDURE — 3079F DIAST BP 80-89 MM HG: CPT | Mod: CPTII,S$GLB,, | Performed by: STUDENT IN AN ORGANIZED HEALTH CARE EDUCATION/TRAINING PROGRAM

## 2023-09-26 PROCEDURE — 1160F RVW MEDS BY RX/DR IN RCRD: CPT | Mod: CPTII,S$GLB,, | Performed by: STUDENT IN AN ORGANIZED HEALTH CARE EDUCATION/TRAINING PROGRAM

## 2023-09-26 PROCEDURE — 99999 PR PBB SHADOW E&M-EST. PATIENT-LVL IV: ICD-10-PCS | Mod: PBBFAC,,, | Performed by: STUDENT IN AN ORGANIZED HEALTH CARE EDUCATION/TRAINING PROGRAM

## 2023-09-26 PROCEDURE — 1159F MED LIST DOCD IN RCRD: CPT | Mod: CPTII,S$GLB,, | Performed by: STUDENT IN AN ORGANIZED HEALTH CARE EDUCATION/TRAINING PROGRAM

## 2023-09-26 PROCEDURE — 99999 PR PBB SHADOW E&M-EST. PATIENT-LVL IV: CPT | Mod: PBBFAC,,, | Performed by: STUDENT IN AN ORGANIZED HEALTH CARE EDUCATION/TRAINING PROGRAM

## 2023-09-26 PROCEDURE — 3079F PR MOST RECENT DIASTOLIC BLOOD PRESSURE 80-89 MM HG: ICD-10-PCS | Mod: CPTII,S$GLB,, | Performed by: STUDENT IN AN ORGANIZED HEALTH CARE EDUCATION/TRAINING PROGRAM

## 2023-09-26 PROCEDURE — 3008F BODY MASS INDEX DOCD: CPT | Mod: CPTII,S$GLB,, | Performed by: STUDENT IN AN ORGANIZED HEALTH CARE EDUCATION/TRAINING PROGRAM

## 2023-09-26 NOTE — PROGRESS NOTES
"Subjective:       Patient ID: Holly Julian is a 53 y.o. female.    Chief Complaint: Pre-op Exam      HPI:  53 y.o. female presents to [facility] here for pre-op exam    Will have rotator cuff repair performed by Dr. Hahn on 10/3/2023 through [facility]    Amlodipine is helping with elevation in blood pressure. Taking at home and has been normal range.    Any prior reaction to anaesthesia?: No  History of prolonged bleeding after surgery or injury?: No  High risk surgery?: No  History of MI, abnormal stress, anginal chest pain, stent, NTG use?: No  History of CHF?: No  History of TIA or stroke?: No  On insulin?: No  Pre-op Cr >2 mg/dL?: No reported history of renal disease      Review of Systems   Constitutional:  Negative for chills, diaphoresis, fatigue and fever.   HENT:  Negative for congestion, sinus pressure, sneezing and sore throat.    Respiratory:  Negative for cough and shortness of breath.    Cardiovascular:  Negative for chest pain and palpitations.   Gastrointestinal:  Negative for abdominal pain, diarrhea, nausea and vomiting.   Musculoskeletal:  Positive for arthralgias (right shoulder). Negative for joint swelling and myalgias.   Skin:  Negative for rash and wound.   Neurological:  Negative for weakness and numbness.       Objective:      Vitals:    09/26/23 0849   BP: 128/84   BP Location: Right arm   Patient Position: Sitting   BP Method: Medium (Manual)   Pulse: 76   Resp: 18   Temp: 97.7 °F (36.5 °C)   TempSrc: Temporal   SpO2: 98%   Weight: 91.9 kg (202 lb 9.6 oz)   Height: 4' 11" (1.499 m)     Physical Exam  Vitals reviewed.   Constitutional:       General: She is not in acute distress.     Appearance: Normal appearance. She is not ill-appearing.   HENT:      Head: Normocephalic and atraumatic.      Mouth/Throat:      Mouth: Mucous membranes are moist.      Pharynx: Oropharynx is clear. No oropharyngeal exudate or posterior oropharyngeal erythema.      Comments: Mallampati II  Eyes:    " "  General:         Right eye: No discharge.         Left eye: No discharge.      Conjunctiva/sclera: Conjunctivae normal.   Cardiovascular:      Rate and Rhythm: Normal rate and regular rhythm.      Pulses: Normal pulses.      Heart sounds: No murmur heard.  Pulmonary:      Effort: Pulmonary effort is normal.      Breath sounds: Normal breath sounds.   Musculoskeletal:         General: No deformity.      Cervical back: Neck supple. No rigidity.   Lymphadenopathy:      Cervical: No cervical adenopathy.   Skin:     General: Skin is warm and dry.      Coloration: Skin is not jaundiced.   Neurological:      General: No focal deficit present.      Mental Status: She is alert and oriented to person, place, and time.   Psychiatric:         Mood and Affect: Mood normal.         Behavior: Behavior normal.             No results found for: "NA", "K", "CL", "CO2", "BUN", "CREATININE", "GLUCOSE", "ANIONGAP"  No results found for: "HGBA1C"  No results found for: "BNP", "BNPTRIAGEBLO"    No results found for: "WBC", "HGB", "HCT", "PLT", "GRAN"  No results found for: "CHOL", "HDL", "LDLCALC", "TRIG"       Current Outpatient Medications:     amLODIPine (NORVASC) 5 MG tablet, Take 1 tablet (5 mg total) by mouth once daily., Disp: 30 tablet, Rfl: 11    aspirin (ECOTRIN) 81 MG EC tablet, Take 1 tablet (81 mg total) by mouth once daily. For 4 weeks starting the day after surgery., Disp: 28 tablet, Rfl: 0    docusate sodium (COLACE) 100 MG capsule, Take 1 capsule (100 mg total) by mouth 2 (two) times daily as needed for Constipation., Disp: 20 capsule, Rfl: 0    oxyCODONE-acetaminophen (PERCOCET)  mg per tablet, Take 1 tablet by mouth every 4-6 hours as needed for pain. Take stool softener with this medication., Disp: 21 tablet, Rfl: 0    promethazine (PHENERGAN) 25 MG tablet, Take 1 tablet (25 mg total) by mouth every 6 (six) hours as needed for Nausea., Disp: 8 tablet, Rfl: 0    traMADoL (ULTRAM) 50 mg tablet, Take 1-2 tablets " ( mg total) by mouth every 6 (six) hours as needed for Pain., Disp: 21 tablet, Rfl: 0        Assessment:       1. Preop examination           Plan:       Preop examination  - Agree with proceeding with procedure as patient is at no increased risk based off of Revised Cardiac Risk Index pending renal function studies  - Patient's risk score is 0.4%, informed patient this is her risk of MI, cardiac arrest, or death within 30 days    RTC PRN

## 2023-09-26 NOTE — H&P (VIEW-ONLY)
Holly Julian  is here for a completion of her perioperative paperwork. she  Is scheduled to undergo     right              a. Shoulder arthroscopic rotator cuff repair versus debridement with Cuffmend              b. Shoulder arthroscopic SAD              c. Shoulder arthroscopic extensive debridement              d. Shoulder arthroscopic biceps tenodesis               e. Shoulder arthroscopic possible microfracture              f.  Shoulder arthroscopic DCE              g. Shoulder open reduction internal fixation greater tuberosity on 10/5/23.      She is a healthy individual and does need clearance for this procedure which she has received from internal medicine.     PAST MEDICAL HISTORY: History reviewed. No pertinent past medical history.  PAST SURGICAL HISTORY:   Past Surgical History:   Procedure Laterality Date     SECTION  2001     FAMILY HISTORY: History reviewed. No pertinent family history.  SOCIAL HISTORY:   Social History     Socioeconomic History    Marital status: Single   Tobacco Use    Smoking status: Never    Smokeless tobacco: Never   Substance and Sexual Activity    Alcohol use: Not Currently    Drug use: Never       MEDICATIONS:   Current Outpatient Medications:     amLODIPine (NORVASC) 5 MG tablet, Take 1 tablet (5 mg total) by mouth once daily., Disp: 30 tablet, Rfl: 11    aspirin (ECOTRIN) 81 MG EC tablet, Take 1 tablet (81 mg total) by mouth once daily. For 4 weeks starting the day after surgery., Disp: 28 tablet, Rfl: 0    docusate sodium (COLACE) 100 MG capsule, Take 1 capsule (100 mg total) by mouth 2 (two) times daily as needed for Constipation., Disp: 20 capsule, Rfl: 0    oxyCODONE-acetaminophen (PERCOCET)  mg per tablet, Take 1 tablet by mouth every 4-6 hours as needed for pain. Take stool softener with this medication., Disp: 21 tablet, Rfl: 0    promethazine (PHENERGAN) 25 MG tablet, Take 1 tablet (25 mg total) by mouth every 6 (six) hours as needed for  "Nausea., Disp: 8 tablet, Rfl: 0    traMADoL (ULTRAM) 50 mg tablet, Take 1-2 tablets ( mg total) by mouth every 6 (six) hours as needed for Pain., Disp: 21 tablet, Rfl: 0  ALLERGIES: Review of patient's allergies indicates:  No Known Allergies    VITAL SIGNS: /73   Pulse 99   Ht 4' 11" (1.499 m)   Wt 93.4 kg (206 lb)   BMI 41.61 kg/m²      Risks, indications and benefits of the surgical procedure were discussed with the patient. All questions with regard to surgery, rehab, expected return to functional activities, activities of daily living and recreational endeavors were answered to her satisfaction.    It was explained to the patient that there may be an increase in surgical risks if the patient has certain co-morbidities such as but not limited to: Obesity, Cardiovascular issues (CHF, CAD, Arrhythmias), chronic pulmonary issues, previous or current neurovascular/neurological issues, previous strokes, diabetes mellitus, previous wound healing issues, previous wound or skin infections, PVD, clotting disorders, if the patient uses chronic steroids, if the patient takes or has immune compromising medications or diseases, or has previously or currently used tobacco products.     The patient verbalized that he/she does not have any additional clotting, bleeding, or blood disorders, other than what is list in her chart on today's review.     Then a brief history and physical exam were performed.    Review of Systems   Constitution: Negative. Negative for chills, fever and night sweats.   HENT: Negative for congestion and headaches.    Eyes: Negative for blurred vision, left vision loss and right vision loss.   Cardiovascular: Negative for chest pain and syncope.   Respiratory: Negative for cough and shortness of breath.    Endocrine: Negative for polydipsia, polyphagia and polyuria.   Hematologic/Lymphatic: Negative for bleeding problem. Does not bruise/bleed easily.   Skin: Negative for dry skin, itching " and rash.   Musculoskeletal: Negative for falls and muscle weakness.   Gastrointestinal: Negative for abdominal pain and bowel incontinence.   Genitourinary: Negative for bladder incontinence and nocturia.   Neurological: Negative for disturbances in coordination, loss of balance and seizures.   Psychiatric/Behavioral: Negative for depression. The patient does not have insomnia.    Allergic/Immunologic: Negative for hives and persistent infections.     PHYSICAL EXAM:  GEN: A&Ox3, WD WN NAD  HEENT: WNL  CHEST: CTAB, no W/R/R  HEART: RRR, no M/R/G  ABD: Soft, NT ND, BS x4 QUADS  MS; See Epic  NEURO: CN II-XII intact       The surgical consent was then reviewed with the patient, who agreed with all the contents of the consent form and it was signed. she was then given the surgery packet to bring with her to surgery center for the anesthesia portion of her perioperative paperwork (if needed)   For all physicians except for Dr. Escobar, we will email and possibly fax the consent forms and booking sheets to [facility].    The patient was given the opportunity to ask questions about the surgical plan and consent form, and once no other questions were asked, I proceeded with the pre-op appointment.    PHYSICAL THERAPY:  She was also instructed regarding physical therapy and will begin on  likely 4 weeks post-op. She was given a copy of the original prescription to schedule. Another copy of this prescription was also faxed to [facility].    POST OP CARE:instructions were reviewed including care of the wound and dressing after surgery and when she can shower. Patient was told not sleep or lay on there surgical extremity following surgery as this could cause repair damage, tissue damage, or nerve injury.    An extensive amount of time was spent on discussion of the following information based on what type of surgery the patient was having. Patient expressed understanding of the material below:    Shoulder  surgery or upper extremity surgery requiring post-op sling:  It was explained to the patient that they should remove their arm from the sling approximately 6 times per day to do full elbow ROM (flexion and extension) and full supination and pronation of the elbow for approximately 5 minutes at a time to help prevent elbow stiffness, nerve pain or problems, or nerve injury. They were told to contact us if they begin having numbness and tingling of there surgical extremity that persists longer then 1 day without relief.     Extremity surgery requiring a splint:   It was explained to patient on how to properly elevated position there extremity to prevent pressure ulcers from occurring. I made sure that the patient understood that that surgical site may be numb following surgery and prevent them from feeling pressure pain that they would normal feel if a pressure injury was occurring. Pressure ulcers and there causes were discussed with the patient today.     Post-operative splint:  It was explain to the patient that they can contact us at anytime if they feel that there is a problem with their splint or under their splint that needs evaluation. If there is concern, questions, or discomfort with the splint then they can present to either our clinic or the [facility] ED for removal, evaluation, and replacement of the splint.    CRUTCHES OR WALKER: It was explained to the patient that if they are having a lower extremity surgery that they will require either a walker or crutches to ambulate safely with after surgery. It was explained that a cane or other assistive devices are not sufficient to safely ambulate with after surgery. I explained to the patient that I will place an order for them to receive either crutches or a walker after surgery to go home with. It was explained that if they have crutches or a walker at home already, that they are REQUIRED to bring them to the hospital on the day of surgery. It was  explained that if they do not have them at the hospital on the day of surgery that they WILL be provided a new pair or crutches or a walker to go home with to ensure ambulation will be safe if the patient needs to stop somewhere on the way home.      PAIN MANAGEMENT: Holly Julian was also given a pain management regime, which includes the option of getting a TENS unit given to her by [facility] (if patient chooses) along with the education required for its use. She was also instructed regarding the Polar ice unit or gel ice packs (chosen by patient) that will be in place after surgery and her postoperative pain medications.     Patient understands that Polar Ice machine has to be bought today if they want it. It cannot be bought on day of surgery at surgery center.     PAIN MEDICATION:  Percocet 10/325mg 1 po q 4-6 hours prn pain  Ultram 50 mg Take 1-2 p.o. q.6 hours p.r.n. breakthrough pain,   Phenergan 25 mg one p.o. q.6 hours p.r.n. nausea and vomiting.    DVT prophylaxis was discussed with the patient today including risk factors for developing DVTs and history of DVTs. The patient was asked if any specific recommendations were given from the doctor/s that did pre-operative surgical clearance. The patient was then given an education sheet about DVTs and PE with warning signs and symptoms of both and steps to take if they suspect either of these.    This along with the Modified Caprini risk assessment model for VTE in general surgical patients was used to determine the patient's DVT risk.     From: Lev RODGERS, Oj DA, Denise SM, et al. Prevention of VTE in nonorthopedic surgical patients: antithrombotic therapy and prevention of thrombosis, 9th ed: American College of Chest [employer]icians evidence-based clinical practical guidelines. Chest 2012; 141:e227S. Copyright © 2012. Reproduced with permission from the American College of Chest Physicians.    The below listed DVT prophylaxis regimen along with bilateral OZZIE  compression stockings will be used post-op. Length of treatment has been determined to be 10-42 days post-op by the above noted Caprini assessment model.     The patient was instructed to buy and take:  Aspirin 81mg QD x 4 weeks for DVT prophylaxis starting on the morning after surgery.  Patient will also use bilateral TEDs on lower extremities, SCDs during surgery, and early ambulation post-op. If the patient was previously taking 81mg baby aspirin, they were told to not take it starting 5 days prior to surgery and to restart the 81mg aspirin after surgery.       Patient was also told to buy over the counter Prilosec medication if needed and take it once daily for GI protection as long as they are taking NSAIDs or Aspirin.   Patient denies history of seizures.      The patient was told that narcotic pain medications may make them drowsy and instructions were given to not sign legal documents, drive or operate heavy machinery, cars, or equipment while under the influence of narcotic medications. The patient was told and understands that narcotic pain medications should only be used as needed to control pain and that other options of pain control include TENs unit and ice packs/unit.     As there were no other questions to be asked, she was given my business card along with Yessi Hahn MD business card if she has any questions or concerns prior to surgery or in the postop period.

## 2023-09-26 NOTE — ANESTHESIA PAT ROS NOTE
2023  Holly Julian is a 53 y.o., female.      Pre-op Assessment    I have reviewed the Patient Summary Reports.       I have reviewed the Medications.     Review of Systems  Anesthesia Hx:  No problems with previous Anesthesia               Denies Personal Hx of Anesthesia complications.                    Social:  Non-Smoker, No Alcohol Use       Hematology/Oncology:  Hematology Normal   Oncology Normal                                   EENT/Dental:  EENT/Dental Normal           Cardiovascular:  Exercise tolerance: good   Hypertension, well controlled    Denies CAD.     Denies Dysrhythmias.     Denies Orthopnea.     Denies LOUIE.   Elevated cholesterol                         Pulmonary:  Pulmonary Normal    Denies Asthma.   Denies Shortness of breath.                  Renal/:  Renal/ Normal  Denies Chronic Renal Disease.                Hepatic/GI:  Hepatic/GI Normal     Denies GERD. Denies Liver Disease.            Musculoskeletal:  Arthritis   Nontraumatic rotator cuff tear, right,  SLAP lesion of right shoulder,  Biceps tendonitis on right,  Arthritis of right acromioclavicular joint,  Closed nondisplaced fracture of greater tuberosity of right humerus,  Decreased right shoulder range of motion, Muscle weakness of upper extremity, Chronic right shoulder pain,  Poor posture            OB/GYN/PEDS:             Neurological:  Neurology Normal   Denies CVA.    Denies Headaches. Denies Seizures.                                Endocrine:  Endocrine Normal Denies Diabetes. Denies Hypothyroidism.        Morbid Obesity / BMI > 40  Psych:  Psychiatric Normal                     Past Medical History:   Diagnosis Date    Essential (primary) hypertension      Past Surgical History:   Procedure Laterality Date     SECTION  2001       Anesthesia Assessment: Preoperative  EQUATION    Planned Procedure: Procedure(s) (LRB):  REPAIR, ROTATOR CUFF, ARTHROSCOPIC (Right)  DEBRIDEMENT, SHOULDER, ARTHROSCOPIC (Right)  FIXATION, TENDON (Right)  ORIF, FRACTURE, HUMERUS (Right)  REPAIR (Right)  EXCISION, CLAVICLE, DISTAL (Right)  Requested Anesthesia Type:General  Surgeon: Yessi Hahn MD  Service: Orthopedics  Known or anticipated Date of Surgery:10/3/2023    Surgeon notes: reviewed    Electronic QUestionnaire Assessment completed via nurse interview with patient.        Triage considerations:     The patient has no apparent active cardiac condition (No unstable coronary Syndrome such as severe unstable angina or recent [<1 month] myocardial infarction, decompensated CHF, severe valvular   disease or significant arrhythmia)    Previous anesthesia records:No problems and Not available    Last PCP note: within 1 month , within [facility]     Patient is cleared/ optimized for surgery at no increased risk per PCP.       Other important co-morbidities: Morbid Obesity, HTN      EKG 8/18/2023:  Vent. Rate : 082 BPM     Atrial Rate : 082 BPM      P-R Int : 150 ms          QRS Dur : 064 ms       QT Int : 388 ms       P-R-T Axes : 055 018 013 degrees      QTc Int : 453 ms   Normal sinus rhythm   Normal ECG   No previous ECGs available   Confirmed by Natalio Davenport MD ([id number]) on 8/21/2023 6:04:12 PM                Instructions given. (See in Nurse's note)      Ht: 4'11  Wt: 202 lb  BMI: 40.92  Vaccinated

## 2023-10-02 ENCOUNTER — TELEPHONE (OUTPATIENT)
Dept: SPORTS MEDICINE | Facility: CLINIC | Age: 53
End: 2023-10-02
Payer: COMMERCIAL

## 2023-10-02 NOTE — TELEPHONE ENCOUNTER
Spoke to the patient in regards to her surgery tomorrow with Dr. Hahn. While on the call I informed the patient that her arrival time is 5:00 am on 10/3 first floor [address]. Patient also reminded nothing to eat or drink past mid-night tonight. Patient can call the office at [phone] if she has any questions or concerns.

## 2023-10-03 ENCOUNTER — ANESTHESIA (OUTPATIENT)
Dept: SURGERY | Facility: HOSPITAL | Age: 53
End: 2023-10-03
Payer: OTHER MISCELLANEOUS

## 2023-10-03 ENCOUNTER — ANESTHESIA EVENT (OUTPATIENT)
Dept: SURGERY | Facility: HOSPITAL | Age: 53
End: 2023-10-03
Payer: OTHER MISCELLANEOUS

## 2023-10-03 ENCOUNTER — HOSPITAL ENCOUNTER (OUTPATIENT)
Facility: HOSPITAL | Age: 53
Discharge: HOME | End: 2023-10-04
Attending: ORTHOPAEDIC SURGERY | Admitting: ORTHOPAEDIC SURGERY
Payer: OTHER MISCELLANEOUS

## 2023-10-03 DIAGNOSIS — G89.29 CHRONIC RIGHT SHOULDER PAIN: Primary | ICD-10-CM

## 2023-10-03 DIAGNOSIS — M25.511 CHRONIC RIGHT SHOULDER PAIN: Primary | ICD-10-CM

## 2023-10-03 DIAGNOSIS — M75.101 NONTRAUMATIC ROTATOR CUFF TEAR, RIGHT: ICD-10-CM

## 2023-10-03 PROCEDURE — 29826 SHO ARTHRS SRG DECOMPRESSION: CPT | Mod: AS,RT,, | Performed by: PHYSICIAN ASSISTANT

## 2023-10-03 PROCEDURE — 29824 PR SHLDR ARTHROSCOP,SURG,DIS CLAVICULECTOMY: ICD-10-PCS | Mod: 51,AS,RT, | Performed by: PHYSICIAN ASSISTANT

## 2023-10-03 PROCEDURE — 37000008 HC ANESTHESIA 1ST 15 MINUTES: Performed by: ORTHOPAEDIC SURGERY

## 2023-10-03 PROCEDURE — 25000003 PHARM REV CODE 250: Performed by: ORTHOPAEDIC SURGERY

## 2023-10-03 PROCEDURE — 63600175 PHARM REV CODE 636 W HCPCS: Performed by: ANESTHESIOLOGY

## 2023-10-03 PROCEDURE — 71000039 HC RECOVERY, EACH ADD'L HOUR: Performed by: ORTHOPAEDIC SURGERY

## 2023-10-03 PROCEDURE — 36000711: Performed by: ORTHOPAEDIC SURGERY

## 2023-10-03 PROCEDURE — 29827 SHO ARTHRS SRG RT8TR CUF RPR: CPT | Mod: RT,,, | Performed by: ORTHOPAEDIC SURGERY

## 2023-10-03 PROCEDURE — 71000033 HC RECOVERY, INTIAL HOUR: Performed by: ORTHOPAEDIC SURGERY

## 2023-10-03 PROCEDURE — 27201423 OPTIME MED/SURG SUP & DEVICES STERILE SUPPLY: Performed by: ORTHOPAEDIC SURGERY

## 2023-10-03 PROCEDURE — 29828 SHO ARTHRS SRG BICP TENODSIS: CPT | Mod: 51,RT,, | Performed by: ORTHOPAEDIC SURGERY

## 2023-10-03 PROCEDURE — 29824 SHO ARTHRS SRG DSTL CLAVICLC: CPT | Mod: 51,AS,RT, | Performed by: PHYSICIAN ASSISTANT

## 2023-10-03 PROCEDURE — 63600175 PHARM REV CODE 636 W HCPCS: Performed by: ORTHOPAEDIC SURGERY

## 2023-10-03 PROCEDURE — 29828 PR ARTHROSCOPY SHOULDER SURGICAL BICEPS TENODESIS: ICD-10-PCS | Mod: 51,AS,RT, | Performed by: PHYSICIAN ASSISTANT

## 2023-10-03 PROCEDURE — 29827 PR SHLDR ARTHROSCOP,SURG,W/ROTAT CUFF REPR: ICD-10-PCS | Mod: RT,,, | Performed by: ORTHOPAEDIC SURGERY

## 2023-10-03 PROCEDURE — D9220A PRA ANESTHESIA: Mod: CRNA,,, | Performed by: NURSE ANESTHETIST, CERTIFIED REGISTERED

## 2023-10-03 PROCEDURE — 29824 SHO ARTHRS SRG DSTL CLAVICLC: CPT | Mod: 51,RT,, | Performed by: ORTHOPAEDIC SURGERY

## 2023-10-03 PROCEDURE — 25000003 PHARM REV CODE 250: Performed by: PHYSICIAN ASSISTANT

## 2023-10-03 PROCEDURE — 29828 PR ARTHROSCOPY SHOULDER SURGICAL BICEPS TENODESIS: ICD-10-PCS | Mod: 51,RT,, | Performed by: ORTHOPAEDIC SURGERY

## 2023-10-03 PROCEDURE — 37000009 HC ANESTHESIA EA ADD 15 MINS: Performed by: ORTHOPAEDIC SURGERY

## 2023-10-03 PROCEDURE — 29824 PR SHLDR ARTHROSCOP,SURG,DIS CLAVICULECTOMY: ICD-10-PCS | Mod: 51,RT,, | Performed by: ORTHOPAEDIC SURGERY

## 2023-10-03 PROCEDURE — 25000003 PHARM REV CODE 250: Performed by: ANESTHESIOLOGY

## 2023-10-03 PROCEDURE — 63600175 PHARM REV CODE 636 W HCPCS: Performed by: NURSE ANESTHETIST, CERTIFIED REGISTERED

## 2023-10-03 PROCEDURE — 29828 SHO ARTHRS SRG BICP TENODSIS: CPT | Mod: 51,AS,RT, | Performed by: PHYSICIAN ASSISTANT

## 2023-10-03 PROCEDURE — 36000710: Performed by: ORTHOPAEDIC SURGERY

## 2023-10-03 PROCEDURE — 99900035 HC TECH TIME PER 15 MIN (STAT)

## 2023-10-03 PROCEDURE — 63600175 PHARM REV CODE 636 W HCPCS: Performed by: PHYSICIAN ASSISTANT

## 2023-10-03 PROCEDURE — 29826 PR SHLDR ARTHROSCOP,PART ACROMIOPLAS: ICD-10-PCS | Mod: AS,RT,, | Performed by: PHYSICIAN ASSISTANT

## 2023-10-03 PROCEDURE — 29827 PR SHLDR ARTHROSCOP,SURG,W/ROTAT CUFF REPR: ICD-10-PCS | Mod: AS,RT,, | Performed by: PHYSICIAN ASSISTANT

## 2023-10-03 PROCEDURE — D9220A PRA ANESTHESIA: ICD-10-PCS | Mod: ANES,,, | Performed by: ANESTHESIOLOGY

## 2023-10-03 PROCEDURE — 29823 SHO ARTHRS SRG XTNSV DBRDMT: CPT | Mod: 51,RT,, | Performed by: ORTHOPAEDIC SURGERY

## 2023-10-03 PROCEDURE — 29823 PR SHLDR ARTHROSCOP,EXTEN DEBRIDE: ICD-10-PCS | Mod: 51,RT,, | Performed by: ORTHOPAEDIC SURGERY

## 2023-10-03 PROCEDURE — 29827 SHO ARTHRS SRG RT8TR CUF RPR: CPT | Mod: AS,RT,, | Performed by: PHYSICIAN ASSISTANT

## 2023-10-03 PROCEDURE — C1713 ANCHOR/SCREW BN/BN,TIS/BN: HCPCS | Performed by: ORTHOPAEDIC SURGERY

## 2023-10-03 PROCEDURE — 94761 N-INVAS EAR/PLS OXIMETRY MLT: CPT

## 2023-10-03 PROCEDURE — 25000003 PHARM REV CODE 250: Performed by: NURSE ANESTHETIST, CERTIFIED REGISTERED

## 2023-10-03 PROCEDURE — D9220A PRA ANESTHESIA: Mod: ANES,,, | Performed by: ANESTHESIOLOGY

## 2023-10-03 PROCEDURE — D9220A PRA ANESTHESIA: ICD-10-PCS | Mod: CRNA,,, | Performed by: NURSE ANESTHETIST, CERTIFIED REGISTERED

## 2023-10-03 DEVICE — PATCH ARTHROFLEX 1.5X20X25MM: Type: IMPLANTABLE DEVICE | Site: SHOULDER | Status: FUNCTIONAL

## 2023-10-03 RX ORDER — DEXAMETHASONE SODIUM PHOSPHATE 4 MG/ML
INJECTION, SOLUTION INTRA-ARTICULAR; INTRALESIONAL; INTRAMUSCULAR; INTRAVENOUS; SOFT TISSUE
Status: DISCONTINUED | OUTPATIENT
Start: 2023-10-03 | End: 2023-10-03

## 2023-10-03 RX ORDER — ONDANSETRON 2 MG/ML
4 INJECTION INTRAMUSCULAR; INTRAVENOUS EVERY 12 HOURS PRN
Status: DISCONTINUED | OUTPATIENT
Start: 2023-10-03 | End: 2023-10-03

## 2023-10-03 RX ORDER — KETAMINE HYDROCHLORIDE 100 MG/ML
INJECTION, SOLUTION INTRAMUSCULAR; INTRAVENOUS
Status: DISCONTINUED | OUTPATIENT
Start: 2023-10-03 | End: 2023-10-03 | Stop reason: HOSPADM

## 2023-10-03 RX ORDER — EPINEPHRINE 1 MG/ML
INJECTION, SOLUTION, CONCENTRATE INTRAVENOUS
Status: DISCONTINUED | OUTPATIENT
Start: 2023-10-03 | End: 2023-10-03 | Stop reason: HOSPADM

## 2023-10-03 RX ORDER — PROMETHAZINE HYDROCHLORIDE 25 MG/1
25 TABLET ORAL EVERY 6 HOURS PRN
Status: DISCONTINUED | OUTPATIENT
Start: 2023-10-03 | End: 2023-10-04 | Stop reason: HOSPADM

## 2023-10-03 RX ORDER — DOCUSATE SODIUM 100 MG/1
100 CAPSULE, LIQUID FILLED ORAL 2 TIMES DAILY PRN
Status: DISCONTINUED | OUTPATIENT
Start: 2023-10-03 | End: 2023-10-04 | Stop reason: HOSPADM

## 2023-10-03 RX ORDER — ROCURONIUM BROMIDE 10 MG/ML
INJECTION, SOLUTION INTRAVENOUS
Status: DISCONTINUED | OUTPATIENT
Start: 2023-10-03 | End: 2023-10-03

## 2023-10-03 RX ORDER — FENTANYL CITRATE 50 UG/ML
25 INJECTION, SOLUTION INTRAMUSCULAR; INTRAVENOUS EVERY 5 MIN PRN
Status: DISCONTINUED | OUTPATIENT
Start: 2023-10-03 | End: 2023-10-03 | Stop reason: HOSPADM

## 2023-10-03 RX ORDER — ONDANSETRON 2 MG/ML
4 INJECTION INTRAMUSCULAR; INTRAVENOUS DAILY PRN
Status: DISCONTINUED | OUTPATIENT
Start: 2023-10-03 | End: 2023-10-03 | Stop reason: HOSPADM

## 2023-10-03 RX ORDER — METHOCARBAMOL 500 MG/1
1000 TABLET, FILM COATED ORAL ONCE AS NEEDED
Status: COMPLETED | OUTPATIENT
Start: 2023-10-03 | End: 2023-10-03

## 2023-10-03 RX ORDER — MORPHINE SULFATE 2 MG/ML
2 INJECTION, SOLUTION INTRAMUSCULAR; INTRAVENOUS EVERY 10 MIN PRN
Status: CANCELLED | OUTPATIENT
Start: 2023-10-03

## 2023-10-03 RX ORDER — MORPHINE SULFATE 2 MG/ML
2 INJECTION, SOLUTION INTRAMUSCULAR; INTRAVENOUS EVERY 10 MIN PRN
Status: DISCONTINUED | OUTPATIENT
Start: 2023-10-03 | End: 2023-10-03

## 2023-10-03 RX ORDER — TRAMADOL HYDROCHLORIDE 50 MG/1
100 TABLET ORAL EVERY 6 HOURS PRN
Status: DISCONTINUED | OUTPATIENT
Start: 2023-10-03 | End: 2023-10-04 | Stop reason: HOSPADM

## 2023-10-03 RX ORDER — KETAMINE HCL IN 0.9 % NACL 50 MG/5 ML
SYRINGE (ML) INTRAVENOUS
Status: DISCONTINUED | OUTPATIENT
Start: 2023-10-03 | End: 2023-10-03

## 2023-10-03 RX ORDER — MIDAZOLAM HYDROCHLORIDE 1 MG/ML
INJECTION INTRAMUSCULAR; INTRAVENOUS
Status: DISCONTINUED | OUTPATIENT
Start: 2023-10-03 | End: 2023-10-03

## 2023-10-03 RX ORDER — SODIUM CHLORIDE 9 MG/ML
INJECTION, SOLUTION INTRAVENOUS CONTINUOUS
Status: DISCONTINUED | OUTPATIENT
Start: 2023-10-03 | End: 2023-10-03

## 2023-10-03 RX ORDER — PROCHLORPERAZINE EDISYLATE 5 MG/ML
5 INJECTION INTRAMUSCULAR; INTRAVENOUS EVERY 30 MIN PRN
Status: DISCONTINUED | OUTPATIENT
Start: 2023-10-03 | End: 2023-10-03 | Stop reason: HOSPADM

## 2023-10-03 RX ORDER — TRAMADOL HYDROCHLORIDE 50 MG/1
100 TABLET ORAL EVERY 6 HOURS PRN
Status: DISCONTINUED | OUTPATIENT
Start: 2023-10-03 | End: 2023-10-03

## 2023-10-03 RX ORDER — ROPIVACAINE HYDROCHLORIDE 5 MG/ML
INJECTION, SOLUTION EPIDURAL; INFILTRATION; PERINEURAL
Status: DISCONTINUED | OUTPATIENT
Start: 2023-10-03 | End: 2023-10-03 | Stop reason: HOSPADM

## 2023-10-03 RX ORDER — KETOROLAC TROMETHAMINE 30 MG/ML
INJECTION, SOLUTION INTRAMUSCULAR; INTRAVENOUS
Status: DISCONTINUED | OUTPATIENT
Start: 2023-10-03 | End: 2023-10-03 | Stop reason: HOSPADM

## 2023-10-03 RX ORDER — PHENYLEPHRINE HYDROCHLORIDE 10 MG/ML
INJECTION INTRAVENOUS
Status: DISCONTINUED | OUTPATIENT
Start: 2023-10-03 | End: 2023-10-03

## 2023-10-03 RX ORDER — OXYCODONE HYDROCHLORIDE 10 MG/1
10 TABLET ORAL EVERY 4 HOURS PRN
Status: DISCONTINUED | OUTPATIENT
Start: 2023-10-03 | End: 2023-10-03

## 2023-10-03 RX ORDER — FENTANYL CITRATE 50 UG/ML
INJECTION, SOLUTION INTRAMUSCULAR; INTRAVENOUS
Status: DISCONTINUED | OUTPATIENT
Start: 2023-10-03 | End: 2023-10-03

## 2023-10-03 RX ORDER — PREGABALIN 75 MG/1
75 CAPSULE ORAL
Status: COMPLETED | OUTPATIENT
Start: 2023-10-03 | End: 2023-10-03

## 2023-10-03 RX ORDER — OXYCODONE HCL 10 MG/1
10 TABLET, FILM COATED, EXTENDED RELEASE ORAL
Status: COMPLETED | OUTPATIENT
Start: 2023-10-03 | End: 2023-10-03

## 2023-10-03 RX ORDER — OXYCODONE HYDROCHLORIDE 10 MG/1
10 TABLET ORAL EVERY 4 HOURS PRN
Status: DISCONTINUED | OUTPATIENT
Start: 2023-10-03 | End: 2023-10-04 | Stop reason: HOSPADM

## 2023-10-03 RX ORDER — LIDOCAINE HYDROCHLORIDE 20 MG/ML
INJECTION INTRAVENOUS
Status: DISCONTINUED | OUTPATIENT
Start: 2023-10-03 | End: 2023-10-03

## 2023-10-03 RX ORDER — CEFAZOLIN SODIUM 1 G/3ML
2 INJECTION, POWDER, FOR SOLUTION INTRAMUSCULAR; INTRAVENOUS ONCE
Status: COMPLETED | OUTPATIENT
Start: 2023-10-03 | End: 2023-10-03

## 2023-10-03 RX ORDER — HALOPERIDOL 5 MG/ML
0.5 INJECTION INTRAMUSCULAR EVERY 10 MIN PRN
Status: DISCONTINUED | OUTPATIENT
Start: 2023-10-03 | End: 2023-10-03 | Stop reason: HOSPADM

## 2023-10-03 RX ORDER — ONDANSETRON 4 MG/1
4 TABLET, ORALLY DISINTEGRATING ORAL EVERY 8 HOURS PRN
Status: DISCONTINUED | OUTPATIENT
Start: 2023-10-03 | End: 2023-10-04 | Stop reason: HOSPADM

## 2023-10-03 RX ORDER — PREGABALIN 75 MG/1
75 CAPSULE ORAL ONCE
Status: COMPLETED | OUTPATIENT
Start: 2023-10-03 | End: 2023-10-03

## 2023-10-03 RX ORDER — NEOSTIGMINE METHYLSULFATE 0.5 MG/ML
INJECTION, SOLUTION INTRAVENOUS
Status: DISCONTINUED | OUTPATIENT
Start: 2023-10-03 | End: 2023-10-03

## 2023-10-03 RX ORDER — AMLODIPINE BESYLATE 5 MG/1
5 TABLET ORAL DAILY
Status: DISCONTINUED | OUTPATIENT
Start: 2023-10-03 | End: 2023-10-04 | Stop reason: HOSPADM

## 2023-10-03 RX ORDER — CLINDAMYCIN HYDROCHLORIDE 150 MG/1
450 CAPSULE ORAL
Status: COMPLETED | OUTPATIENT
Start: 2023-10-03 | End: 2023-10-03

## 2023-10-03 RX ORDER — HYDROMORPHONE HYDROCHLORIDE 1 MG/ML
0.2 INJECTION, SOLUTION INTRAMUSCULAR; INTRAVENOUS; SUBCUTANEOUS EVERY 5 MIN PRN
Status: DISCONTINUED | OUTPATIENT
Start: 2023-10-03 | End: 2023-10-03 | Stop reason: HOSPADM

## 2023-10-03 RX ORDER — OXYCODONE HCL 10 MG/1
10 TABLET, FILM COATED, EXTENDED RELEASE ORAL EVERY 12 HOURS
Status: DISCONTINUED | OUTPATIENT
Start: 2023-10-03 | End: 2023-10-03

## 2023-10-03 RX ORDER — ONDANSETRON 2 MG/ML
INJECTION INTRAMUSCULAR; INTRAVENOUS
Status: DISCONTINUED | OUTPATIENT
Start: 2023-10-03 | End: 2023-10-03

## 2023-10-03 RX ORDER — PROPOFOL 10 MG/ML
VIAL (ML) INTRAVENOUS
Status: DISCONTINUED | OUTPATIENT
Start: 2023-10-03 | End: 2023-10-03

## 2023-10-03 RX ORDER — ONDANSETRON 2 MG/ML
4 INJECTION INTRAMUSCULAR; INTRAVENOUS EVERY 12 HOURS PRN
Status: DISCONTINUED | OUTPATIENT
Start: 2023-10-03 | End: 2023-10-04 | Stop reason: HOSPADM

## 2023-10-03 RX ADMIN — DEXAMETHASONE SODIUM PHOSPHATE 8 MG: 4 INJECTION, SOLUTION INTRAMUSCULAR; INTRAVENOUS at 07:10

## 2023-10-03 RX ADMIN — CEFAZOLIN 2 G: 2 INJECTION, POWDER, FOR SOLUTION INTRAMUSCULAR; INTRAVENOUS at 10:10

## 2023-10-03 RX ADMIN — PROPOFOL 150 MG: 10 INJECTION, EMULSION INTRAVENOUS at 07:10

## 2023-10-03 RX ADMIN — SODIUM CHLORIDE, SODIUM GLUCONATE, SODIUM ACETATE, POTASSIUM CHLORIDE, MAGNESIUM CHLORIDE, SODIUM PHOSPHATE, DIBASIC, AND POTASSIUM PHOSPHATE: .53; .5; .37; .037; .03; .012; .00082 INJECTION, SOLUTION INTRAVENOUS at 08:10

## 2023-10-03 RX ADMIN — MIDAZOLAM HYDROCHLORIDE 2 MG: 2 INJECTION, SOLUTION INTRAMUSCULAR; INTRAVENOUS at 06:10

## 2023-10-03 RX ADMIN — METHOCARBAMOL 1000 MG: 500 TABLET ORAL at 10:10

## 2023-10-03 RX ADMIN — PREGABALIN 75 MG: 75 CAPSULE ORAL at 05:10

## 2023-10-03 RX ADMIN — SODIUM CHLORIDE: 0.9 INJECTION, SOLUTION INTRAVENOUS at 06:10

## 2023-10-03 RX ADMIN — FENTANYL CITRATE 100 MCG: 50 INJECTION, SOLUTION INTRAMUSCULAR; INTRAVENOUS at 06:10

## 2023-10-03 RX ADMIN — TRAMADOL HYDROCHLORIDE 100 MG: 50 TABLET, COATED ORAL at 11:10

## 2023-10-03 RX ADMIN — LIDOCAINE HYDROCHLORIDE 60 MG: 20 INJECTION INTRAVENOUS at 07:10

## 2023-10-03 RX ADMIN — ROCURONIUM BROMIDE 50 MG: 10 INJECTION, SOLUTION INTRAVENOUS at 07:10

## 2023-10-03 RX ADMIN — ONDANSETRON 4 MG: 2 INJECTION INTRAMUSCULAR; INTRAVENOUS at 07:10

## 2023-10-03 RX ADMIN — PHENYLEPHRINE HYDROCHLORIDE 50 MCG: 10 INJECTION INTRAVENOUS at 08:10

## 2023-10-03 RX ADMIN — DEXTROSE MONOHYDRATE 2 G: 50 INJECTION, SOLUTION INTRAVENOUS at 07:10

## 2023-10-03 RX ADMIN — PROCHLORPERAZINE EDISYLATE 5 MG: 5 INJECTION INTRAMUSCULAR; INTRAVENOUS at 02:10

## 2023-10-03 RX ADMIN — CLINDAMYCIN HYDROCHLORIDE 450 MG: 150 CAPSULE ORAL at 05:10

## 2023-10-03 RX ADMIN — AMLODIPINE BESYLATE 5 MG: 5 TABLET ORAL at 04:10

## 2023-10-03 RX ADMIN — GLYCOPYRROLATE 0.4 MG: 0.2 INJECTION, SOLUTION INTRAMUSCULAR; INTRAVENOUS at 09:10

## 2023-10-03 RX ADMIN — OXYCODONE HYDROCHLORIDE 10 MG: 10 TABLET, FILM COATED, EXTENDED RELEASE ORAL at 05:10

## 2023-10-03 RX ADMIN — HALOPERIDOL LACTATE 0.5 MG: 5 INJECTION, SOLUTION INTRAMUSCULAR at 02:10

## 2023-10-03 RX ADMIN — ONDANSETRON 4 MG: 2 INJECTION INTRAMUSCULAR; INTRAVENOUS at 11:10

## 2023-10-03 RX ADMIN — Medication 20 MG: at 07:10

## 2023-10-03 RX ADMIN — NEOSTIGMINE METHYLSULFATE 4 MG: 0.5 INJECTION INTRAVENOUS at 09:10

## 2023-10-03 RX ADMIN — PREGABALIN 75 MG: 75 CAPSULE ORAL at 10:10

## 2023-10-03 NOTE — ADDENDUM NOTE
Addendum  created 10/03/23 1357 by Bryon Velez MD    Order list changed, Pharmacy for encounter modified

## 2023-10-03 NOTE — OPERATIVE NOTE ADDENDUM
Certification of Assistant at Surgery       Surgery Date: 10/3/2023     Participating Surgeons:  Surgeon(s) and Role:     * Yessi Hahn MD - Primary    JILL Hutchins PA-C - 1st Assistant     Procedures:  Procedure(s) (LRB):  REPAIR, ROTATOR CUFF, ARTHROSCOPIC WITH CUFF MEND (Right)  EXTENSIVE DEBRIDEMENT, SHOULDER, ARTHROSCOPIC (Right)  EXCISION, CLAVICLE, DISTAL (Right)  ARTHROSCOPY,SHOULDER,WITH BICEPS TENODESIS (Right)  ARTHROSCOPY, SHOULDER, WITH SUBACROMIAL  DECOMPRESSION/ BURSECTOMY (Right)  ARTHROSCOPY, SHOULDER WITH L;ABRAL DEBRIDEMENT (Right)  LYSIS, ADHESIONS, SHOULDER, ARTHROSCOPIC (Right)    Assistant Surgeon's Certification of Necessity:  I understand that section 1842 (b) (6) (d) of the Social Security Act generally prohibits Medicare Part B reasonable charge payment for the services of assistants at surgery in teaching hospitals when qualified residents are available to furnish such services. I certify that the services for which payment is claimed were medically necessary, and that no qualified resident was available to perform the services. I further understand that these services are subject to post-payment review by the Medicare carrier.         Vladislav Hutchins PA-C    10/03/2023  2:25 PM

## 2023-10-03 NOTE — OP NOTE
DATE OF PROCEDURE: 10/03/2023    SURGEON: Yessi Hahn M.D.    ASSISTANT: JILL Hutchins PA-C  The use of an assistant was medically necessary for positioning, skin retraction, and assistance with this procedure. The procedure could not be performed without the use of an assistant.   There was no qualified resident/fellow available for assistance with this procedure.  ASSISTANT: SMA Jen      PREOPERATIVE DIAGNOSES:   right  1. Shoulder rotator cuff tear   2. Shoulder biceps tendonitis  3. Shoulder synovitis  4. Shoulder SLAP  5. Shoulder impingement, bursitis  6. Shoulder adhesions  7. Shoulder AC arthritis    POSTOPERATIVE DIAGNOSES:   right  1. Shoulder rotator cuff tear   2. Shoulder biceps tendonitis  3. Shoulder synovitis  4. Shoulder SLAP  5. Shoulder impingement, bursitis  6. Shoulder adhesions  7. Shoulder AC arthritis    OPERATION:   right  1. Shoulder arthroscopic rotator cuff repair (CPT 13153) with CuffMend ()  2. Shoulder arthroscopic biceps tenodesis (CPT 25903)  3. Shoulder arthroscopic subacromial decompression, bursectomy   4. Shoulder arthroscopic extensive debridement (anterior, posterior glenohumeral joint, subacromial space) (CPT 82203)  5. Shoulder arthroscopic labral debridement (CPT 15763)  6. Shoulder arthroscopic lysis of adhesions (CPT 10715)  Shoulder arthroscopic distal clavicle excision (CPT 84829)    ANESTHESIA:  General with intra-op suprascapular nerve block with local    ESTIMATED BLOOD LOSS:  Minimal.    TOURNIQUET TIME:  None.    DRAINS:  None.    COMPLICATIONS:  None.  The patient was moved to the recovery room in stable condition with compartments soft and cap refill less than a second in all digits.    I was physically present during the critical/key portion(s) of the procedure.    INDICATIONS/MEDICAL NECESSITY: The patient is a 53 y.o. year-old female who has history and physical examination findings consistent with the above. Preoperative studies revealed Tear,  Rotator Cuff, traumatic S46.019A.  Patient was noted to have problems along the anterior and superior rotator cuff structures. The patient had clinical evidence of weakness and pain with overhead maneuvers. MRI was obtained revealing evidence of rotator cuff damage, which was consistent with the above stated preoperative diagnoses.   Nonoperative versus operative options were discussed.  The risks and benefits were discussed with the patient.  The patient acknowledged understanding and wished to proceed with operative intervention.  Informed consent was obtained prior to the procedure.  Reasonable expectations and potential complications were discussed and acknowledged, including but not limited to infection, bleeding, blood clots, (DVT and/or PE), nerve injury, tear, instability, continued pain and stiffness.  They agreed and understood and wished to proceed.    EXAMINATION UNDER ANESTHESIA of the right SHOULDER: Forward elevation 175 degrees, External rotation at 0 60 degrees, External rotation at 90 90 degrees, Internal rotation at 90 60 degrees.  Translation testing: anterior grade 1, posterior grade 1, sulcus sign grade 1 corrects to 0 on external rotation.    EXAMINATION UNDER ANESTHESIA of the left SHOULDER: Forward elevation 175 degrees, External rotation at 0 60 degrees, External rotation at 90 90 degrees, Internal rotation at 90 60 degrees.  Translation testing: anterior grade 1, posterior grade 1, sulcus sign grade 1 corrects to 0 on external rotation.    PROCEDURE IN DETAIL:  After the correct operative site was marked by the operating surgeon. The patient was then taken to the operating room and placed supine on the operating room table, where the patient  underwent general anesthesia by the anesthesia team.  The patient was then rolled into the lateral decubitus position with the operative side up.  A well-padded axillary roll, beanbag and pillows were placed.  All pressure points were carefully padded  and checked.  The upper extremities and both lower extremities were placed in comfortable positions and were also well-padded.  The operative upper extremity was then prepped and draped in the usual sterile fashion.    Suprascapular nerve block was performed in the standard fashion with 5cc local anesthetic.    The Spider arm positioner was implemented with balanced suspension and appropriate landmarks were noted on the skin.  A posterior followed by aida-superior portals were created and systematic examination of the joint revealed the following:      There was no evidence of any significant chondral lesions to the glenoid or humeral head.     Tenosynovitis and SLAP type II was noted to the biceps tendon on ramp sign.    Partial thickness 60% cuff tearing on bursal side was debrided with shaver and gently with thermal device.    There was some synovitis and tearing to the labrum that was debrided as needed superiorly and anteriorly.  Biceps auto-tenodesis was performed using thermal device.  Part of superior labrum was included to allow the biceps tendon to auto-tenodese.  Attention was then turned to the rotator cuff.  The rotator cuff undersurface was then visualized and debrided as needed using a shaver.     Attention was then turned to the subacromial space where a significant hypertrophic bursa was encountered.  The bursa itself was thickened and hypertrophic.  A lateral portal was created to assist with the bursectomy.  Subacromial decompression was completed using three-portal technique in the standard fashion with a 4.5 mm marcia without difficulty.  The anterior osteophyte was flattened.  Confirmation of adequate resection was confirmed while viewing from the lateral portal.      Next, attention was then turned to the distal clavicle excision.  A thermal device was used to clear the soft tissue of the length of the AC joint approximately 1cm medial to the end of the distal end of the clavicle.  The  anterior portal which was established earlier was used to perform the AC resection at the level of the AC joint  with arthroscopic marcia.  The adequacy of the the resection was confirmed while viewing from the anterior portal.  Care was taken to resect enough postero-superiorly.      Shoulder arthroscopic lysis of adhesions (CPT 53091):  With the arthroscope in the subacromial space, an extensive lysis of adhesions needed to be performed with lysis of adhesions in the lateral gutter, posterior gutter, and anterior gutter where there was extensive scarring from the chronic nature of the rotator cuff tear.  After the lysis of adhesions, the mobility was restored to the rotator cuff tissue and shoulder.    The surface of the rotator cuff was then gently debrided and mobilized.  We did this using a shaver while viewing through the posterior portal and the shaver used through the lateral portal.  We were then able to mobilize the cuff tear which was located at the supraspinatus extending to the infraspinatus.  Rotator cuff was inspected the rotator cuff from the subacromial side.There was a clearly demonstrated rotator cuff tear with size and pattern as reported. The rotator cuff was mobilized on its superficial and deep surfaces to allow maximal placement over the anatomic footprint of the greater tuberosity. The rotator cuff was mobilized on its superficial and deep surfaces to allow maximal placement over the anatomic footprint of the greater tuberosity.     Passport cannula was placed into the widened lateral portal and the Cuffmend device was introduced and deployed to repair and augment and reinforce the compromised torn rotator cuff tissue.  Fiberstitch RC 1.5 x 2 were placed to stabilize the graft to the native tendon under direct Nanoscope visualization posteriorly.  2 3.5 mm Arthrex SP PushLock anchors were placed, anteriorly and posteriorly without excess tension securing the graft down  laterally    Suprascapular nerve block was performed in the standard fashion with 5cc local anesthetic, and 5cc subacromially for local.  Local was placed about portals and incision(s) after irrigation, as described below, was completed. The shoulder and subacromial space were then irrigated and fluid was extravasated using suction. All portals were reapproximated using inverted 4-0 Monocryl suture in the subcutaneous tissue of the portals. Mastisol and Steri-strips were placed with xeroform, 4x4s, abd pad, and Medi-pore tape.  TENS unit pads were placed which were medically necessary for pain relief.  An iceman was secured in the shoulder amezquita.  A sling with an abduction pillow was secured.  The patient was then moved to supine, extubated and taken to the recovery room where the patient arrived in stable condition with the compartments of the arm and forearm soft and cap refill less than a second in all digits.     POSTOPERATIVE PLAN: We will follow the arthroscopic rotator cuff repair guidelines for a small size rotator cuff tear.  We discussed with the patient's family after surgery.  The patient will remain in a sling for 6 weeks.  PT to start at 1-2 weeks.    Cuff specific program:  Pendulum exercises and Codman's exercises in 5-7 days, protecting rotator cuff repair for 1 week by avoiding active motion program until 1 week.     PASSIVE ROM: ER side 30 degrees, Forward Flex 90 degrees, ABD - 60 degrees   Full AAROM/PROM starting at 5-7 days as tolerated       Quality of tissue: fair     Quality of the repair: good      Size of tear: 10 x 15 mm, 60% partial thickness bursal

## 2023-10-03 NOTE — PLAN OF CARE
Discharge plan reviewed w/ pt and daughter at bedside. AVSS on RA. R shoulder dressing CDI, sling in place. No c/o pain. All Rx delivered to bedside. Pt states she is ready for discharge.    Quinolones Counseling:  I discussed with the patient the risks of fluoroquinolones including but not limited to GI upset, allergic reaction, drug rash, diarrhea, dizziness, photosensitivity, yeast infections, liver function test abnormalities, tendonitis/tendon rupture.

## 2023-10-03 NOTE — NURSING
Attempts times two, unsuccessful in placing new peripheral IV. Unable to use right hand due to surgery to right shoulder. Pt requests new RN to try after she eats her meal and drinks more water. Has voided x 2. Denies nausea, no vomiting has occurred since arrival to unit.

## 2023-10-03 NOTE — PLAN OF CARE
Preop complete. Daughter at BS. Pt resting comfortably. No distress noted. Call light in reach. Side rails up,bed in lowest position

## 2023-10-03 NOTE — PLAN OF CARE
"Care plan reviewed w/ pt and family at bedside. AVSS on 2L NC. R shoulder dressing CDI, sling in place. No c/o pain. All Rx delivered to bedside per Pharmacy. Pt repeatedly reporting "dizziness" with movement, BPs stable. X2 episodes vomiting, PRN medications given w/ effect. Team aware, transfer orders placed. Report given to Teri in Recovery Suite. Pt transferred to room 301.   "

## 2023-10-03 NOTE — ANESTHESIA PREPROCEDURE EVALUATION
10/03/2023  Holly Julian is a 53 y.o., female.      Pre-op Assessment    I have reviewed the Patient Summary Reports.     I have reviewed the Nursing Notes. I have reviewed the NPO Status.   I have reviewed the Medications.     Review of Systems  Anesthesia Hx:  No problems with previous Anesthesia  History of prior surgery of interest to airway management or planning:  Denies Personal Hx of Anesthesia complications.   Social:  Non-Smoker, No Alcohol Use    Hematology/Oncology:  Hematology Normal        EENT/Dental:EENT/Dental Normal   Cardiovascular:   Hypertension  Denies CHF.  Denies LOUIE.    Pulmonary:  Pulmonary Normal  Denies Shortness of breath.    Renal/:  Renal/ Normal     Hepatic/GI:  Hepatic/GI Normal  Denies GERD.    Neurological:  Neurology Normal    Endocrine:  Morbid Obesity / BMI > 40  Dermatological:  Skin Normal    Psych:  Psychiatric Normal           Physical Exam  General: Well nourished, Cooperative, Alert and Oriented    Airway:  Mallampati: III   Mouth Opening: Normal  TM Distance: Normal  Tongue: Normal  Neck ROM: Normal ROM    Dental:  Intact    Chest/Lungs:  Clear to auscultation, Normal Respiratory Rate    Heart:  Rate: Normal  Rhythm: Regular Rhythm  Sounds: Normal        Anesthesia Plan  Type of Anesthesia, risks & benefits discussed:    Anesthesia Type: Gen ETT  Intra-op Monitoring Plan: Standard ASA Monitors  Post Op Pain Control Plan: multimodal analgesia and IV/PO Opioids PRN  Induction:  IV  Airway Plan: Video, Post-Induction  Informed Consent: Informed consent signed with the Patient and all parties understand the risks and agree with anesthesia plan.  All questions answered.   ASA Score: 3  Day of Surgery Review of History & Physical: H&P Update referred to the surgeon/provider.    Ready For Surgery From Anesthesia Perspective.     .

## 2023-10-03 NOTE — NURSING
Report received. Care assumed. Patient arrived to unit AAOx4 in hospital bed from PACU. VSS, IVF infusing. Dressing to right shoulder, CDI.  ICE pack intact, arm in sling. +2 radial pulses bilaterally, pt denies numbness or tingling. Assisted pt up to commode with 2 person assist, pt voided with out difficulty. While wiping herself, pt inadvertently removed her IV. Returned safely to bed. Pt lying supine in bed. 4 family members at bedside. Pt denies pain or any other concerns at this time. Denies nausea, no vomiting. See assessment. Patient oriented to room. Bed in lowest position, side rails up x2, bed wheels locked and call light within reach.  Pt instructed to call for assistance, verbalized understanding. NADN. Will continue to monitor.

## 2023-10-03 NOTE — DISCHARGE SUMMARY
[place] - Surgery ([facility])  Brief Operative Note    Surgery Date: 10/3/2023     Surgeon(s) and Role:     * Yessi Hahn MD - Primary    Assisting Surgeon: None    JILL Hutchins PA-C - 1st Assistant     Pre-op Diagnosis:  Nontraumatic rotator cuff tear, right [M75.101]  SLAP lesion of right shoulder [S43.431A]  Biceps tendonitis on right [M75.21]  Arthritis of right acromioclavicular joint [M19.011]  Closed nondisplaced fracture of greater tuberosity of right humerus, initial encounter [S42.254A]    Post-op Diagnosis:  Post-Op Diagnosis Codes:     * Nontraumatic rotator cuff tear, right [M75.101]     * SLAP lesion of right shoulder [S43.431A]     * Biceps tendonitis on right [M75.21]     * Arthritis of right acromioclavicular joint [M19.011]     * Closed nondisplaced fracture of greater tuberosity of right humerus, initial encounter [S42.254A]    Procedure(s) (LRB):  REPAIR, ROTATOR CUFF, ARTHROSCOPIC WITH CUFF MEND (Right)  DEBRIDEMENT, SHOULDER, ARTHROSCOPIC (Right)  REPAIR (Right)  EXCISION, CLAVICLE, DISTAL (Right)    Anesthesia: General    Operative Findings: right shoulder arthroscopy     Estimated Blood Loss: minimal          Specimens:   Specimen (24h ago, onward)      None              Discharge Note    OUTCOME: Patient tolerated treatment/procedure well without complication and is now ready for discharge.    DISPOSITION: Home or Self Care    FINAL DIAGNOSIS:  right shoulder rotator cuff tear    FOLLOWUP: In clinic    DISCHARGE INSTRUCTIONS:    Discharge Procedure Orders   Diet general     Call MD for:  temperature >100.4     Call MD for:  persistent nausea and vomiting     Call MD for:  severe uncontrolled pain     Call MD for:  difficulty breathing, headache or visual disturbances     Call MD for:  redness, tenderness, or signs of infection (pain, swelling, redness, odor or green/yellow discharge around incision site)     Call MD for:  hives     Call MD for:  persistent dizziness or light-headedness      Ice to affected area   Order Comments: using barrier between ice and skin (specify duration&frequency)     No driving, operating heavy equipment or signing legal documents while taking pain medication     Remove dressing in 72 hours     Shower on day dressing removed (No bath)

## 2023-10-03 NOTE — PLAN OF CARE
Problem: Adult Inpatient Plan of Care  Goal: Plan of Care Review  Flowsheets (Taken 10/3/2023 1741)  Plan of Care Reviewed With:   patient   caregiver  Goal: Patient-Specific Goal (Individualized)  Flowsheets (Taken 10/3/2023 1741)  Individualized Care Needs: monitor nausea and vomiting. enc po intake.     Problem: Bleeding (Shoulder Arthroplasty)  Goal: Absence of Bleeding  Intervention: Monitor and Manage Bleeding  Flowsheets (Taken 10/3/2023 1741)  Bleeding Management: dressing monitored     Problem: Fluid and Electrolyte Imbalance (Shoulder Arthroplasty)  Goal: Fluid and Electrolyte Balance  Intervention: Monitor and Manage Fluid and Electrolyte Balance  Flowsheets (Taken 10/3/2023 1741)  Fluid/Electrolyte Management: oral rehydration therapy initiated     Problem: Pain (Shoulder Arthroplasty)  Goal: Acceptable Pain Control  Intervention: Prevent or Manage Pain  Flowsheets (Taken 10/3/2023 1741)  Pain Management Interventions:   pain management plan reviewed with patient/caregiver   position adjusted   pillow support provided   quiet environment facilitated     Problem: Postoperative Urinary Retention (Shoulder Arthroplasty)  Goal: Effective Urinary Elimination  Intervention: Monitor and Manage Urinary Retention  Flowsheets (Taken 10/3/2023 1741)  Urinary Elimination Promotion:   toileting offered   frequent voiding encouraged   Plan of care reviewed with patient; verbalized understanding.   Medications reviewed and administered as ordered.  Rounding for safety and patient care per policy.   Safety precautions maintained. Patient working appropriately with PT/OT.  DME at bedside.  Call light within reach, bed wheels locked, bed in lowest position, side rails ^x2, safety maintained. NADN, Will continue monitor.

## 2023-10-03 NOTE — PLAN OF CARE
Dr. Velez and Resp at bedside and updated on Pt VS. SpO2 93% on RA.  Pt instructed/ educated on incentive spirometer use. Daughter at bedside.

## 2023-10-03 NOTE — TRANSFER OF CARE
"Anesthesia Transfer of Care Note    Patient: Holly Julian    Procedure(s) Performed: Procedure(s) (LRB):  REPAIR, ROTATOR CUFF, ARTHROSCOPIC WITH CUFF MEND (Right)  DEBRIDEMENT, SHOULDER, ARTHROSCOPIC (Right)  REPAIR (Right)  EXCISION, CLAVICLE, DISTAL (Right)    Patient location: PACU    Anesthesia Type: general    Transport from OR: Transported from OR on 6-10 L/min O2 by face mask with adequate spontaneous ventilation    Post pain: adequate analgesia    Post assessment: no apparent anesthetic complications and tolerated procedure well    Post vital signs: stable    Level of consciousness: awake, alert and oriented    Nausea/Vomiting: no nausea/vomiting    Complications: none    Transfer of care protocol was followed      Last vitals:   Visit Vitals  /53   Pulse 75   Temp 36.2 °C (97.1 °F) (Oral)   Resp 20   Ht 4' 11" (1.499 m)   Wt 90.7 kg (200 lb)   SpO2 98%   Breastfeeding No   BMI 40.40 kg/m²     "

## 2023-10-03 NOTE — NURSING
JILL Hutchins PA-C notified of pt accidental removal of PIV while using bathroom.pt arrived to unit without IVF and no IVF are ordered. Attempting to placed IV. NO continuous IVF are needed if pt is tolerating po, per PA.   Pt is sitting up drinking soda, and has voided once.   tm

## 2023-10-03 NOTE — PROGRESS NOTES
[place] - Surgery ([facility])  Brief Operative Note    SUMMARY     Surgery Date: 10/3/2023     Surgeon(s) and Role:     * Yessi Hahn MD - Primary    Assisting Surgeon: None    Pre-op Diagnosis:  Nontraumatic rotator cuff tear, right [M75.101]  SLAP lesion of right shoulder [S43.431A]  Biceps tendonitis on right [M75.21]  Arthritis of right acromioclavicular joint [M19.011]  Closed nondisplaced fracture of greater tuberosity of right humerus, initial encounter [S42.254A]    Post-op Diagnosis:  Post-Op Diagnosis Codes:     * Nontraumatic rotator cuff tear, right [M75.101]     * SLAP lesion of right shoulder [S43.431A]     * Biceps tendonitis on right [M75.21]     * Arthritis of right acromioclavicular joint [M19.011]     * Closed nondisplaced fracture of greater tuberosity of right humerus, initial encounter [S42.254A]    Procedure(s) (LRB):  REPAIR, ROTATOR CUFF, ARTHROSCOPIC WITH CUFF MEND (Right)  EXTENSIVE DEBRIDEMENT, SHOULDER, ARTHROSCOPIC (Right)  EXCISION, CLAVICLE, DISTAL (Right)  ARTHROSCOPY,SHOULDER,WITH BICEPS TENODESIS (Right)  ARTHROSCOPY, SHOULDER, WITH SUBACROMIAL  DECOMPRESSION/ BURSECTOMY (Right)  ARTHROSCOPY, SHOULDER WITH L;ABRAL DEBRIDEMENT (Right)  LYSIS, ADHESIONS, SHOULDER, ARTHROSCOPIC (Right)    Anesthesia: General    Operative Findings: right shoulder arthroscopy    Estimated Blood Loss: minimal     Estimated Blood Loss has been documented.         Specimens:   Specimen (24h ago, onward)      None            [id number]    Patient being admitted due to uncontrolled nausea and vomiting.

## 2023-10-03 NOTE — ANESTHESIA PROCEDURE NOTES
Intubation    Date/Time: 10/3/2023 7:10 AM    Performed by: Martina Arita CRNA  Authorized by: Pam Madera MD    Intubation:     Induction:  Intravenous    Intubated:  Postinduction    Mask Ventilation:  Easy mask    Attempts:  2    Attempted By:  CRNA    Method of Intubation:  Video laryngoscopy    Blade:  Esqueda 3    Laryngeal View Grade: Grade I - full view of cords      Attempted By (2nd Attempt):  CRNA    Method of Intubation (2nd Attempt):  Video laryngoscopy    Blade (2nd Attempt):  Esqueda 3    Laryngeal View Grade (2nd Attempt): Grade I - full view of cords      Difficult Airway Encountered?: No      Complications:  None    Airway Device:  Oral endotracheal tube    Airway Device Size:  7.0    Style/Cuff Inflation:  Cuffed    Tube secured:  22    Secured at:  The lips    Placement Verified By:  Capnometry    Complicating Factors:  Small mouth, obesity, short neck and poor neck/head extension    Findings Post-Intubation:  BS equal bilateral and atraumatic/condition of teeth unchanged  Notes:      Leak detected with first ETT. Unable to inflate properly. Removed and replaced with new 7.0 ETT without difficulty.

## 2023-10-03 NOTE — ANESTHESIA POSTPROCEDURE EVALUATION
Anesthesia Post Evaluation    Patient: Holly Julian    Procedure(s) Performed: Procedure(s) (LRB):  REPAIR, ROTATOR CUFF, ARTHROSCOPIC WITH CUFF MEND (Right)  DEBRIDEMENT, SHOULDER, ARTHROSCOPIC (Right)  REPAIR (Right)  EXCISION, CLAVICLE, DISTAL (Right)    Final Anesthesia Type: general      Patient location during evaluation: PACU  Patient participation: Yes- Able to Participate  Level of consciousness: awake and alert and oriented  Post-procedure vital signs: reviewed and stable  Pain management: adequate  Airway patency: patent    PONV status at discharge: No PONV  Anesthetic complications: no      Cardiovascular status: blood pressure returned to baseline and hemodynamically stable  Respiratory status: unassisted, spontaneous ventilation and room air  Hydration status: euvolemic  Follow-up not needed.          Vitals Value Taken Time   /61 10/03/23 1046   Temp 36.2 °C (97.2 °F) 10/03/23 0956   Pulse 76 10/03/23 1055   Resp 21 10/03/23 1055   SpO2 93 % 10/03/23 1055   Vitals shown include unvalidated device data.      No case tracking events are documented in the log.      Pain/Rishabh Score: Rishabh Score: 3 (10/3/2023  9:56 AM)

## 2023-10-04 VITALS
HEIGHT: 59 IN | SYSTOLIC BLOOD PRESSURE: 151 MMHG | BODY MASS INDEX: 40.32 KG/M2 | DIASTOLIC BLOOD PRESSURE: 70 MMHG | WEIGHT: 200 LBS | OXYGEN SATURATION: 98 % | HEART RATE: 87 BPM | TEMPERATURE: 99 F | RESPIRATION RATE: 16 BRPM

## 2023-10-04 PROCEDURE — 94761 N-INVAS EAR/PLS OXIMETRY MLT: CPT

## 2023-10-04 PROCEDURE — 25000003 PHARM REV CODE 250: Performed by: PHYSICIAN ASSISTANT

## 2023-10-04 PROCEDURE — 99900035 HC TECH TIME PER 15 MIN (STAT)

## 2023-10-04 RX ADMIN — TRAMADOL HYDROCHLORIDE 100 MG: 50 TABLET, COATED ORAL at 09:10

## 2023-10-04 RX ADMIN — AMLODIPINE BESYLATE 5 MG: 5 TABLET ORAL at 09:10

## 2023-10-04 NOTE — NURSING
Attempted to try to restart IV. Patient requests that we not start another IV, wants to know if antibiotic can be given in a different route. Discussed with Linda Crouch PA-C. Route changed to IM, ok to not have IV access.

## 2023-10-04 NOTE — PLAN OF CARE
Problem: Adult Inpatient Plan of Care  Goal: Plan of Care Review  Outcome: Ongoing, Progressing  Flowsheets (Taken 10/4/2023 0442)  Plan of Care Reviewed With:   patient   daughter  Goal: Patient-Specific Goal (Individualized)  Outcome: Ongoing, Progressing  Flowsheets (Taken 10/4/2023 0442)  Anxieties, Fears or Concerns: None  Individualized Care Needs: Keep patient and daughter updated on Plan of Care     Problem: Bleeding (Shoulder Arthroplasty)  Goal: Absence of Bleeding  Outcome: Ongoing, Progressing  Intervention: Monitor and Manage Bleeding  Flowsheets (Taken 10/4/2023 0442)  Bleeding Management: dressing monitored     Problem: Pain (Shoulder Arthroplasty)  Goal: Acceptable Pain Control  Outcome: Ongoing, Progressing  Intervention: Prevent or Manage Pain  Flowsheets (Taken 10/4/2023 0442)  Pain Management Interventions:   care clustered   cold applied   diversional activity provided   medication offered   pain management plan reviewed with patient/caregiver   quiet environment facilitated   relaxation techniques promoted     Problem: Postoperative Nausea and Vomiting (Shoulder Arthroplasty)  Goal: Nausea and Vomiting Relief  Outcome: Ongoing, Progressing  Intervention: Prevent or Manage Nausea and Vomiting  Flowsheets (Taken 10/4/2023 0442)  Nausea/Vomiting Interventions:   aromatherapy utilized   cool cloth applied   nausea triggers minimized     Problem: Fall Injury Risk  Goal: Absence of Fall and Fall-Related Injury  Outcome: Ongoing, Progressing  Intervention: Identify and Manage Contributors  Flowsheets (Taken 10/4/2023 0444)  Self-Care Promotion: BADL personal objects within reach  Medication Review/Management:   medications reviewed   high-risk medications identified  Intervention: Promote Injury-Free Environment  Flowsheets (Taken 10/4/2023 0444)  Safety Promotion/Fall Prevention:   assistive device/personal item within reach   Fall Risk reviewed with patient/family   family to remain at bedside    high risk medications identified   medications reviewed   nonskid shoes/socks when out of bed   side rails raised x 2   instructed to call staff for mobility

## 2023-10-04 NOTE — NURSING
Pt dressed, education complete, VSS.   Pt and daughter awaiting arrival of their ride home with family.

## 2023-10-04 NOTE — PLAN OF CARE
Problem: Adult Inpatient Plan of Care  Goal: Plan of Care Review  Outcome: Adequate for Care Transition  Flowsheets (Taken 10/4/2023 1007)  Plan of Care Reviewed With:   patient   daughter     Problem: Adult Inpatient Plan of Care  Goal: Patient-Specific Goal (Individualized)  Outcome: Adequate for Care Transition  Flowsheets (Taken 10/4/2023 1007)  Individualized Care Needs: discharge planning     Problem: Bleeding (Shoulder Arthroplasty)  Goal: Absence of Bleeding  Intervention: Monitor and Manage Bleeding  Flowsheets (Taken 10/4/2023 1007)  Bleeding Management:   affected area elevated   dressing monitored     Problem: Fluid and Electrolyte Imbalance (Shoulder Arthroplasty)  Goal: Fluid and Electrolyte Balance  Intervention: Monitor and Manage Fluid and Electrolyte Balance  Flowsheets (Taken 10/4/2023 1007)  Fluid/Electrolyte Management: oral rehydration therapy initiated     Problem: Pain (Shoulder Arthroplasty)  Goal: Acceptable Pain Control  Intervention: Prevent or Manage Pain  Flowsheets (Taken 10/4/2023 1007)  Pain Management Interventions:   pain management plan reviewed with patient/caregiver   pillow support provided   position adjusted   premedicated for activity   prescribed exercises encouraged   quiet environment facilitated   relaxation techniques promoted     Problem: Postoperative Nausea and Vomiting (Shoulder Arthroplasty)  Goal: Nausea and Vomiting Relief  Intervention: Prevent or Manage Nausea and Vomiting  Flowsheets (Taken 10/4/2023 1007)  Nausea/Vomiting Interventions:   slow deep breathing encouraged   stimuli minimized     Problem: Postoperative Urinary Retention (Shoulder Arthroplasty)  Goal: Effective Urinary Elimination  Intervention: Monitor and Manage Urinary Retention  Flowsheets (Taken 10/4/2023 1007)  Urinary Elimination Promotion:   frequent voiding encouraged   voiding relaxation promoted     Problem: Fall Injury Risk  Goal: Absence of Fall and Fall-Related  Injury  Intervention: Promote Injury-Free Environment  Flowsheets (Taken 10/4/2023 1007)  Safety Promotion/Fall Prevention:   assistive device/personal item within reach   diversional activities provided   Fall Risk reviewed with patient/family   in recliner, wheels locked   lighting adjusted   medications reviewed   muscle strengthening facilitated   nonskid shoes/socks when out of bed   side rails raised x 2   instructed to call staff for mobility   Plan of care reviewed with patient; verbalized understanding.   Medications reviewed and administered as ordered.  Rounding for safety and patient care per policy.   Safety precautions maintained. Patient working appropriately with PT/OT.  DME at bedside.  Call light within reach, bed wheels locked, bed in lowest position, side rails ^x2, safety maintained. NADN, Adequate for discharge.

## 2023-10-04 NOTE — DISCHARGE SUMMARY
"[facility])  Orthopedics  Discharge Summary      Patient Name: Holly Julian  MRN: [id number]  Admission Date: 10/3/2023  Hospital Length of Stay: 0 days  Discharge Date and Time:  10/04/2023 10:10 AM  Attending Physician: Yessi Hahn MD   Discharging Provider: Vladislav Hutchins PA-C  Primary Care Provider: Ruddy Ho MD    HPI: Patient had right RCR with no issue but was having uncontrolled nausea and vomiting post-op. Patients daughter did not feel comfortable taking her home and requested for over night admit. She did not have any issues over night. Tolerated liquids and food with no issues. Pain is very well controlled. Patient is requesting discharge home.     Procedure(s) (LRB):  REPAIR, ROTATOR CUFF, ARTHROSCOPIC WITH CUFF MEND (Right)  EXTENSIVE DEBRIDEMENT, SHOULDER, ARTHROSCOPIC (Right)  EXCISION, CLAVICLE, DISTAL (Right)  ARTHROSCOPY,SHOULDER,WITH BICEPS TENODESIS (Right)  ARTHROSCOPY, SHOULDER, WITH SUBACROMIAL  DECOMPRESSION/ BURSECTOMY (Right)  ARTHROSCOPY, SHOULDER WITH L;ABRAL DEBRIDEMENT (Right)  LYSIS, ADHESIONS, SHOULDER, ARTHROSCOPIC (Right)      Hospital Course: She did not have any issues over night. Tolerated liquids and food with no issues. Pain is very well controlled. Patient is requesting discharge home.           Pending Diagnostic Studies:       None          Final Active Diagnoses:    Diagnosis Date Noted POA    Nontraumatic rotator cuff tear, right [M75.101] 10/03/2023 Yes      Problems Resolved During this Admission:      Discharged Condition: good    Disposition: Home or Self Care    Follow Up: in clinic in 2 weeks.     Patient Instructions:      SLING FOR HOME USE   Order Comments: Post-op sling     Order Specific Question Answer Comments   Height: 4' 11" (1.499 m)    Weight: 93.4 kg (206 lb)    Does patient have medical equipment at home? none    Length of need (1-99 months): 12      Diet general     Call MD for:  temperature >100.4     Call MD for:  " persistent nausea and vomiting     Call MD for:  severe uncontrolled pain     Call MD for:  difficulty breathing, headache or visual disturbances     Call MD for:  redness, tenderness, or signs of infection (pain, swelling, redness, odor or green/yellow discharge around incision site)     Call MD for:  hives     Call MD for:  persistent dizziness or light-headedness     Ice to affected area   Order Comments: using barrier between ice and skin (specify duration&frequency)     No driving, operating heavy equipment or signing legal documents while taking pain medication     Remove dressing in 72 hours     Shower on day dressing removed (No bath)     Medications:  Reconciled Home Medications:      Medication List        CONTINUE taking these medications      amLODIPine 5 MG tablet  Commonly known as: NORVASC  Take 1 tablet (5 mg total) by mouth once daily.     aspirin 81 MG EC tablet  Commonly known as: ECOTRIN  Take 1 tablet (81 mg total) by mouth once daily. For 4 weeks starting the day after surgery.     docusate sodium 100 MG capsule  Commonly known as: COLACE  Take 1 capsule (100 mg total) by mouth 2 (two) times daily as needed for Constipation.     oxyCODONE-acetaminophen  mg per tablet  Commonly known as: PERCOCET  Take 1 tablet by mouth every 4-6 hours as needed for pain. Take stool softener with this medication.     promethazine 25 MG tablet  Commonly known as: PHENERGAN  Take 1 tablet (25 mg total) by mouth every 6 (six) hours as needed for Nausea.     traMADoL 50 mg tablet  Commonly known as: ULTRAM  Take 1-2 tablets ( mg total) by mouth every 6 (six) hours as needed for Pain.              Vladislav Hutchins PA-C  Orthopedics  [facility]

## 2023-10-05 NOTE — PLAN OF CARE
[place] [facility])  Discharge Final Note    Primary Care Provider: Ruddy Ho MD    Expected Discharge Date: 10/3/2023    Final Discharge Note (most recent)       Final Note - 10/05/23 1343          Final Note    Assessment Type Final Discharge Note     Anticipated Discharge Disposition Home or Self Care     What phone number can be called within the next 1-3 days to see how you are doing after discharge? --   [phone]                    Important Message from Medicare      Future Appointments   Date Time Provider Department Center   10/18/2023  9:45 AM Yessi Hahn MD [facility] SP[facility]TS [place]   10/31/2023 10:00 AM Solis Davison, PT [facility] OP [facility] [place]   11/15/2023  9:45 AM Yessi Hahn MD [facility]   1/18/2024  8:20 AM Ruddy Ho MD [facility]     Patient discharged home to care of family on 10/4/23.      Bertha Posadas RNCM  Case Management  [facility]  [phone]

## 2023-10-18 ENCOUNTER — OFFICE VISIT (OUTPATIENT)
Dept: SPORTS MEDICINE | Facility: CLINIC | Age: 53
End: 2023-10-18
Payer: COMMERCIAL

## 2023-10-18 VITALS
HEIGHT: 59 IN | DIASTOLIC BLOOD PRESSURE: 89 MMHG | BODY MASS INDEX: 43.77 KG/M2 | RESPIRATION RATE: 18 BRPM | SYSTOLIC BLOOD PRESSURE: 142 MMHG | WEIGHT: 217.13 LBS | TEMPERATURE: 98 F | HEART RATE: 88 BPM

## 2023-10-18 DIAGNOSIS — Z98.890 S/P RIGHT ROTATOR CUFF REPAIR: Primary | ICD-10-CM

## 2023-10-18 PROCEDURE — 3044F HG A1C LEVEL LT 7.0%: CPT | Mod: CPTII,S$GLB,, | Performed by: ORTHOPAEDIC SURGERY

## 2023-10-18 PROCEDURE — 3077F SYST BP >= 140 MM HG: CPT | Mod: CPTII,S$GLB,, | Performed by: ORTHOPAEDIC SURGERY

## 2023-10-18 PROCEDURE — 3008F BODY MASS INDEX DOCD: CPT | Mod: CPTII,S$GLB,, | Performed by: ORTHOPAEDIC SURGERY

## 2023-10-18 PROCEDURE — 3077F PR MOST RECENT SYSTOLIC BLOOD PRESSURE >= 140 MM HG: ICD-10-PCS | Mod: CPTII,S$GLB,, | Performed by: ORTHOPAEDIC SURGERY

## 2023-10-18 PROCEDURE — 3079F DIAST BP 80-89 MM HG: CPT | Mod: CPTII,S$GLB,, | Performed by: ORTHOPAEDIC SURGERY

## 2023-10-18 PROCEDURE — 99024 POSTOP FOLLOW-UP VISIT: CPT | Mod: S$GLB,,, | Performed by: ORTHOPAEDIC SURGERY

## 2023-10-18 PROCEDURE — 99999 PR PBB SHADOW E&M-EST. PATIENT-LVL III: CPT | Mod: PBBFAC,,, | Performed by: ORTHOPAEDIC SURGERY

## 2023-10-18 PROCEDURE — 3079F PR MOST RECENT DIASTOLIC BLOOD PRESSURE 80-89 MM HG: ICD-10-PCS | Mod: CPTII,S$GLB,, | Performed by: ORTHOPAEDIC SURGERY

## 2023-10-18 PROCEDURE — 3008F PR BODY MASS INDEX (BMI) DOCUMENTED: ICD-10-PCS | Mod: CPTII,S$GLB,, | Performed by: ORTHOPAEDIC SURGERY

## 2023-10-18 PROCEDURE — 3044F PR MOST RECENT HEMOGLOBIN A1C LEVEL <7.0%: ICD-10-PCS | Mod: CPTII,S$GLB,, | Performed by: ORTHOPAEDIC SURGERY

## 2023-10-18 PROCEDURE — 99024 PR POST-OP FOLLOW-UP VISIT: ICD-10-PCS | Mod: S$GLB,,, | Performed by: ORTHOPAEDIC SURGERY

## 2023-10-18 PROCEDURE — 99999 PR PBB SHADOW E&M-EST. PATIENT-LVL III: ICD-10-PCS | Mod: PBBFAC,,, | Performed by: ORTHOPAEDIC SURGERY

## 2023-10-18 NOTE — PROGRESS NOTES
Chief Complaint: right shoulder pain    HISTORY OF PRESENT ILLNESS:   Pt is here today for post-operative followup of shoulder arthroscopy.  she is doing well.  We have reviewed patient's findings and discussed plan of care and future treatment options.        VAS 7/10    DATE OF PROCEDURE: 10/03/2023  right  1. Shoulder arthroscopic rotator cuff repair (CPT 85510) with CuffMend ()  2. Shoulder arthroscopic biceps tenodesis (CPT 54419)  3. Shoulder arthroscopic subacromial decompression, bursectomy   4. Shoulder arthroscopic extensive debridement (anterior, posterior glenohumeral joint, subacromial space) (CPT 75808)  5. Shoulder arthroscopic labral debridement (CPT 68513)  6. Shoulder arthroscopic lysis of adhesions (CPT 50994)  Shoulder arthroscopic distal clavicle excision (CPT 18798)       Size of tear: 10 x 15 mm, 60% partial thickness bursal    Tolerating pain well.   Wearing sling which is in place.                                                                               PHYSICAL EXAMINATION:     Incision sites healed well  No evidence of any erythema, infection or induration  elbow Range of motion full   Minimal effusion  2+ Radial pulses  No swelling                                                                               ASSESSMENT:                                                                                                                                               1. Status post above, doing well.                                                                                                                               PLAN:                                                                                                                                                     1. PT; wean narcotics  2. Emphasized scapular function.  3. I have discussed return to activity in detail.  4. Patient will see us back in 4 weeks.                                      5. Discussed case with  PT.    All questions were answered, surgical technique was reviewed and interpreted, and patient should contact us with any questions or concerns in the interim.

## 2023-10-19 ENCOUNTER — CLINICAL SUPPORT (OUTPATIENT)
Dept: REHABILITATION | Facility: HOSPITAL | Age: 53
End: 2023-10-19
Payer: OTHER MISCELLANEOUS

## 2023-10-19 DIAGNOSIS — Z98.890 S/P RIGHT ROTATOR CUFF REPAIR: ICD-10-CM

## 2023-10-19 DIAGNOSIS — M25.619 LIMITED RANGE OF MOTION (ROM) OF SHOULDER: ICD-10-CM

## 2023-10-19 PROBLEM — G89.29 CHRONIC RIGHT SHOULDER PAIN: Status: RESOLVED | Noted: 2023-05-17 | Resolved: 2023-10-19

## 2023-10-19 PROBLEM — M62.81 MUSCLE WEAKNESS OF UPPER EXTREMITY: Status: RESOLVED | Noted: 2022-07-14 | Resolved: 2023-10-19

## 2023-10-19 PROBLEM — R29.3 POOR POSTURE: Status: RESOLVED | Noted: 2023-05-17 | Resolved: 2023-10-19

## 2023-10-19 PROBLEM — M25.611 DECREASED RIGHT SHOULDER RANGE OF MOTION: Status: RESOLVED | Noted: 2022-07-14 | Resolved: 2023-10-19

## 2023-10-19 PROBLEM — M25.511 CHRONIC RIGHT SHOULDER PAIN: Status: RESOLVED | Noted: 2023-05-17 | Resolved: 2023-10-19

## 2023-10-19 PROCEDURE — 97140 MANUAL THERAPY 1/> REGIONS: CPT | Performed by: PHYSICAL THERAPIST

## 2023-10-19 PROCEDURE — 97161 PT EVAL LOW COMPLEX 20 MIN: CPT | Performed by: PHYSICAL THERAPIST

## 2023-10-19 PROCEDURE — 97112 NEUROMUSCULAR REEDUCATION: CPT | Performed by: PHYSICAL THERAPIST

## 2023-10-19 NOTE — PLAN OF CARE
[facility]   Physical Therapy Initial Evaluation      Name: Holly HEREDIA [facility] Number: [id number]    Therapy Diagnosis:   Encounter Diagnoses   Name Primary?    S/P right rotator cuff repair     Limited range of motion (ROM) of shoulder         Physician: Yessi Hahn MD    Physician Orders: PT Eval and Treat   Medical Diagnosis from Referral: M75.101 (ICD-10-CM) - Nontraumatic rotator cuff tear, right  Evaluation Date: 10/19/2023  Authorization Period Expiration: 9/24/2024  Plan of Care Expiration: 2/19/2024  Progress Note Due: 4/19/2024  Visit # / Visits authorized: 1/ 1   FOTO: 1/1    Precautions: Standard     Time In: 1000  Time Out: 1055  Total Appointment Time (timed & untimed codes): 55 minutes    POSTOPERATIVE PLAN: We will follow the arthroscopic rotator cuff repair guidelines for a small size rotator cuff tear.  We discussed with the patient's family after surgery.  The patient will remain in a sling for 6 weeks.  PT to start at 1-2 weeks.     Cuff specific program:  Pendulum exercises and Codman's exercises in 5-7 days, protecting rotator cuff repair for 1 week by avoiding active motion program until 1 week.     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 She tried therapy but did not get any relief, and then got an injection. She ultimately needed to get surgery to repair her torn rotator cuff 2 weeks ago. She presents today in a sling without abduction pillow, just knows she was throwing up after surgery but is unsure if she ever got a pillow for the sling. She will ask her family tonight. She  is still sleeping in the recliner and is just finishing the pain medication. She enjoys going for long walks but has not been walking since her injury. She is nervous to return to work in the future.   Falls: none reported    Imaging: MRI studies:     Impression:     Small partial-thickness rotator cuff tear with tendinosis, involving the infraspinatus tendon, as above.     Moderate cartilage loss along the superior glenoid, as above.     Intra-articular biceps tendinosis.     Mild AC joint arthropathy.     Mild sub deltoid fluid, possible bursitis.    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 [employer]     Medical History:   Past Medical History:   Diagnosis Date    Essential (primary) hypertension        Surgical History:   Holly Julian  has a past surgical history that includes  section (2001); Arthroscopic repair of rotator cuff of shoulder (Right, 10/3/2023); Arthroscopic debridement of shoulder (Right, 10/3/2023); Distal clavicle excision (Right, 10/3/2023); arthroscopy,shoulder,with biceps tenodesis (Right, 10/3/2023); Arthroscopy of shoulder with decompression of subacromial space (Right, 10/3/2023); Shoulder arthroscopy (Right, 10/3/2023); and lysis, adhesions, shoulder, arthroscopic (Right, 10/3/2023).    Medications:   Holly has a current medication list which includes the following prescription(s): amlodipine, aspirin, docusate sodium, oxycodone-acetaminophen, promethazine, and tramadol.    Allergies:   Review of patient's allergies  indicates:  No Known Allergies     Objective      Observation: Patient is a 53 y.o. female alert and oriented    Posture: Right shoulder protracted, pt in shoulder sling with no pillow. Noted she may have been given a pillow but she is unsure it's location, will ask her aunt.    Passive Range of Motion:   Shoulder Right Shoulder   Flexion 60   Abduction nt   ER at 20 0   IR nt      -5 degrees of elbow extension    Active Range of Motion:   Shoulder Right Left   Flexion nt 180   Abduction nt 180   ER at 0 nt 50   ER at 90 nt 90   IR (behind back) nt 70     Upper Extremity Strength:  Formal MMT not performed 2/2 POD#16 and increased pain.      Joint Mobility: Right shoulder as expected post-op.Slight limitation with elbow extension due to soft tissue limitation and some swelling into the biceps.    Palpation:  Right shoulder as expected post-op.    Sensation: Intact but diminished 2/2 residual nerve block.     Intake Outcome Measure for FOTO Shoulder Survey    Therapist reviewed FOTO scores for Holly Julian on 10/19/2023.   FOTO documents entered into [employer] - see Media section.    Intake Score: 22%       Treatment     Total Treatment time (time-based codes) separate from Evaluation: 20 minutes     Holly received the treatments listed below:      manual therapy techniques: Joint mobilizations were applied to the:Right shoulder for 10 minutes, including:  Gr I-II oscillations R shoulder pain relief  Gr II flexion pain free range  PROM elbow extension  Gr II ER to neutral arm by side    neuromuscular re-education activities to improve: Sense, Proprioception, and Posture for 10 minutes. The following activities were included:  PROM elbow flexion/extension 30x  Sling refit with proper posture  Pt education on HEP - scap squeeze and shrugs    Patient Education and Home Exercises     Education provided:   - sling for 6 weeks  - ROM expectations  - Healing timelines  - S/S of infection and DVT  - Lifting limitations      Written Home Exercises Provided: yes. Exercises were reviewed and Holly was able to demonstrate them prior to the end of the session.  Holly demonstrated good understanding of the education provided. See EMR under Patient Instructions for exercises provided during therapy sessions.    Assessment     Holly is a 53 y.o. female referred to outpatient Physical Therapy with a medical diagnosis of right rotator cuff repair. Patient presents with limited shoulder ROM, strength deficits, in a sling at all times, trouble sleeping, pain with ADLs and unable to work at this time as a     Patient prognosis is Good.   Patient will benefit from skilled outpatient Physical Therapy to address the deficits stated above and in the chart below, provide patient /family education, and to maximize patientt's level of independence.     Plan of care discussed with patient: Yes  Patient's spiritual, cultural and educational needs considered and patient is agreeable to the plan of care and goals as stated below:     Anticipated Barriers for therapy: healing timelines    Medical Necessity is demonstrated by the following  History  Co-morbidities and personal factors that may impact the plan of care [x] LOW: no personal factors / co-morbidities  [] MODERATE: 1-2 personal factors / co-morbidities  [] HIGH: 3+ personal factors / co-morbidities    Moderate / High Support Documentation:   Co-morbidities affecting plan of care:     Personal Factors:   no deficits     Examination  Body Structures and Functions, activity limitations and participation restrictions that may impact the plan of care [x] LOW: addressing 1-2 elements  [] MODERATE: 3+ elements  [] HIGH: 4+ elements (please support below)    Moderate / High Support Documentation:      Clinical Presentation [x] LOW: stable  [] MODERATE: Evolving  [] HIGH: Unstable     Decision Making/ Complexity Score: Low       GOALS: Short Term Goals:  6 weeks  1.Report decreased  shoulder pain < / =  2/10  to increase tolerance for return to ADLs out the sling  2. Increase PROM 120 flexion and 25 ER to restore a more functional ROM   3. Increased strength by 1/3 MMT grade in R shoulder scap stabilizers to increase tolerance for ADL and work activities.  4. Pt to tolerate HEP to improve ROM and independence with ADL's    Long Term Goals: 24 weeks  1.Report decreased shoulder pain  < / =  0 /10  to increase tolerance for return to bus driving  2.Increase AROM to full motion without pain to return to work safely  3.Increase strength to >/= 4/5 in cuff to increase tolerance for ADL and work activities.  4. Pt goal: return to driving the bus at [employer]  5. Pt will have improved gcode of CJ (20-40% limited) on FOTO shoulder in order to demonstrate true functional improvement.   Plan     Plan of care Certification: 10/19/2023 to 2/19/2024.    Outpatient Physical Therapy 2 times weekly for 10 weeks to include the following interventions: Manual Therapy, Moist Heat/ Ice, Neuromuscular Re-ed, Patient Education, Self Care, Therapeutic Activities, and Therapeutic Exercise.     Nasir De León, PT, DPT, OCS, FAAOMPT

## 2023-10-25 ENCOUNTER — CLINICAL SUPPORT (OUTPATIENT)
Dept: REHABILITATION | Facility: HOSPITAL | Age: 53
End: 2023-10-25
Payer: OTHER MISCELLANEOUS

## 2023-10-25 DIAGNOSIS — M25.511 ACUTE PAIN OF RIGHT SHOULDER: Primary | ICD-10-CM

## 2023-10-25 DIAGNOSIS — M25.619 LIMITED RANGE OF MOTION (ROM) OF SHOULDER: ICD-10-CM

## 2023-10-25 PROCEDURE — 97140 MANUAL THERAPY 1/> REGIONS: CPT | Mod: CQ

## 2023-10-25 PROCEDURE — 97110 THERAPEUTIC EXERCISES: CPT | Mod: CQ

## 2023-10-25 NOTE — PROGRESS NOTES
Physical Therapy Daily Treatment Note     Name: Holly HEREDIA [facility] Number: [id number]  Therapy Diagnosis:        Encounter Diagnoses   Name Primary?    S/P right rotator cuff repair      Limited range of motion (ROM) of shoulder          Physician: Yessi Hahn MD     Physician Orders: PT Eval and Treat   Medical Diagnosis from Referral: M75.101 (ICD-10-CM) - Nontraumatic rotator cuff tear, right  Evaluation Date: 10/19/2023  Authorization Period Expiration: 9/24/2024  Plan of Care Expiration: 2/19/2024  Progress Note Due: 4/19/2024  Visit # / Visits authorized: 1/ 1   FOTO: 1/1     Precautions: Standard      Time In: 1000  Time Out: 1100  Total Appointment Time (timed & untimed codes): 60 minutes     POSTOPERATIVE PLAN: We will follow the arthroscopic rotator cuff repair guidelines for a small size rotator cuff tear.  We discussed with the patient's family after surgery.  The patient will remain in a sling for 6 weeks.  PT to start at 1-2 weeks.     Cuff specific program:  Pendulum exercises and Codman's exercises in 5-7 days, protecting rotator cuff repair for 1 week by avoiding active motion program until 1 week.     PASSIVE ROM: ER side 30 degrees, Forward Flex 90 degrees, ABD - 60 degrees   Full AAROM/PROM starting at 5-7 days as tolerated         Quality of tissue: fair      Quality of the repair: good       Size of tear: 10 x 15 mm, 60% partial thickness bursal     Subjective     Pt reports: pain in shld this morning taking tylenol prior to tx. .  She was compliant with home exercise program.  Response to previous treatment: felt about the same   Functional change: no change - sling maci    Pain: 8/10  Location: right shoulder      Objective     Holly received therapeutic exercises to develop strength, endurance, ROM, flexibility, posture, and core stabilization for 25 minutes including:  Moist heat R shld for 10' performing wrist 4 ways - grn ball  Seated PROM table slide flex 30x    Seated PROM table  slide scap 30x   Shld shrugs 30x   Scapular squeeze 30x   PROM elbow flexion/extension 30x  Sling refit with proper posture  Pt education on HEP - scap squeeze and shrugs    Holly received the following manual therapy techniques: Joint mobilizations, Manual traction, Myofacial release, Manual Lymphatic Drainage, Soft tissue Mobilization, and Friction Massage were applied to the: R shld  for 25 minutes, including:  oscillations R shoulder pain relief  Scapular mobs   lexion pain free range  PROM elbow extension  ER to neutral arm by side       Holly participated in neuromuscular re-education activities to improve: Balance, Coordination, Kinesthetic, Sense, Proprioception, and Posture for  minutes. The following activities were included:      Holly participated in dynamic functional therapeutic activities to improve functional performance for   minutes, including:    RICE R shld 10' after tx.     Home Exercises Provided and Patient Education Provided     Education provided:   - Compliance with HEP     Written Home Exercises Provided: yes.  Exercises were reviewed and Holly was able to demonstrate them prior to the end of the session.  Holly demonstrated good  understanding of the education provided.       Assessment   Pt tolerating tx fair. Min increased shld discomfort with activity VC/TC for correcting form/technique with no active movement and pain free range. Good PROM per protocol. Continue to progress per protocol.     Holly Is progressing well towards her goals.   Pt prognosis is Good.     Pt will continue to benefit from skilled outpatient physical therapy to address the deficits listed in the problem list box on initial evaluation, provide pt/family education and to maximize pt's level of independence in the home and community environment.     Pt's spiritual, cultural and educational needs considered and pt agreeable to plan of care and goals.    Anticipated barriers to physical therapy: none     Goals:  GOALS: Short Term Goals:  6 weeks  1.Report decreased shoulder pain < / =  2/10  to increase tolerance for return to ADLs out the sling  2. Increase PROM 120 flexion and 25 ER to restore a more functional ROM   3. Increased strength by 1/3 MMT grade in R shoulder scap stabilizers to increase tolerance for ADL and work activities.  4. 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  5. Pt will have improved gcode of CJ (20-40% limited) on FOTO shoulder in order to demonstrate true functional improvement.     Plan     Continue with POC     Judd Freitas, PTA, STS

## 2023-11-08 ENCOUNTER — CLINICAL SUPPORT (OUTPATIENT)
Dept: REHABILITATION | Facility: HOSPITAL | Age: 53
End: 2023-11-08
Payer: OTHER MISCELLANEOUS

## 2023-11-08 DIAGNOSIS — M25.511 ACUTE PAIN OF RIGHT SHOULDER: Primary | ICD-10-CM

## 2023-11-08 DIAGNOSIS — M25.619 LIMITED RANGE OF MOTION (ROM) OF SHOULDER: ICD-10-CM

## 2023-11-08 PROCEDURE — 97110 THERAPEUTIC EXERCISES: CPT | Performed by: PHYSICAL THERAPIST

## 2023-11-08 PROCEDURE — 97140 MANUAL THERAPY 1/> REGIONS: CPT | Performed by: PHYSICAL THERAPIST

## 2023-11-08 NOTE — PROGRESS NOTES
Physical Therapy Daily Treatment Note     Name: Holly HEREDIA [facility] Number: [id number]  Therapy Diagnosis:        Encounter Diagnoses   Name Primary?    S/P right rotator cuff repair      Limited range of motion (ROM) of shoulder          Physician: Yessi Hahn MD     Physician Orders: PT Eval and Treat   Medical Diagnosis from Referral: M75.101 (ICD-10-CM) - Nontraumatic rotator cuff tear, right  Evaluation Date: 10/19/2023  Authorization Period Expiration: 9/24/2024  Plan of Care Expiration: 2/19/2024  Progress Note Due: 4/19/2024  Visit # / Visits authorized: 2/12  FOTO: 1/1     Precautions: Standard      Time In: 1000  Time Out: 1100  Total Appointment Time (timed & untimed codes): 30 minutes     POSTOPERATIVE PLAN: We will follow the arthroscopic rotator cuff repair guidelines for a small size rotator cuff tear.  We discussed with the patient's family after surgery.  The patient will remain in a sling for 6 weeks.  PT to start at 1-2 weeks.     Cuff specific program:  Pendulum exercises and Codman's exercises in 5-7 days, protecting rotator cuff repair for 1 week by avoiding active motion program until 1 week.     PASSIVE ROM: ER side 30 degrees, Forward Flex 90 degrees, ABD - 60 degrees   Full AAROM/PROM starting at 5-7 days as tolerated         Quality of tissue: fair      Quality of the repair: good       Size of tear: 10 x 15 mm, 60% partial thickness bursal     Subjective     Pt reports: I missed last week due to getting my visits covered. Notes the shoulder is doing well  She was compliant with home exercise program.  Response to previous treatment: felt about the same   Functional change: no change - sling maci    Pain: 8/10  Location: right shoulder      Objective     Holly received therapeutic exercises to develop strength, endurance, ROM, flexibility, posture, and core stabilization for 25 minutes including:  Moist heat R shld for 10' performing wrist 4 ways - 1# weight  Seated PROM table slide  flex 4'   Seated PROM table slide scap 4'   Shld shrugs 30x   Scapular squeeze 30x   PROM elbow flexion/extension 30x  Sling refit with proper posture  Pt education on HEP - scap squeeze and shrugs    Holly received the following manual therapy techniques: Joint mobilizations, Manual traction, Myofacial release, Manual Lymphatic Drainage, Soft tissue Mobilization, and Friction Massage were applied to the: R shld  for 25 minutes, including:  oscillations R shoulder pain relief  Scapular mobs   Flexion pain free range  PROM elbow extension  ER 10 deg arm by side       Holly participated in neuromuscular re-education activities to improve: Balance, Coordination, Kinesthetic, Sense, Proprioception, and Posture for  minutes. The following activities were included:      Holly participated in dynamic functional therapeutic activities to improve functional performance for   minutes, including:    RICE R shld 0' after tx.     Home Exercises Provided and Patient Education Provided     Education provided:   - Compliance with HEP     Written Home Exercises Provided: yes.  Exercises were reviewed and Holly was able to demonstrate them prior to the end of the session.  Holly demonstrated good  understanding of the education provided.       Assessment   Patient presented with sling no abd pillow to PT. She has excellent PROM to 90 deg as expected at this stage, no stiffness noted from not attending last week. She is progressing well per protocol will being AAROM when coming out of the sling    Holly Is progressing well towards her goals.   Pt prognosis is Good.     Pt will continue to benefit from skilled outpatient physical therapy to address the deficits listed in the problem list box on initial evaluation, provide pt/family education and to maximize pt's level of independence in the home and community environment.     Pt's spiritual, cultural and educational needs considered and pt agreeable to plan of care and  goals.    Anticipated barriers to physical therapy: none     Goals: GOALS: Short Term Goals:  6 weeks  1.Report decreased shoulder pain < / =  2/10  to increase tolerance for return to ADLs out the sling(Progressing, not met)  2. Increase PROM 120 flexion and 25 ER to restore a more functional ROM (Progressing, not met)  3. Increased strength by 1/3 MMT grade in R shoulder scap stabilizers to increase tolerance for ADL and work activities.(Progressing, not met)  4. 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 Pt will have improved gcode of CJ (20-40% limited) on FOTO shoulder in order to demonstrate true functional improvement. (Progressing, not met)    Plan     Continue with POC to restore PROM      Nasir De León, PT, DPT

## 2023-11-15 ENCOUNTER — CLINICAL SUPPORT (OUTPATIENT)
Dept: REHABILITATION | Facility: HOSPITAL | Age: 53
End: 2023-11-15
Payer: OTHER MISCELLANEOUS

## 2023-11-15 ENCOUNTER — OFFICE VISIT (OUTPATIENT)
Dept: SPORTS MEDICINE | Facility: CLINIC | Age: 53
End: 2023-11-15
Payer: COMMERCIAL

## 2023-11-15 VITALS
BODY MASS INDEX: 43.77 KG/M2 | DIASTOLIC BLOOD PRESSURE: 89 MMHG | WEIGHT: 217.13 LBS | HEIGHT: 59 IN | HEART RATE: 88 BPM | SYSTOLIC BLOOD PRESSURE: 142 MMHG

## 2023-11-15 DIAGNOSIS — M25.511 ACUTE PAIN OF RIGHT SHOULDER: Primary | ICD-10-CM

## 2023-11-15 DIAGNOSIS — Z98.890 S/P RIGHT ROTATOR CUFF REPAIR: Primary | ICD-10-CM

## 2023-11-15 DIAGNOSIS — M25.619 LIMITED RANGE OF MOTION (ROM) OF SHOULDER: ICD-10-CM

## 2023-11-15 PROCEDURE — 99999 PR PBB SHADOW E&M-EST. PATIENT-LVL III: CPT | Mod: PBBFAC,,, | Performed by: ORTHOPAEDIC SURGERY

## 2023-11-15 PROCEDURE — 99999 PR PBB SHADOW E&M-EST. PATIENT-LVL III: ICD-10-PCS | Mod: PBBFAC,,, | Performed by: ORTHOPAEDIC SURGERY

## 2023-11-15 PROCEDURE — 99024 PR POST-OP FOLLOW-UP VISIT: ICD-10-PCS | Mod: S$GLB,,, | Performed by: ORTHOPAEDIC SURGERY

## 2023-11-15 PROCEDURE — 97112 NEUROMUSCULAR REEDUCATION: CPT | Performed by: PHYSICAL THERAPIST

## 2023-11-15 PROCEDURE — 3077F SYST BP >= 140 MM HG: CPT | Mod: CPTII,S$GLB,, | Performed by: ORTHOPAEDIC SURGERY

## 2023-11-15 PROCEDURE — 3044F PR MOST RECENT HEMOGLOBIN A1C LEVEL <7.0%: ICD-10-PCS | Mod: CPTII,S$GLB,, | Performed by: ORTHOPAEDIC SURGERY

## 2023-11-15 PROCEDURE — 97530 THERAPEUTIC ACTIVITIES: CPT | Performed by: PHYSICAL THERAPIST

## 2023-11-15 PROCEDURE — 3077F PR MOST RECENT SYSTOLIC BLOOD PRESSURE >= 140 MM HG: ICD-10-PCS | Mod: CPTII,S$GLB,, | Performed by: ORTHOPAEDIC SURGERY

## 2023-11-15 PROCEDURE — 3044F HG A1C LEVEL LT 7.0%: CPT | Mod: CPTII,S$GLB,, | Performed by: ORTHOPAEDIC SURGERY

## 2023-11-15 PROCEDURE — 3079F PR MOST RECENT DIASTOLIC BLOOD PRESSURE 80-89 MM HG: ICD-10-PCS | Mod: CPTII,S$GLB,, | Performed by: ORTHOPAEDIC SURGERY

## 2023-11-15 PROCEDURE — 3079F DIAST BP 80-89 MM HG: CPT | Mod: CPTII,S$GLB,, | Performed by: ORTHOPAEDIC SURGERY

## 2023-11-15 PROCEDURE — 97140 MANUAL THERAPY 1/> REGIONS: CPT | Performed by: PHYSICAL THERAPIST

## 2023-11-15 PROCEDURE — 99024 POSTOP FOLLOW-UP VISIT: CPT | Mod: S$GLB,,, | Performed by: ORTHOPAEDIC SURGERY

## 2023-11-15 PROCEDURE — 97110 THERAPEUTIC EXERCISES: CPT | Performed by: PHYSICAL THERAPIST

## 2023-11-15 RX ORDER — NAPROXEN SODIUM 220 MG
220 TABLET ORAL
COMMUNITY
End: 2024-01-18

## 2023-11-15 NOTE — PROGRESS NOTES
Physical Therapy Daily Treatment Note     Name: Holly HEREDIA [facility] Number: [id number]  Therapy Diagnosis:        Encounter Diagnoses   Name Primary?    S/P right rotator cuff repair      Limited range of motion (ROM) of shoulder          Physician: Yessi Hahn MD     Physician Orders: PT Eval and Treat   Medical Diagnosis from Referral: M75.101 (ICD-10-CM) - Nontraumatic rotator cuff tear, right  Evaluation Date: 10/19/2023  Authorization Period Expiration: 9/24/2024  Plan of Care Expiration: 2/19/2024  Progress Note Due: 4/19/2024  Visit # / Visits authorized: 3/12  FOTO: 1/1     Precautions: Standard      Time In: 1100  Time Out: 1200  Total Appointment Time (timed & untimed codes): 60 minutes     POSTOPERATIVE PLAN: We will follow the arthroscopic rotator cuff repair guidelines for a small size rotator cuff tear.  We discussed with the patient's family after surgery.  The patient will remain in a sling for 6 weeks.  PT to start at 1-2 weeks.     Cuff specific program:  Pendulum exercises and Codman's exercises in 5-7 days, protecting rotator cuff repair for 1 week by avoiding active motion program until 1 week.     PASSIVE ROM: ER side 30 degrees, Forward Flex 90 degrees, ABD - 60 degrees   Full AAROM/PROM starting at 5-7 days as tolerated         Quality of tissue: fair      Quality of the repair: good       Size of tear: 10 x 15 mm, 60% partial thickness bursal     Subjective     Pt reports:  I saw the MD and he said I'm doing okay. I am out the sling and need to progress my reach  She was compliant with home exercise program.  Response to previous treatment: felt about the same   Functional change: out of the sling    Pain: 8/10  Location: right shoulder      Objective     Holly received therapeutic exercises to develop strength, endurance, ROM, flexibility, posture, and core stabilization for 8 minutes including:    Pulley flexion/scaption 4' each    Holly received the following manual therapy  "techniques: Joint mobilizations, Manual traction, Myofacial release, Manual Lymphatic Drainage, Soft tissue Mobilization, and Friction Massage were applied to the: R shld  for 23 minutes, including:  oscillations R shoulder pain relief  Scapular mobs   Flexion pain free range  PROM elbow extension  ER 10 deg arm by side       Holly participated in neuromuscular re-education activities to improve: Balance, Coordination, Kinesthetic, Sense, Proprioception, and Posture for 19 minutes. The following activities were included:    Sidelying wand flexion 3 x 10  No money YTB 2 x 10 3" hold  Slot machines 3 x 12    Holly participated in dynamic functional therapeutic activities to improve functional performance for 10  minutes, including:    Supine wand press 2 x 10  Supine wand flexion 2 x 10    RICE R shld 0' after tx.     Home Exercises Provided and Patient Education Provided     Education provided:   - Compliance with HEP     Written Home Exercises Provided: yes.  Exercises were reviewed and Holly was able to demonstrate them prior to the end of the session.  Holly demonstrated good  understanding of the education provided.       Assessment   Patient progressed to AAROM with the RUE. She was limited slot machine, appears to be fearful to move the shoulder outside [place]. PROM is excellent at this time. Will continue progress AAROM and periscapular strengthening    Holly Is progressing well towards her goals.   Pt prognosis is Good.     Pt will continue to benefit from skilled outpatient physical therapy to address the deficits listed in the problem list box on initial evaluation, provide pt/family education and to maximize pt's level of independence in the home and community environment.     Pt's spiritual, cultural and educational needs considered and pt agreeable to plan of care and goals.    Anticipated barriers to physical therapy: none     Goals: GOALS: Short Term Goals:  6 weeks  1.Report decreased shoulder pain " < / =  2/10  to increase tolerance for return to ADLs out the sling(Progressing, not met)  2. Increase PROM 120 flexion and 25 ER to restore a more functional ROM (Progressing, not met)  3. Increased strength by 1/3 MMT grade in R shoulder scap stabilizers to increase tolerance for ADL and work activities.(Progressing, not met)  4. 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 Pt will have improved gcode of CJ (20-40% limited) on FOTO shoulder in order to demonstrate true functional improvement. (Progressing, not met)    Plan     Continue with POC to restore PROM      Nasir De León, PT, DPT

## 2023-11-15 NOTE — LETTER
Patient: Holly Julian   YOB: 1970   Clinic Number: [id number]   Today's Date: November 15, 2023        Work Status Summary     Holly is a patient under my care for right shoulder surgery on 10/3/2023. She was seen in my office today for a post op appointment on 11/15/23.     Work Status: NOT ABLE to work at present.      Therapy Recomendations: Physical Therapy 1-3 times a week for 8-10 months.     Next Appointment: 1/3/2024 Holly will be seen by Dr. Yessi Hahn.                          November 15, 2023      Yessi Hahn M.D.  Signed (Provider)

## 2023-11-15 NOTE — LETTER
Patient: Holly Julian   YOB: 1970   Clinic Number: [id number]   Today's Date: November 15, 2023        Work Status Summary     Holly is a patient under my care for right shoulder surgery on 10/3/2023. She was seen in my office today for a post op appointment on 11/15/23.     Work Status: NOT ABLE to work at present. Unable to operate/drive a motor vehicle at this time. Unable to perform manual labor.      Therapy Recomendations: Physical Therapy 1-3 times a week for 8-10 months.     Next Appointment: 1/3/2024 Holly will be seen by Dr. Yessi Hahn.                          November 15, 2023      Yessi Hahn M.D.  Signed (Provider)

## 2023-11-15 NOTE — PROGRESS NOTES
Chief Complaint: right shoulder pain    HISTORY OF PRESENT ILLNESS:   Pt is here today for post-operative followup of shoulder arthroscopy.  she is doing well.  We have reviewed patient's findings and discussed plan of care and future treatment options.      Patient has been attending physical therapy at the [facility] location, working with Yenny.    Tolerating pain well.   Wearing sling which is in place.    Pain today 2/10    DATE OF PROCEDURE: 10/03/2023  right  1. Shoulder arthroscopic rotator cuff repair (CPT 83539) with [facility] ()  2. Shoulder arthroscopic biceps tenodesis (CPT 39862)  3. Shoulder arthroscopic subacromial decompression, bursectomy   4. Shoulder arthroscopic extensive debridement (anterior, posterior glenohumeral joint, subacromial space) (CPT 04715)  5. Shoulder arthroscopic labral debridement (CPT 41511)  6. Shoulder arthroscopic lysis of adhesions (CPT 31483)  7. Shoulder arthroscopic distal clavicle excision (CPT 91269)     Size of tear: 10 x 15 mm, 60% partial thickness bursal                                                                                 PHYSICAL EXAMINATION:     Incision sites healed well  No evidence of any erythema, infection or induration  elbow Range of motion full   No effusion  2+ Radial pulses  No swelling         ROM:      Forward Elevation: 60  ER: 50  IR: body    Strength  Scaption at 0 and 30: 5/5  ER: 5/5                                                                            ASSESSMENT:                                                                                                                                               1. Status post above, doing well.                                                                                                                               PLAN:                                                                                                                                                      1. Continue PT, case discussed with therapist   2. Emphasized scapular function.  3. I have discussed return to activity in detail, patient is not clear to return to work at this time, patient is unable to operate/drive a motor vehicle at this time, unable to perform manual labor.  4. Patient will see us back in 6 weeks.                                      5. All questions were answered, surgical technique was reviewed and interpreted, and patient should contact us with any questions or concerns in the interim.

## 2023-11-20 ENCOUNTER — CLINICAL SUPPORT (OUTPATIENT)
Dept: REHABILITATION | Facility: HOSPITAL | Age: 53
End: 2023-11-20
Payer: OTHER MISCELLANEOUS

## 2023-11-20 DIAGNOSIS — M25.619 LIMITED RANGE OF MOTION (ROM) OF SHOULDER: ICD-10-CM

## 2023-11-20 DIAGNOSIS — M25.511 ACUTE PAIN OF RIGHT SHOULDER: Primary | ICD-10-CM

## 2023-11-20 PROCEDURE — 97112 NEUROMUSCULAR REEDUCATION: CPT | Mod: CQ

## 2023-11-20 PROCEDURE — 97530 THERAPEUTIC ACTIVITIES: CPT | Mod: CQ

## 2023-11-20 PROCEDURE — 97110 THERAPEUTIC EXERCISES: CPT | Mod: CQ

## 2023-11-20 PROCEDURE — 97140 MANUAL THERAPY 1/> REGIONS: CPT | Mod: CQ

## 2023-11-20 NOTE — PROGRESS NOTES
Physical Therapy Daily Treatment Note     Name: Holly HEREDIA [facility] Number: [id number]  Therapy Diagnosis:        Encounter Diagnoses   Name Primary?    S/P right rotator cuff repair      Limited range of motion (ROM) of shoulder          Physician: Yessi Hahn MD     Physician Orders: PT Eval and Treat   Medical Diagnosis from Referral: M75.101 (ICD-10-CM) - Nontraumatic rotator cuff tear, right  Evaluation Date: 10/19/2023  Authorization Period Expiration: 9/24/2024  Plan of Care Expiration: 2/19/2024  Progress Note Due: 4/19/2024  Visit # / Visits authorized: 3/12  FOTO: 1/1     Precautions: Standard      Time In: 1000  Time Out: 1100  Total Appointment Time (timed & untimed codes): 60 minutes     POSTOPERATIVE PLAN: We will follow the arthroscopic rotator cuff repair guidelines for a small size rotator cuff tear.  We discussed with the patient's family after surgery.  The patient will remain in a sling for 6 weeks.  PT to start at 1-2 weeks.     Cuff specific program:  Pendulum exercises and Codman's exercises in 5-7 days, protecting rotator cuff repair for 1 week by avoiding active motion program until 1 week.     PASSIVE ROM: ER side 30 degrees, Forward Flex 90 degrees, ABD - 60 degrees   Full AAROM/PROM starting at 5-7 days as tolerated         Quality of tissue: fair      Quality of the repair: good       Size of tear: 10 x 15 mm, 60% partial thickness bursal     Subjective     Pt reports: min pinching in shld with tingling in hand. Pain at night is horrible   She was compliant with home exercise program.  Response to previous treatment: soreness  Functional change: out of the sling    Pain: 4-5/10  Location: right shoulder      Objective     Holly received therapeutic exercises to develop strength, endurance, ROM, flexibility, posture, and core stabilization for 20 minutes including:  Shld reassessment   Moist heat for 10' with red ball 4 ways   Pulley flexion/scaption 4' each    Holly received the  "following manual therapy techniques: Joint mobilizations, Manual traction, Myofacial release, Manual Lymphatic Drainage, Soft tissue Mobilization, and Friction Massage were applied to the: R shld  for 10 minutes, including:  oscillations R shoulder pain relief  Scapular mobs   Flexion pain free range  PROM elbow extension  ER 10 deg arm by side       Holly participated in neuromuscular re-education activities to improve: Balance, Coordination, Kinesthetic, Sense, Proprioception, and Posture for 20 minutes. The following activities were included:  Table slides flex 30x  Table slides scap 30x  Sidelying wand flexion 3 x 10  No money YTB 2 x 10 3" hold  Slot machines 3 x 12    Holly participated in dynamic functional therapeutic activities to improve functional performance for 10  minutes, including:  Supine wand chest  press plus 2x20  Supine wand flexion 3x10    RICE R shld 0' after tx.     Home Exercises Provided and Patient Education Provided     Education provided:   - Compliance with HEP     Written Home Exercises Provided: yes.  Exercises were reviewed and Holly was able to demonstrate them prior to the end of the session.  Holly demonstrated good  understanding of the education provided.       Assessment   Pt tolerating tx well. Continues to progress well with AAROM with the RUE. Continue with periscapular strengthening and AAROM per protocol. VC/TC for correcting form/technique.     Holly Is progressing well towards her goals.   Pt prognosis is Good.     Pt will continue to benefit from skilled outpatient physical therapy to address the deficits listed in the problem list box on initial evaluation, provide pt/family education and to maximize pt's level of independence in the home and community environment.     Pt's spiritual, cultural and educational needs considered and pt agreeable to plan of care and goals.    Anticipated barriers to physical therapy: none     Goals: GOALS: Short Term Goals:  6 " weeks  1.Report decreased shoulder pain < / =  2/10  to increase tolerance for return to ADLs out the sling(Progressing, not met)  2. Increase PROM 120 flexion and 25 ER to restore a more functional ROM (Progressing, not met)  3. Increased strength by 1/3 MMT grade in R shoulder scap stabilizers to increase tolerance for ADL and work activities.(Progressing, not met)  4. 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 Pt will have improved gcode of CJ (20-40% limited) on FOTO shoulder in order to demonstrate true functional improvement. (Progressing, not met)    Plan     Continue with POC to restore PROM      Judd Freitas, PTA, STS

## 2023-11-22 ENCOUNTER — CLINICAL SUPPORT (OUTPATIENT)
Dept: REHABILITATION | Facility: HOSPITAL | Age: 53
End: 2023-11-22
Payer: OTHER MISCELLANEOUS

## 2023-11-22 DIAGNOSIS — M25.511 ACUTE PAIN OF RIGHT SHOULDER: Primary | ICD-10-CM

## 2023-11-22 DIAGNOSIS — M25.619 LIMITED RANGE OF MOTION (ROM) OF SHOULDER: ICD-10-CM

## 2023-11-22 PROCEDURE — 97140 MANUAL THERAPY 1/> REGIONS: CPT | Performed by: PHYSICAL THERAPIST

## 2023-11-22 PROCEDURE — 97110 THERAPEUTIC EXERCISES: CPT | Performed by: PHYSICAL THERAPIST

## 2023-11-22 PROCEDURE — 97530 THERAPEUTIC ACTIVITIES: CPT | Performed by: PHYSICAL THERAPIST

## 2023-11-22 PROCEDURE — 97112 NEUROMUSCULAR REEDUCATION: CPT | Performed by: PHYSICAL THERAPIST

## 2023-11-22 NOTE — PROGRESS NOTES
Physical Therapy Daily Treatment Note     Name: Holly HEREDIA [facility] Number: [id number]  Therapy Diagnosis:        Encounter Diagnoses   Name Primary?    S/P right rotator cuff repair      Limited range of motion (ROM) of shoulder          Physician: Yessi Hahn MD     Physician Orders: PT Eval and Treat   Medical Diagnosis from Referral: M75.101 (ICD-10-CM) - Nontraumatic rotator cuff tear, right  Evaluation Date: 10/19/2023  Authorization Period Expiration: 9/24/2024  Plan of Care Expiration: 2/19/2024  Progress Note Due: 4/19/2024  Visit # / Visits authorized: 5/12  FOTO: 1/1     Precautions: Standard      Time In: 1100  Time Out: 1200  Total Appointment Time (timed & untimed codes): 60 minutes     POSTOPERATIVE PLAN: We will follow the arthroscopic rotator cuff repair guidelines for a small size rotator cuff tear.  We discussed with the patient's family after surgery.  The patient will remain in a sling for 6 weeks.  PT to start at 1-2 weeks.     Cuff specific program:  Pendulum exercises and Codman's exercises in 5-7 days, protecting rotator cuff repair for 1 week by avoiding active motion program until 1 week.     PASSIVE ROM: ER side 30 degrees, Forward Flex 90 degrees, ABD - 60 degrees   Full AAROM/PROM starting at 5-7 days as tolerated         Quality of tissue: fair      Quality of the repair: good       Size of tear: 10 x 15 mm, 60% partial thickness bursal     Subjective     Pt reports: Shoulder is doing better today. Notes she will wear her sling at [facility] this Thanksgiving  She was compliant with home exercise program.  Response to previous treatment: soreness  Functional change: out of the sling    Pain: 4-5/10  Location: right shoulder      Objective     Holly received therapeutic exercises to develop strength, endurance, ROM, flexibility, posture, and core stabilization for 20 minutes including:  Shld reassessment   Moist heat for 10' with 2#l 4 ways   Pulley flexion/scaption 4'  "each    Holly received the following manual therapy techniques: Joint mobilizations, Manual traction, Myofacial release, Manual Lymphatic Drainage, Soft tissue Mobilization, and Friction Massage were applied to the: R shld  for 10 minutes, including:  oscillations R shoulder pain relief  Scapular mobs   Flexion pain free range  PROM elbow extension  ER 10 deg arm by side       Holly participated in neuromuscular re-education activities to improve: Balance, Coordination, Kinesthetic, Sense, Proprioception, and Posture for 20 minutes. The following activities were included:  Table slides flex 30x  Table slides scap 30x  Sidelying wand flexion 3 x 10  No money OTB 2 x 10 3" hold  Slot machines 3 x 12    Holly participated in dynamic functional therapeutic activities to improve functional performance for 10  minutes, including:  Supine wand chest  press plus 2x20  Supine wand flexion 3x10  CC scaption 3# 30x  Scaptions 3 x 8    RICE R shld 0' after tx.     Home Exercises Provided and Patient Education Provided     Education provided:   - Compliance with HEP     Written Home Exercises Provided: yes.  Exercises were reviewed and Holly was able to demonstrate them prior to the end of the session.  Holly demonstrated good  understanding of the education provided.       Assessment   Progressing AAROM and stabilization to scap in clinic. Much improved PROM with manual therapy and educated on using the arm in community. Progressing AROM to shoulder height, better motion with CC and ganga.   Holly Is progressing well towards her goals.   Pt prognosis is Good.     Pt will continue to benefit from skilled outpatient physical therapy to address the deficits listed in the problem list box on initial evaluation, provide pt/family education and to maximize pt's level of independence in the home and community environment.     Pt's spiritual, cultural and educational needs considered and pt agreeable to plan of care and " goals.    Anticipated barriers to physical therapy: none     Goals: GOALS: Short Term Goals:  6 weeks  1.Report decreased shoulder pain < / =  2/10  to increase tolerance for return to ADLs out the sling(Progressing, not met)  2. Increase PROM 120 flexion and 25 ER to restore a more functional ROM (Progressing, not met)  3. Increased strength by 1/3 MMT grade in R shoulder scap stabilizers to increase tolerance for ADL and work activities.(Progressing, not met)  4. 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 Pt will have improved gcode of CJ (20-40% limited) on FOTO shoulder in order to demonstrate true functional improvement. (Progressing, not met)    Plan     Continue with POC to restore PROM, progressed MO De León, PT, DPT

## 2023-11-27 ENCOUNTER — CLINICAL SUPPORT (OUTPATIENT)
Dept: REHABILITATION | Facility: HOSPITAL | Age: 53
End: 2023-11-27
Payer: OTHER MISCELLANEOUS

## 2023-11-27 DIAGNOSIS — M25.511 ACUTE PAIN OF RIGHT SHOULDER: Primary | ICD-10-CM

## 2023-11-27 DIAGNOSIS — M25.619 LIMITED RANGE OF MOTION (ROM) OF SHOULDER: ICD-10-CM

## 2023-11-27 PROCEDURE — 97140 MANUAL THERAPY 1/> REGIONS: CPT | Performed by: PHYSICAL THERAPIST

## 2023-11-27 PROCEDURE — 97112 NEUROMUSCULAR REEDUCATION: CPT | Performed by: PHYSICAL THERAPIST

## 2023-11-27 PROCEDURE — 97110 THERAPEUTIC EXERCISES: CPT | Performed by: PHYSICAL THERAPIST

## 2023-11-27 PROCEDURE — 97530 THERAPEUTIC ACTIVITIES: CPT | Performed by: PHYSICAL THERAPIST

## 2023-11-27 NOTE — PROGRESS NOTES
Physical Therapy Daily Treatment Note     Name: Holly HEREDIA [facility] Number: [id number]  Therapy Diagnosis:        Encounter Diagnoses   Name Primary?    S/P right rotator cuff repair      Limited range of motion (ROM) of shoulder          Physician: Yessi Hahn MD     Physician Orders: PT Eval and Treat   Medical Diagnosis from Referral: M75.101 (ICD-10-CM) - Nontraumatic rotator cuff tear, right  Evaluation Date: 10/19/2023  Authorization Period Expiration: 9/24/2024  Plan of Care Expiration: 2/19/2024  Progress Note Due: 4/19/2024  Visit # / Visits authorized: 6/12  FOTO: 1/1     Precautions: Standard      Time In: 1000  Time Out: 1100  Total Appointment Time (timed & untimed codes): 60 minutes     POSTOPERATIVE PLAN: We will follow the arthroscopic rotator cuff repair guidelines for a small size rotator cuff tear.  We discussed with the patient's family after surgery.  The patient will remain in a sling for 6 weeks.  PT to start at 1-2 weeks.     Cuff specific program:  Pendulum exercises and Codman's exercises in 5-7 days, protecting rotator cuff repair for 1 week by avoiding active motion program until 1 week.     PASSIVE ROM: ER side 30 degrees, Forward Flex 90 degrees, ABD - 60 degrees   Full AAROM/PROM starting at 5-7 days as tolerated         Quality of tissue: fair      Quality of the repair: good       Size of tear: 10 x 15 mm, 60% partial thickness bursal     Subjective     Pt reports: Using my shoulder more around the house, not as painful but stiff  She was compliant with home exercise program.  Response to previous treatment: soreness  Functional change: out of the sling    Pain: 4-5/10  Location: right shoulder      Objective     Holly received therapeutic exercises to develop strength, endurance, ROM, flexibility, posture, and core stabilization for 8 minutes including:  Shld reassessment   Pulley flexion/scaption 4' each    Holly received the following manual therapy techniques: Joint  mobilizations, Manual traction, Myofacial release, Manual Lymphatic Drainage, Soft tissue Mobilization, and Friction Massage were applied to the: R shld  for 12 minutes, including:  oscillations R shoulder pain relief  Scapular mobs   Flexion pain free range  PROM elbow extension  ER 10 deg arm by side       Holly participated in neuromuscular re-education activities to improve: Balance, Coordination, Kinesthetic, Sense, Proprioception, and Posture for 20 minutes. The following activities were included:    Sidelying wand flexion 3 x 10  Slot machines 3 x 12  Table step backs 30x    Holly participated in dynamic functional therapeutic activities to improve functional performance for 20  minutes, including:  Supine wand chest  press plus 2x20  Supine wand flexion 3x10  Sidelying ER to neutral 3 x 12    RICE R shld 0' after tx.     Home Exercises Provided and Patient Education Provided     Education provided:   - Compliance with HEP     Written Home Exercises Provided: yes.  Exercises were reviewed and Holly was able to demonstrate them prior to the end of the session.  Holly demonstrated good  understanding of the education provided.       Assessment     Holly had improved motion passive and AAROM today with slot machine. She was educated on modify motion with ER in pain free range and to continue using the arm functionally around the home    Holly Is progressing well towards her goals.   Pt prognosis is Good.     Pt will continue to benefit from skilled outpatient physical therapy to address the deficits listed in the problem list box on initial evaluation, provide pt/family education and to maximize pt's level of independence in the home and community environment.     Pt's spiritual, cultural and educational needs considered and pt agreeable to plan of care and goals.    Anticipated barriers to physical therapy: none     Goals: GOALS: Short Term Goals:  6 weeks  1.Report decreased shoulder pain < / =  2/10  to  increase tolerance for return to ADLs out the sling(Progressing, not met)  2. Increase PROM 120 flexion and 25 ER to restore a more functional ROM (Progressing, not met)  3. Increased strength by 1/3 MMT grade in R shoulder scap stabilizers to increase tolerance for ADL and work activities.(Progressing, not met)  4. 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 Pt will have improved gcode of CJ (20-40% limited) on FOTO shoulder in order to demonstrate true functional improvement. (Progressing, not met)    Plan     Continue with POC to restore PROM, progressed MO De León, PT, DPT

## 2023-11-29 ENCOUNTER — CLINICAL SUPPORT (OUTPATIENT)
Dept: REHABILITATION | Facility: HOSPITAL | Age: 53
End: 2023-11-29
Payer: OTHER MISCELLANEOUS

## 2023-11-29 DIAGNOSIS — M25.511 ACUTE PAIN OF RIGHT SHOULDER: Primary | ICD-10-CM

## 2023-11-29 DIAGNOSIS — M25.619 LIMITED RANGE OF MOTION (ROM) OF SHOULDER: ICD-10-CM

## 2023-11-29 PROCEDURE — 97140 MANUAL THERAPY 1/> REGIONS: CPT | Performed by: PHYSICAL THERAPIST

## 2023-11-29 PROCEDURE — 97112 NEUROMUSCULAR REEDUCATION: CPT | Performed by: PHYSICAL THERAPIST

## 2023-11-29 PROCEDURE — 97530 THERAPEUTIC ACTIVITIES: CPT | Performed by: PHYSICAL THERAPIST

## 2023-11-29 NOTE — PROGRESS NOTES
Physical Therapy Daily Treatment Note     Name: Holly HEREDIA [facility] Number: [id number]  Therapy Diagnosis:        Encounter Diagnoses   Name Primary?    S/P right rotator cuff repair      Limited range of motion (ROM) of shoulder          Physician: Yessi Hahn MD     Physician Orders: PT Eval and Treat   Medical Diagnosis from Referral: M75.101 (ICD-10-CM) - Nontraumatic rotator cuff tear, right  Evaluation Date: 10/19/2023  Authorization Period Expiration: 9/24/2024  Plan of Care Expiration: 2/19/2024  Progress Note Due: 4/19/2024  Visit # / Visits authorized: 7/12  FOTO: 1/1     Precautions: Standard      Time In: 1000  Time Out: 1100  Total Appointment Time (timed & untimed codes): 60 minutes     POSTOPERATIVE PLAN: We will follow the arthroscopic rotator cuff repair guidelines for a small size rotator cuff tear.  We discussed with the patient's family after surgery.  The patient will remain in a sling for 6 weeks.  PT to start at 1-2 weeks.     Cuff specific program:  Pendulum exercises and Codman's exercises in 5-7 days, protecting rotator cuff repair for 1 week by avoiding active motion program until 1 week.     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   She was compliant with home exercise program.  Response to previous treatment: soreness  Functional change: out of the sling    Pain: 4-5/10  Location: right shoulder      Objective     Holly received therapeutic exercises to develop strength, endurance, ROM, flexibility, posture, and core stabilization for 0 minutes including:    Shld reassessment   Pulley flexion/scaption 4' each    Holly received the following manual therapy  "techniques: Joint mobilizations, Manual traction, Myofacial release, Manual Lymphatic Drainage, Soft tissue Mobilization, and Friction Massage were applied to the: R shld  for 12 minutes, including:    oscillations R shoulder pain relief  Scapular mobs   Flexion pain free range  PROM elbow extension  ER 10 deg arm by side       Holly participated in neuromuscular re-education activities to improve: Balance, Coordination, Kinesthetic, Sense, Proprioception, and Posture for 11 minutes. The following activities were included:    Slot machines 3 x 12  Prone extensions 3 x 10  Prone row 3 x 10    Holly participated in dynamic functional therapeutic activities to improve functional performance for 37  minutes, including:    Ube 8' for shoulder AAROM, cardio endurance training and functional reach  Supine wand chest  press plus 2x20  Supine wand flexion 3x10  Towel slide up the wall 20x    NT  Sidelying ER to neutral 3 x 12    RICE R shld 0' after tx.     Home Exercises Provided and Patient Education Provided     Education provided:   - Compliance with HEP     Written Home Exercises Provided: yes.  Exercises were reviewed and oHlly was able to demonstrate them prior to the end of the session.  Holly demonstrated good  understanding of the education provided.       Assessment     Speed increased on UBE with time, motion and confidence improving. Notes fatigue with exercise additions today, "feels like something is sitting on my shoulder". Progressing well with AAROM and scap stabilization    Holly Is progressing well towards her goals.   Pt prognosis is Good.     Pt will continue to benefit from skilled outpatient physical therapy to address the deficits listed in the problem list box on initial evaluation, provide pt/family education and to maximize pt's level of independence in the home and community environment.     Pt's spiritual, cultural and educational needs considered and pt agreeable to plan of care and " goals.    Anticipated barriers to physical therapy: none     Goals: GOALS: Short Term Goals:  6 weeks  1.Report decreased shoulder pain < / =  2/10  to increase tolerance for return to ADLs out the sling(Progressing, not met)  2. Increase PROM 120 flexion and 25 ER to restore a more functional ROM (Progressing, not met)  3. Increased strength by 1/3 MMT grade in R shoulder scap stabilizers to increase tolerance for ADL and work activities.(Progressing, not met)  4. 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 Pt will have improved gcode of CJ (20-40% limited) on FOTO shoulder in order to demonstrate true functional improvement. (Progressing, not met)    Plan     Continue with POC to restore PROM, progressed MO De León, PT, DPT

## 2023-12-04 ENCOUNTER — CLINICAL SUPPORT (OUTPATIENT)
Dept: REHABILITATION | Facility: HOSPITAL | Age: 53
End: 2023-12-04
Payer: OTHER MISCELLANEOUS

## 2023-12-04 DIAGNOSIS — M25.619 LIMITED RANGE OF MOTION (ROM) OF SHOULDER: ICD-10-CM

## 2023-12-04 DIAGNOSIS — M25.511 ACUTE PAIN OF RIGHT SHOULDER: Primary | ICD-10-CM

## 2023-12-04 PROCEDURE — 97530 THERAPEUTIC ACTIVITIES: CPT | Mod: CQ

## 2023-12-04 PROCEDURE — 97112 NEUROMUSCULAR REEDUCATION: CPT | Mod: CQ

## 2023-12-04 PROCEDURE — 97140 MANUAL THERAPY 1/> REGIONS: CPT | Mod: CQ

## 2023-12-04 NOTE — PROGRESS NOTES
Physical Therapy Daily Treatment Note     Name: Holly HEREDIA [facility] Number: [id number]  Therapy Diagnosis:        Encounter Diagnoses   Name Primary?    S/P right rotator cuff repair      Limited range of motion (ROM) of shoulder          Physician: Yessi Hahn MD     Physician Orders: PT Eval and Treat   Medical Diagnosis from Referral: M75.101 (ICD-10-CM) - Nontraumatic rotator cuff tear, right  Evaluation Date: 10/19/2023  Authorization Period Expiration: 9/24/2024  Plan of Care Expiration: 2/19/2024  Progress Note Due: 4/19/2024  Visit # / Visits authorized: 7/12  FOTO: 1/1     Precautions: Standard      Time In: 1000  Time Out: 1100  Total Appointment Time (timed & untimed codes): 60 minutes     POSTOPERATIVE PLAN: We will follow the arthroscopic rotator cuff repair guidelines for a small size rotator cuff tear.  We discussed with the patient's family after surgery.  The patient will remain in a sling for 6 weeks.  PT to start at 1-2 weeks.     Cuff specific program:  Pendulum exercises and Codman's exercises in 5-7 days, protecting rotator cuff repair for 1 week by avoiding active motion program until 1 week.     PASSIVE ROM: ER side 30 degrees, Forward Flex 90 degrees, ABD - 60 degrees   Full AAROM/PROM starting at 5-7 days as tolerated         Quality of tissue: fair      Quality of the repair: good       Size of tear: 10 x 15 mm, 60% partial thickness bursal     Subjective     Pt reports: mod pain level today.   She was compliant with home exercise program.  Response to previous treatment: I was in a lot of pain after, thought I was going to have to go to the ER  Functional change: improved functional mobility     Pain: 4/10  Location: right shoulder      Objective     Holly received therapeutic exercises to develop strength, endurance, ROM, flexibility, posture, and core stabilization for 0 minutes including:      Holly received the following manual therapy techniques: Joint mobilizations, Manual  "traction, Myofacial release, Manual Lymphatic Drainage, Soft tissue Mobilization, and Friction Massage were applied to the: R shld  for 10 minutes, including:  oscillations R shoulder pain relief  Scapular mobs   Flexion pain free range  PROM elbow extension  ER 10 deg arm by side       Holly participated in neuromuscular re-education activities to improve: Balance, Coordination, Kinesthetic, Sense, Proprioception, and Posture for 15 minutes. The following activities were included:  Slot machines 3x10  Prone extensions 3x10/3"  Prone row 3x10/3"    Holly participated in dynamic functional therapeutic activities to improve functional performance for 35  minutes, including:  UBE fwd 5'   Pulley shld flex 5'  Supine wand chest press plus 2x20  Supine wand flexion 3x10  Sidelying ER to neutral 3x10/3"  Towel slide up the wall 20x      RICE R shld 0' after tx.     Home Exercises Provided and Patient Education Provided     Education provided:   - Compliance with HEP     Written Home Exercises Provided: yes.  Exercises were reviewed and Holly was able to demonstrate them prior to the end of the session.  Holly demonstrated good  understanding of the education provided.       Assessment   Pt tolerating tx well. Continue with ROM and periscapular strengthening in pain free range of motion. TTP anterior shld and biceps R shld. Appropriated mm fatigue and soreness after tx.  VC/TC for correcting form/technique. Progressing well with AAROM and scap stabilization    Holly Is progressing well towards her goals.   Pt prognosis is Good.     Pt will continue to benefit from skilled outpatient physical therapy to address the deficits listed in the problem list box on initial evaluation, provide pt/family education and to maximize pt's level of independence in the home and community environment.     Pt's spiritual, cultural and educational needs considered and pt agreeable to plan of care and goals.    Anticipated barriers to " physical therapy: none     Goals: GOALS: Short Term Goals:  6 weeks  1.Report decreased shoulder pain < / =  2/10  to increase tolerance for return to ADLs out the sling(Progressing, not met)  2. Increase PROM 120 flexion and 25 ER to restore a more functional ROM (Progressing, not met)  3. Increased strength by 1/3 MMT grade in R shoulder scap stabilizers to increase tolerance for ADL and work activities.(Progressing, not met)  4. 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 Pt will have improved gcode of CJ (20-40% limited) on FOTO shoulder in order to demonstrate true functional improvement. (Progressing, not met)    Plan     Continue with POC to restore PROM, progressed MO Freitas, PTA, STS

## 2023-12-06 ENCOUNTER — CLINICAL SUPPORT (OUTPATIENT)
Dept: REHABILITATION | Facility: HOSPITAL | Age: 53
End: 2023-12-06
Payer: OTHER MISCELLANEOUS

## 2023-12-06 DIAGNOSIS — M25.619 LIMITED RANGE OF MOTION (ROM) OF SHOULDER: ICD-10-CM

## 2023-12-06 DIAGNOSIS — M25.511 ACUTE PAIN OF RIGHT SHOULDER: Primary | ICD-10-CM

## 2023-12-06 PROCEDURE — 97140 MANUAL THERAPY 1/> REGIONS: CPT | Performed by: PHYSICAL THERAPIST

## 2023-12-06 PROCEDURE — 97112 NEUROMUSCULAR REEDUCATION: CPT | Performed by: PHYSICAL THERAPIST

## 2023-12-06 PROCEDURE — 97530 THERAPEUTIC ACTIVITIES: CPT | Performed by: PHYSICAL THERAPIST

## 2023-12-08 NOTE — PROGRESS NOTES
Physical Therapy Daily Treatment Note     Name: Holly HEREDIA [facility] Number: [id number]  Therapy Diagnosis:        Encounter Diagnoses   Name Primary?    S/P right rotator cuff repair      Limited range of motion (ROM) of shoulder          Physician: Yessi Hahn MD     Physician Orders: PT Eval and Treat   Medical Diagnosis from Referral: M75.101 (ICD-10-CM) - Nontraumatic rotator cuff tear, right  Evaluation Date: 10/19/2023  Authorization Period Expiration: 9/24/2024  Plan of Care Expiration: 2/19/2024  Progress Note Due: 4/19/2024  Visit # / Visits authorized: 9/12  FOTO: 1/1     Precautions: Standard      Time In: 1000  Time Out: 1100  Total Appointment Time (timed & untimed codes): 60 minutes     POSTOPERATIVE PLAN: We will follow the arthroscopic rotator cuff repair guidelines for a small size rotator cuff tear.  We discussed with the patient's family after surgery.  The patient will remain in a sling for 6 weeks.  PT to start at 1-2 weeks.     Cuff specific program:  Pendulum exercises and Codman's exercises in 5-7 days, protecting rotator cuff repair for 1 week by avoiding active motion program until 1 week.     PASSIVE ROM: ER side 30 degrees, Forward Flex 90 degrees, ABD - 60 degrees   Full AAROM/PROM starting at 5-7 days as tolerated         Quality of tissue: fair      Quality of the repair: good       Size of tear: 10 x 15 mm, 60% partial thickness bursal     Subjective     Pt reports: shoulder is doing okay today, a little better  She was compliant with home exercise program.  Response to previous treatment: no issues  Functional change: improved functional mobility     Pain: 4/10  Location: right shoulder      Objective     Holly received therapeutic exercises to develop strength, endurance, ROM, flexibility, posture, and core stabilization for 0 minutes including:      Holly received the following manual therapy techniques: Joint mobilizations, Manual traction, Myofacial release, Manual Lymphatic  "Drainage, Soft tissue Mobilization, and Friction Massage were applied to the: R shld  for 10 minutes, including:  oscillations R shoulder pain relief  Scapular mobs   Flexion pain free range  PROM elbow extension  ER 10 deg arm by side       Holly participated in neuromuscular re-education activities to improve: Balance, Coordination, Kinesthetic, Sense, Proprioception, and Posture for 15 minutes. The following activities were included:  Slot machines 3x10  IR/ER walkouts OTB 3x10    NT  Prone extensions 3x10/3"  Prone row 3x10/3"    Holly participated in dynamic functional therapeutic activities to improve functional performance for 35  minutes, including:  UBE fwd 5'   Ber shld flex/scaption 4'/4  Supine wand chest press plus 2x20  Supine wand flexion 3x10  Sidelying ER to neutral 3x10/3"  Sidelying flexion 3x10      RICE R shld 0' after tx.     Home Exercises Provided and Patient Education Provided     Education provided:   - Compliance with HEP     Written Home Exercises Provided: yes.  Exercises were reviewed and Holly was able to demonstrate them prior to the end of the session.  Holly demonstrated good  understanding of the education provided.       Assessment   Holly progressed with isometric walkouts today. PROM to the shoulder is good just lacks active motion. Will continue to progress scap stabilization as tolerated    Holly Is progressing well towards her goals.   Pt prognosis is Good.     Pt will continue to benefit from skilled outpatient physical therapy to address the deficits listed in the problem list box on initial evaluation, provide pt/family education and to maximize pt's level of independence in the home and community environment.     Pt's spiritual, cultural and educational needs considered and pt agreeable to plan of care and goals.    Anticipated barriers to physical therapy: none     Goals: GOALS: Short Term Goals:  6 weeks  1.Report decreased shoulder pain < / =  2/10  to " increase tolerance for return to ADLs out the sling(Progressing, not met)  2. Increase PROM 120 flexion and 25 ER to restore a more functional ROM (Progressing, not met)  3. Increased strength by 1/3 MMT grade in R shoulder scap stabilizers to increase tolerance for ADL and work activities.(Progressing, not met)  4. 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 Pt will have improved gcode of CJ (20-40% limited) on FOTO shoulder in order to demonstrate true functional improvement. (Progressing, not met)    Plan     Continue with POC to restore PROM, progressed MO De León, PT, DPT

## 2023-12-11 ENCOUNTER — CLINICAL SUPPORT (OUTPATIENT)
Dept: REHABILITATION | Facility: HOSPITAL | Age: 53
End: 2023-12-11
Payer: OTHER MISCELLANEOUS

## 2023-12-11 DIAGNOSIS — M25.511 ACUTE PAIN OF RIGHT SHOULDER: Primary | ICD-10-CM

## 2023-12-11 DIAGNOSIS — M25.619 LIMITED RANGE OF MOTION (ROM) OF SHOULDER: ICD-10-CM

## 2023-12-11 PROCEDURE — 97140 MANUAL THERAPY 1/> REGIONS: CPT | Mod: CQ

## 2023-12-11 PROCEDURE — 97112 NEUROMUSCULAR REEDUCATION: CPT | Mod: CQ

## 2023-12-11 PROCEDURE — 97530 THERAPEUTIC ACTIVITIES: CPT | Mod: CQ

## 2023-12-13 ENCOUNTER — CLINICAL SUPPORT (OUTPATIENT)
Dept: REHABILITATION | Facility: HOSPITAL | Age: 53
End: 2023-12-13
Payer: OTHER MISCELLANEOUS

## 2023-12-13 DIAGNOSIS — M25.511 ACUTE PAIN OF RIGHT SHOULDER: Primary | ICD-10-CM

## 2023-12-13 DIAGNOSIS — M25.619 LIMITED RANGE OF MOTION (ROM) OF SHOULDER: ICD-10-CM

## 2023-12-13 PROCEDURE — 97112 NEUROMUSCULAR REEDUCATION: CPT | Mod: CQ

## 2023-12-13 PROCEDURE — 97140 MANUAL THERAPY 1/> REGIONS: CPT | Mod: CQ

## 2023-12-13 PROCEDURE — 97530 THERAPEUTIC ACTIVITIES: CPT | Mod: CQ

## 2023-12-13 NOTE — PROGRESS NOTES
Physical Therapy Daily Treatment Note     Name: Holly HEREDIA [facility] Number: [id number]  Therapy Diagnosis:        Encounter Diagnoses   Name Primary?    S/P right rotator cuff repair      Limited range of motion (ROM) of shoulder          Physician: Yessi Hahn MD     Physician Orders: PT Eval and Treat   Medical Diagnosis from Referral: M75.101 (ICD-10-CM) - Nontraumatic rotator cuff tear, right  Evaluation Date: 10/19/2023  Authorization Period Expiration: 9/24/2024  Plan of Care Expiration: 2/19/2024  Progress Note Due: 4/19/2024  Visit # / Visits authorized: 9/12  FOTO: 1/1     Precautions: Standard      Time In: 1010  Time Out: 1100  Total Appointment Time (timed & untimed codes): 60 minutes     POSTOPERATIVE PLAN: We will follow the arthroscopic rotator cuff repair guidelines for a small size rotator cuff tear.  We discussed with the patient's family after surgery.  The patient will remain in a sling for 6 weeks.  PT to start at 1-2 weeks.     Cuff specific program:  Pendulum exercises and Codman's exercises in 5-7 days, protecting rotator cuff repair for 1 week by avoiding active motion program until 1 week.     PASSIVE ROM: ER side 30 degrees, Forward Flex 90 degrees, ABD - 60 degrees   Full AAROM/PROM starting at 5-7 days as tolerated         Quality of tissue: fair      Quality of the repair: good       Size of tear: 10 x 15 mm, 60% partial thickness bursal     Subjective     Pt reports: tender and sore  She was compliant with home exercise program.  Response to previous treatment: soreness   Functional change: improved functional mobility     Pain: 3/10  Location: right shoulder      Objective     Holly received therapeutic exercises to develop strength, endurance, ROM, flexibility, posture, and core stabilization for 0 minutes including:      Holly received the following manual therapy techniques: Joint mobilizations, Manual traction, Myofacial release, Manual Lymphatic Drainage, Soft tissue  "Mobilization, and Friction Massage were applied to the: R shld  for 10 minutes, including:  oscillations R shoulder pain relief  Scapular mobs   Flexion pain free range  PROM elbow extension  ER 10 deg arm by side       Holly participated in neuromuscular re-education activities to improve: Balance, Coordination, Kinesthetic, Sense, Proprioception, and Posture for 15 minutes. The following activities were included:  Slot machines 3x10  IR/ER walkouts OTB 3x10/3"  GTB rows 3x10      Holly participated in dynamic functional therapeutic activities to improve functional performance for 25  minutes, including:  UBE 5'/5'   Pulley shld flex/scaption 4'/4  Supine wand chest press plus 2x20  Supine wand flexion 3x10  Sidelying ER to neutral 3x10/3"  Sidelying flexion 3x10  Sidelying abd 3x10      Home Exercises Provided and Patient Education Provided     Education provided:   - Compliance with HEP     Written Home Exercises Provided: yes.  Exercises were reviewed and Holly was able to demonstrate them prior to the end of the session.  Holly demonstrated good  understanding of the education provided.       Assessment   Pt tolerating tx well. Continued with shld ROM, strengthening and stabilization for improved functional uise with decreased pain. VC/TC for correcting form/technique Will continue to progress scap stabilization as tolerated    Holly Is progressing well towards her goals.   Pt prognosis is Good.     Pt will continue to benefit from skilled outpatient physical therapy to address the deficits listed in the problem list box on initial evaluation, provide pt/family education and to maximize pt's level of independence in the home and community environment.     Pt's spiritual, cultural and educational needs considered and pt agreeable to plan of care and goals.    Anticipated barriers to physical therapy: none     Goals: GOALS: Short Term Goals:  6 weeks  1.Report decreased shoulder pain < / =  2/10  to increase " tolerance for return to ADLs out the sling(Progressing, not met)  2. Increase PROM 120 flexion and 25 ER to restore a more functional ROM (Progressing, not met)  3. Increased strength by 1/3 MMT grade in R shoulder scap stabilizers to increase tolerance for ADL and work activities.(Progressing, not met)  4. 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 Pt will have improved gcode of CJ (20-40% limited) on FOTO shoulder in order to demonstrate true functional improvement. (Progressing, not met)    Plan     Continue with POC to restore PROM, progressed MO Freitas, PTA, STS

## 2023-12-14 ENCOUNTER — TELEPHONE (OUTPATIENT)
Dept: SPORTS MEDICINE | Facility: CLINIC | Age: 53
End: 2023-12-14
Payer: COMMERCIAL

## 2023-12-14 NOTE — TELEPHONE ENCOUNTER
----- Message from Emi Gardner MA sent at 12/14/2023  1:47 PM CST -----    ----- Message -----  From: Janel Morales  Sent: 12/14/2023   1:25 PM CST  To: Selma Dickson Staff    Pt calling to have more PT  appt put in     Confirmed patient's contact info below:  Contact Name: Holly Julian  Phone Number: [phone]

## 2023-12-21 ENCOUNTER — CLINICAL SUPPORT (OUTPATIENT)
Dept: REHABILITATION | Facility: HOSPITAL | Age: 53
End: 2023-12-21
Payer: OTHER MISCELLANEOUS

## 2023-12-21 DIAGNOSIS — M25.619 LIMITED RANGE OF MOTION (ROM) OF SHOULDER: ICD-10-CM

## 2023-12-21 DIAGNOSIS — M25.511 ACUTE PAIN OF RIGHT SHOULDER: Primary | ICD-10-CM

## 2023-12-21 PROCEDURE — 97530 THERAPEUTIC ACTIVITIES: CPT | Mod: CQ

## 2023-12-21 PROCEDURE — 97140 MANUAL THERAPY 1/> REGIONS: CPT | Mod: CQ

## 2023-12-21 PROCEDURE — 97112 NEUROMUSCULAR REEDUCATION: CPT | Mod: CQ

## 2023-12-21 NOTE — PROGRESS NOTES
Physical Therapy Daily Treatment Note     Name: Holly HEREDIA [facility] Number: [id number]  Therapy Diagnosis:        Encounter Diagnoses   Name Primary?    S/P right rotator cuff repair      Limited range of motion (ROM) of shoulder          Physician: Yessi Hahn MD     Physician Orders: PT Eval and Treat   Medical Diagnosis from Referral: M75.101 (ICD-10-CM) - Nontraumatic rotator cuff tear, right  Evaluation Date: 10/19/2023  Authorization Period Expiration: 9/24/2024  Plan of Care Expiration: 2/19/2024  Progress Note Due: 4/19/2024  Visit # / Visits authorized: 9/12  FOTO: 1/1     Precautions: Standard      Time In: 1000  Time Out: 1100  Total Appointment Time (timed & untimed codes): 60 minutes     POSTOPERATIVE PLAN: We will follow the arthroscopic rotator cuff repair guidelines for a small size rotator cuff tear.  We discussed with the patient's family after surgery.  The patient will remain in a sling for 6 weeks.  PT to start at 1-2 weeks.     Cuff specific program:  Pendulum exercises and Codman's exercises in 5-7 days, protecting rotator cuff repair for 1 week by avoiding active motion program until 1 week.     PASSIVE ROM: ER side 30 degrees, Forward Flex 90 degrees, ABD - 60 degrees   Full AAROM/PROM starting at 5-7 days as tolerated         Quality of tissue: fair      Quality of the repair: good       Size of tear: 10 x 15 mm, 60% partial thickness bursal     Subjective     Pt reports: tender and sore, not as bad as when I first started  She was compliant with home exercise program.  Response to previous treatment: soreness   Functional change: improved functional mobility     Pain: 4/10  Location: right shoulder      Objective     Holly received therapeutic exercises to develop strength, endurance, ROM, flexibility, posture, and core stabilization for 0 minutes including:      Holly received the following manual therapy techniques: Joint mobilizations, Manual traction, Myofacial release, Manual  "Lymphatic Drainage, Soft tissue Mobilization, and Friction Massage were applied to the: R shld  for 10 minutes, including:  oscillations R shoulder pain relief  Scapular mobs   Flexion pain free range  PROM elbow extension  ER 10 deg arm by side       Holly participated in neuromuscular re-education activities to improve: Balance, Coordination, Kinesthetic, Sense, Proprioception, and Posture for 15 minutes. The following activities were included:  LLLD ER stretch 5# 5' - shld abd 45'   Slot machines 3x10  IR/ER walkouts OTB 3x10/3"  GTB rows 3x10      Holly participated in dynamic functional therapeutic activities to improve functional performance for 30  minutes, including:  UBE 5'/5'   Pulley shld flex/scaption 4'/4  Supine wand chest press plus 2x20  Supine wand flexion 3x10  Sidelying ER to neutral 3x10/3"  Sidelying flexion 3x10  Sidelying abd 3x10      Home Exercises Provided and Patient Education Provided     Education provided:   - Compliance with HEP     Written Home Exercises Provided: yes.  Exercises were reviewed and Holly was able to demonstrate them prior to the end of the session.  Holly demonstrated good  understanding of the education provided.       Assessment   Pt tolerating tx fair.  Continued with shld ROM, strengthening and stabilization for improved functional use with decreased pain. VC/TC for correcting form/technique Will continue to progress scap stabilization as tolerated    Holly Is progressing well towards her goals.   Pt prognosis is Good.     Pt will continue to benefit from skilled outpatient physical therapy to address the deficits listed in the problem list box on initial evaluation, provide pt/family education and to maximize pt's level of independence in the home and community environment.     Pt's spiritual, cultural and educational needs considered and pt agreeable to plan of care and goals.    Anticipated barriers to physical therapy: none     Goals: GOALS: Short Term " Goals:  6 weeks  1.Report decreased shoulder pain < / =  2/10  to increase tolerance for return to ADLs out the sling(Progressing, not met)  2. Increase PROM 120 flexion and 25 ER to restore a more functional ROM (Progressing, not met)  3. Increased strength by 1/3 MMT grade in R shoulder scap stabilizers to increase tolerance for ADL and work activities.(Progressing, not met)  4. 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 Pt will have improved gcode of CJ (20-40% limited) on FOTO shoulder in order to demonstrate true functional improvement. (Progressing, not met)    Plan     Continue with POC to restore PROM, progressed MO Freitas, PTA, STS

## 2024-01-03 ENCOUNTER — OFFICE VISIT (OUTPATIENT)
Dept: SPORTS MEDICINE | Facility: CLINIC | Age: 54
End: 2024-01-03
Payer: OTHER MISCELLANEOUS

## 2024-01-03 ENCOUNTER — CLINICAL SUPPORT (OUTPATIENT)
Dept: REHABILITATION | Facility: HOSPITAL | Age: 54
End: 2024-01-03
Payer: OTHER MISCELLANEOUS

## 2024-01-03 VITALS
WEIGHT: 220 LBS | HEART RATE: 108 BPM | SYSTOLIC BLOOD PRESSURE: 136 MMHG | HEIGHT: 59 IN | DIASTOLIC BLOOD PRESSURE: 79 MMHG | BODY MASS INDEX: 44.35 KG/M2

## 2024-01-03 DIAGNOSIS — M25.511 ACUTE PAIN OF RIGHT SHOULDER: Primary | ICD-10-CM

## 2024-01-03 DIAGNOSIS — Z98.890 S/P RIGHT ROTATOR CUFF REPAIR: Primary | ICD-10-CM

## 2024-01-03 DIAGNOSIS — M25.619 LIMITED RANGE OF MOTION (ROM) OF SHOULDER: ICD-10-CM

## 2024-01-03 DIAGNOSIS — M25.511 RIGHT SHOULDER PAIN, UNSPECIFIED CHRONICITY: ICD-10-CM

## 2024-01-03 PROCEDURE — 97140 MANUAL THERAPY 1/> REGIONS: CPT | Mod: CQ

## 2024-01-03 PROCEDURE — 99214 OFFICE O/P EST MOD 30 MIN: CPT | Mod: S$GLB,,, | Performed by: ORTHOPAEDIC SURGERY

## 2024-01-03 PROCEDURE — 99999 PR PBB SHADOW E&M-EST. PATIENT-LVL III: CPT | Mod: PBBFAC,,, | Performed by: ORTHOPAEDIC SURGERY

## 2024-01-03 PROCEDURE — 97112 NEUROMUSCULAR REEDUCATION: CPT | Mod: CQ

## 2024-01-03 PROCEDURE — 97530 THERAPEUTIC ACTIVITIES: CPT | Mod: CQ

## 2024-01-03 NOTE — PROGRESS NOTES
Physical Therapy Daily Treatment Note     Name: Holly HEREDIA [facility] Number: [id number]  Therapy Diagnosis:        Encounter Diagnoses   Name Primary?    S/P right rotator cuff repair      Limited range of motion (ROM) of shoulder          Physician: Yessi Hahn MD     Physician Orders: PT Eval and Treat   Medical Diagnosis from Referral: M75.101 (ICD-10-CM) - Nontraumatic rotator cuff tear, right  Evaluation Date: 10/19/2023  Authorization Period Expiration: 9/24/2024  Plan of Care Expiration: 2/19/2024  Progress Note Due: 4/19/2024  Visit # / Visits authorized: 9/12  FOTO: 1/1     Precautions: Standard      Time In: 0800  Time Out: 0900  Total Appointment Time (timed & untimed codes): 60 minutes     POSTOPERATIVE PLAN: We will follow the arthroscopic rotator cuff repair guidelines for a small size rotator cuff tear.  We discussed with the patient's family after surgery.  The patient will remain in a sling for 6 weeks.  PT to start at 1-2 weeks.     Cuff specific program:  Pendulum exercises and Codman's exercises in 5-7 days, protecting rotator cuff repair for 1 week by avoiding active motion program until 1 week.     PASSIVE ROM: ER side 30 degrees, Forward Flex 90 degrees, ABD - 60 degrees   Full AAROM/PROM starting at 5-7 days as tolerated         Quality of tissue: fair      Quality of the repair: good       Size of tear: 10 x 15 mm, 60% partial thickness bursal     Subjective     Pt reports: increased pain shld flex and abd at 90'   She was compliant with home exercise program.  Response to previous treatment: soreness   Functional change: improved functional mobility, but still painful 90'     Pain: 6/10  Location: right shoulder      Objective     Holly received therapeutic exercises to develop strength, endurance, ROM, flexibility, posture, and core stabilization for 0 minutes including:      Holly received the following manual therapy techniques: Joint mobilizations, Manual traction, Myofacial  "release, Manual Lymphatic Drainage, Soft tissue Mobilization, and Friction Massage were applied to the: R shld  for 15 minutes, including:  oscillations R shoulder pain relief  Scapular mobs   Flexion pain free range  PROM elbow extension  ER 10 deg arm by side       Holly participated in neuromuscular re-education activities to improve: Balance, Coordination, Kinesthetic, Sense, Proprioception, and Posture for 15 minutes. The following activities were included:  LLLD ER stretch 5# 5' - shld abd 45'   Slot machines 3x10  IR/ER walkouts OTB 3x10/3"  GTB rows 3x10      Holly participated in dynamic functional therapeutic activities to improve functional performance for 30  minutes, including:  UBE 5'/5'   Pulley shld flex/scaption 4'/4  Supine wand chest press plus 2x20  Supine wand flexion 3x10  Sidelying ER to neutral 3x10/3"  Sidelying flexion 3x10  Sidelying abd 3x10      Home Exercises Provided and Patient Education Provided     Education provided:   - Compliance with HEP     Written Home Exercises Provided: yes.  Exercises were reviewed and Holly was able to demonstrate them prior to the end of the session.  Holly demonstrated good  understanding of the education provided.       Assessment   Pt tolerating tx fair.  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 VC/TC for correcting form/technique.  Will continue to progress scap stabilization as tolerated    Holly Is progressing well towards her goals.   Pt prognosis is Good.     Pt will continue to benefit from skilled outpatient physical therapy to address the deficits listed in the problem list box on initial evaluation, provide pt/family education and to maximize pt's level of independence in the home and community environment.     Pt's spiritual, cultural and educational needs considered and pt agreeable to plan of care and goals.    Anticipated barriers to physical therapy: none "     Goals: GOALS: Short Term Goals:  6 weeks  1.Report decreased shoulder pain < / =  2/10  to increase tolerance for return to ADLs out the sling(Progressing, not met)  2. Increase PROM 120 flexion and 25 ER to restore a more functional ROM (Progressing, not met)  3. Increased strength by 1/3 MMT grade in R shoulder scap stabilizers to increase tolerance for ADL and work activities.(Progressing, not met)  4. 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 Pt will have improved gcode of CJ (20-40% limited) on FOTO shoulder in order to demonstrate true functional improvement. (Progressing, not met)    Plan     Continue with POC to restore PROM, progressed MO Freitas, PTA, STS

## 2024-01-03 NOTE — PROGRESS NOTES
Chief Complaint: right shoulder pain    HISTORY OF PRESENT ILLNESS:   Pt is here today for follow up of shoulder arthroscopy.  she is doing well.  We have reviewed patient's findings and discussed plan of care and future treatment options.      Patient has been attending physical therapy at the [facility] location, working with Nasir and Judd. She also notes performing her HEP regularly     Pain today 0/10, patient notes a throbbing pain at rest at times and her pain increases with movement     SANE post op: 45   SANE pre op: 50      DATE OF PROCEDURE: 10/03/2023  right  1. Shoulder arthroscopic rotator cuff repair (CPT 64329) with CuffMend ()  2. Shoulder arthroscopic biceps tenodesis (CPT 48071)  3. Shoulder arthroscopic subacromial decompression, bursectomy   4. Shoulder arthroscopic extensive debridement (anterior, posterior glenohumeral joint, subacromial space) (CPT 57382)  5. Shoulder arthroscopic labral debridement (CPT 14021)  6. Shoulder arthroscopic lysis of adhesions (CPT 72521)  7. Shoulder arthroscopic distal clavicle excision (CPT 33579)     Size of tear: 10 x 15 mm, 60% partial thickness bursal          Review of Systems   Constitution: Negative. Negative for chills, fever and night sweats.   HENT: Negative for congestion and headaches.    Eyes: Negative for blurred vision, left vision loss and right vision loss.   Cardiovascular: Negative for chest pain and syncope.   Respiratory: Negative for cough and shortness of breath.    Endocrine: Negative for polydipsia, polyphagia and polyuria.   Hematologic/Lymphatic: Negative for bleeding problem. Does not bruise/bleed easily.   Skin: Negative for dry skin, itching and rash.   Musculoskeletal: Negative for falls and muscle weakness.   Gastrointestinal: Negative for abdominal pain and bowel incontinence.   Genitourinary: Negative for bladder incontinence and nocturia.   Neurological: Negative for disturbances in coordination, loss of balance and  seizures.   Psychiatric/Behavioral: Negative for depression. The patient does not have insomnia.    Allergic/Immunologic: Negative for hives and persistent infections.       PAST MEDICAL HISTORY:   Past Medical History:   Diagnosis Date    Essential (primary) hypertension      PAST SURGICAL HISTORY:   Past Surgical History:   Procedure Laterality Date    ARTHROSCOPIC DEBRIDEMENT OF SHOULDER Right 10/3/2023    Procedure: EXTENSIVE DEBRIDEMENT, SHOULDER, ARTHROSCOPIC;  Surgeon: Yessi Hahn MD;  Location: [facility] OR;  Service: Orthopedics;  Laterality: Right;    ARTHROSCOPIC REPAIR OF ROTATOR CUFF OF SHOULDER Right 10/3/2023    Procedure: REPAIR, ROTATOR CUFF, ARTHROSCOPIC WITH CUFF MEND;  Surgeon: Yessi Hahn MD;  Location: [facility] OR;  Service: Orthopedics;  Laterality: Right;    ARTHROSCOPY OF SHOULDER WITH DECOMPRESSION OF SUBACROMIAL SPACE Right 10/3/2023    Procedure: ARTHROSCOPY, SHOULDER, WITH SUBACROMIAL  DECOMPRESSION/ BURSECTOMY;  Surgeon: Yessi Hahn MD;  Location: [facility] OR;  Service: Orthopedics;  Laterality: Right;    ARTHROSCOPY,SHOULDER,WITH BICEPS TENODESIS Right 10/3/2023    Procedure: ARTHROSCOPY,SHOULDER,WITH BICEPS TENODESIS;  Surgeon: Yessi Hahn MD;  Location: [facility] OR;  Service: Orthopedics;  Laterality: Right;     SECTION  2001    DISTAL CLAVICLE EXCISION Right 10/3/2023    Procedure: EXCISION, CLAVICLE, DISTAL;  Surgeon: Yessi Hahn MD;  Location: [facility] OR;  Service: Orthopedics;  Laterality: Right;    LYSIS, ADHESIONS, SHOULDER, ARTHROSCOPIC Right 10/3/2023    Procedure: LYSIS, ADHESIONS, SHOULDER, ARTHROSCOPIC;  Surgeon: Yessi Hahn MD;  Location: [facility] OR;  Service: Orthopedics;  Laterality: Right;    SHOULDER ARTHROSCOPY Right 10/3/2023    Procedure: ARTHROSCOPY, SHOULDER WITH L;ABRAL DEBRIDEMENT;  Surgeon: Yessi Hahn MD;  Location: [facility] OR;  Service: Orthopedics;  Laterality: Right;     FAMILY HISTORY: History reviewed. No pertinent family history.  SOCIAL HISTORY:   Social  "History     Socioeconomic History    Marital status: Single   Tobacco Use    Smoking status: Never    Smokeless tobacco: Never   Substance and Sexual Activity    Alcohol use: Not Currently    Drug use: Never       MEDICATIONS:   Current Outpatient Medications:     naproxen sodium (ANAPROX) 220 MG tablet, Take 220 mg by mouth every 12 (twelve) hours., Disp: , Rfl:     amLODIPine (NORVASC) 5 MG tablet, TAKE 1 TABLET BY MOUTH EVERY DAY (Patient not taking: Reported on 11/15/2023), Disp: 90 tablet, Rfl: 3    aspirin (ECOTRIN) 81 MG EC tablet, Take 1 tablet (81 mg total) by mouth once daily. For 4 weeks starting the day after surgery. (Patient not taking: Reported on 11/15/2023), Disp: 28 tablet, Rfl: 0    docusate sodium (COLACE) 100 MG capsule, Take 1 capsule (100 mg total) by mouth 2 (two) times daily as needed for Constipation. (Patient not taking: Reported on 11/15/2023), Disp: 20 capsule, Rfl: 0    oxyCODONE-acetaminophen (PERCOCET)  mg per tablet, Take 1 tablet by mouth every 4-6 hours as needed for pain. Take stool softener with this medication. (Patient not taking: Reported on 11/15/2023), Disp: 21 tablet, Rfl: 0    promethazine (PHENERGAN) 25 MG tablet, Take 1 tablet (25 mg total) by mouth every 6 (six) hours as needed for Nausea. (Patient not taking: Reported on 11/15/2023), Disp: 8 tablet, Rfl: 0    traMADoL (ULTRAM) 50 mg tablet, Take 1-2 tablets ( mg total) by mouth every 6 (six) hours as needed for Pain. (Patient not taking: Reported on 11/15/2023), Disp: 21 tablet, Rfl: 0  ALLERGIES: Review of patient's allergies indicates:  No Known Allergies    VITAL SIGNS: /79   Pulse 108   Ht 4' 11" (1.499 m)   Wt 99.8 kg (220 lb)   BMI 44.43 kg/m²                                                                               PHYSICAL EXAMINATION:     Incision sites healed well  No evidence of any erythema, infection or induration  elbow Range of motion full   No effusion  2+ Radial pulses  No " swelling         ROM:      Forward Elevation: 90 (supine active assisted forward elevation: 120)  ER: 50  IR: buttock    Strength  Scaption at 0 and 30: 5/5  ER: 5/5                                                                          ASSESSMENT:                                                                                                                                               1. Status post above, doing well.                                                                                                                               PLAN:                                                                                                                                                     1. Continue PT, case discussed with therapist. Patient instructed on supine active assisted forward elevation to be performed 3 times a day for 3-5 minutes    2. Emphasized scapular function.  3. I have discussed return to activity in detail, patient is not clear to return to work at this time, patient is unable to operate/drive a motor vehicle at this time, unable to perform manual labor.  4. Patient will see us back in 3 months.                                      5. All questions were answered, surgical technique was reviewed and interpreted, and patient should contact us with any questions or concerns in the interim.

## 2024-01-03 NOTE — LETTER
Patient: Holly Julian   YOB: 1970   Clinic Number: [id number]   Today's Date: January 3, 2024        Work Status Summary     Holly is a patient under my care for right shoulder surgery on 10/3/2023. She was seen in my office today for a post op appointment on 1/3/24.     Work Status: NOT ABLE to work at present. Unable to operate/drive a motor vehicle at this time. Unable to perform manual labor.      Therapy Recomendations: Physical Therapy 1-3 times a week for 8-10 months.     Next Appointment: 4/3/2024 Holly will be seen by Dr. Yessi Hahn.                     January 3, 2024        Yessi Hahn MD  Signed (Provider)

## 2024-01-04 ENCOUNTER — PATIENT OUTREACH (OUTPATIENT)
Dept: ADMINISTRATIVE | Facility: HOSPITAL | Age: 54
End: 2024-01-04
Payer: COMMERCIAL

## 2024-01-04 NOTE — PROGRESS NOTES
Health Maintenance Due   Topic Date Due    Cervical Cancer Screening  Never done    HIV Screening  Never done    TETANUS VACCINE  Never done    Mammogram  Never done    Colorectal Cancer Screening  Never done    Shingles Vaccine (1 of 2) Never done    Influenza Vaccine (1) Never done    COVID-19 Vaccine (4 - 2023-24 season) 09/01/2023        Chart review done.   HM updated.   Immunizations reviewed & updated.   Care Everywhere updated.  [facility] reviewed  DIS reviewed

## 2024-01-05 ENCOUNTER — CLINICAL SUPPORT (OUTPATIENT)
Dept: REHABILITATION | Facility: HOSPITAL | Age: 54
End: 2024-01-05
Payer: OTHER MISCELLANEOUS

## 2024-01-05 DIAGNOSIS — M25.619 LIMITED RANGE OF MOTION (ROM) OF SHOULDER: ICD-10-CM

## 2024-01-05 DIAGNOSIS — M25.511 ACUTE PAIN OF RIGHT SHOULDER: Primary | ICD-10-CM

## 2024-01-05 PROCEDURE — 97530 THERAPEUTIC ACTIVITIES: CPT | Performed by: PHYSICAL THERAPIST

## 2024-01-05 NOTE — PROGRESS NOTES
Physical Therapy Daily Treatment Note     Name: Holly HEREDIA [facility] Number: [id number]  Therapy Diagnosis:        Encounter Diagnoses   Name Primary?    S/P right rotator cuff repair      Limited range of motion (ROM) of shoulder          Physician: Yessi Hahn MD     Physician Orders: PT Eval and Treat   Medical Diagnosis from Referral: M75.101 (ICD-10-CM) - Nontraumatic rotator cuff tear, right  Evaluation Date: 10/19/2023  Authorization Period Expiration: 9/24/2024  Plan of Care Expiration: 2/19/2024  Progress Note Due: 4/19/2024  Visit # / Visits authorized: 10/12  FOTO: 1/1     Precautions: Standard      Time In: 0808  Time Out: 900  Total Appointment Time (timed & untimed codes): 52 minutes     POSTOPERATIVE PLAN: We will follow the arthroscopic rotator cuff repair guidelines for a small size rotator cuff tear.  We discussed with the patient's family after surgery.  The patient will remain in a sling for 6 weeks.  PT to start at 1-2 weeks.     Cuff specific program:  Pendulum exercises and Codman's exercises in 5-7 days, protecting rotator cuff repair for 1 week by avoiding active motion program until 1 week.     PASSIVE ROM: ER side 30 degrees, Forward Flex 90 degrees, ABD - 60 degrees   Full AAROM/PROM starting at 5-7 days as tolerated         Quality of tissue: fair      Quality of the repair: good       Size of tear: 10 x 15 mm, 60% partial thickness bursal     Subjective     Pt reports: Dr Hahn said I would see him in 3 months. Shoulder is slowly getting better  She was compliant with home exercise program.  Response to previous treatment: soreness   Functional change: improved functional mobility, but still painful 90'     Pain: 6/10  Location: right shoulder      Objective     Holly received therapeutic exercises to develop strength, endurance, ROM, flexibility, posture, and core stabilization for 0 minutes including:      Holly received the following manual therapy techniques: Joint mobilizations,  "Manual traction, Myofacial release, Manual Lymphatic Drainage, Soft tissue Mobilization, and Friction Massage were applied to the: R shld  for 0 minutes, including:  oscillations R shoulder pain relief  Scapular mobs   Flexion pain free range  PROM elbow extension  ER 10 deg arm by side       Holly participated in neuromuscular re-education activities to improve: Balance, Coordination, Kinesthetic, Sense, Proprioception, and Posture for 0 minutes. The following activities were included:  LLLD ER stretch 5# 5' - shld abd 45'   Slot machines 3x10  IR/ER walkouts OTB 3x10/3"  GTB rows 3x10      Holly participated in dynamic functional therapeutic activities to improve functional performance for 52  minutes, including:  UBE 5'/5'   Pulley shld flex/scaption 4'/4  Rows GTB with ER 3 x 10  IR/ER OTB conc/ecc 3 x 10  Scap extensions GTB 3 x 10      Home Exercises Provided and Patient Education Provided     Education provided:   - Compliance with HEP     Written Home Exercises Provided: yes.  Exercises were reviewed and Holly was able to demonstrate them prior to the end of the session.  Holly demonstrated good  understanding of the education provided.       Assessment   Progressed to conc/ecc work today with fatigue. Noted she had to remain very close to resistance band due to lacking strength, fatigued with extensions and compensates with elbow flexion     Holly Is progressing well towards her goals.   Pt prognosis is Good.     Pt will continue to benefit from skilled outpatient physical therapy to address the deficits listed in the problem list box on initial evaluation, provide pt/family education and to maximize pt's level of independence in the home and community environment.     Pt's spiritual, cultural and educational needs considered and pt agreeable to plan of care and goals.    Anticipated barriers to physical therapy: none     Goals: GOALS: Short Term Goals:  6 weeks  1.Report decreased shoulder pain < / =  " 2/10  to increase tolerance for return to ADLs out the sling(Progressing, not met)  2. Increase PROM 120 flexion and 25 ER to restore a more functional ROM (Progressing, not met)  3. Increased strength by 1/3 MMT grade in R shoulder scap stabilizers to increase tolerance for ADL and work activities.(Progressing, not met)  4. 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 Pt will have improved gcode of CJ (20-40% limited) on FOTO shoulder in order to demonstrate true functional improvement. (Progressing, not met)    Plan     Continue with POC to restore PROM, progressed MO De León, PT, STS

## 2024-01-08 ENCOUNTER — TELEPHONE (OUTPATIENT)
Dept: SPORTS MEDICINE | Facility: CLINIC | Age: 54
End: 2024-01-08
Payer: COMMERCIAL

## 2024-01-08 DIAGNOSIS — Z98.890 S/P RIGHT ROTATOR CUFF REPAIR: Primary | ICD-10-CM

## 2024-01-08 DIAGNOSIS — M25.511 RIGHT SHOULDER PAIN, UNSPECIFIED CHRONICITY: ICD-10-CM

## 2024-01-10 ENCOUNTER — CLINICAL SUPPORT (OUTPATIENT)
Dept: REHABILITATION | Facility: HOSPITAL | Age: 54
End: 2024-01-10
Payer: OTHER MISCELLANEOUS

## 2024-01-10 DIAGNOSIS — Z98.890 S/P RIGHT ROTATOR CUFF REPAIR: ICD-10-CM

## 2024-01-10 DIAGNOSIS — M25.511 RIGHT SHOULDER PAIN, UNSPECIFIED CHRONICITY: ICD-10-CM

## 2024-01-10 DIAGNOSIS — M25.619 LIMITED RANGE OF MOTION (ROM) OF SHOULDER: ICD-10-CM

## 2024-01-10 DIAGNOSIS — M25.511 ACUTE PAIN OF RIGHT SHOULDER: Primary | ICD-10-CM

## 2024-01-10 PROCEDURE — 97530 THERAPEUTIC ACTIVITIES: CPT | Performed by: PHYSICAL THERAPIST

## 2024-01-10 PROCEDURE — 97140 MANUAL THERAPY 1/> REGIONS: CPT | Performed by: PHYSICAL THERAPIST

## 2024-01-10 PROCEDURE — 97112 NEUROMUSCULAR REEDUCATION: CPT | Performed by: PHYSICAL THERAPIST

## 2024-01-10 NOTE — PROGRESS NOTES
Physical Therapy Daily Treatment Note     Name: Holly HEREDIA [facility] Number: [id number]  Therapy Diagnosis:        Encounter Diagnoses   Name Primary?    S/P right rotator cuff repair      Limited range of motion (ROM) of shoulder          Physician: Yessi Hahn MD     Physician Orders: PT Eval and Treat   Medical Diagnosis from Referral: M75.101 (ICD-10-CM) - Nontraumatic rotator cuff tear, right  Evaluation Date: 10/19/2023  Authorization Period Expiration: 7/9/2024  Plan of Care Expiration: 2/19/2024  Progress Note Due: 4/19/2024  Visit # / Visits authorized: 1/12  FOTO: 1/1     Precautions: Standard      Time In: 1100  Time Out: 1158  Total Appointment Time (timed & untimed codes): 58 minutes     POSTOPERATIVE PLAN: We will follow the arthroscopic rotator cuff repair guidelines for a small size rotator cuff tear.  We discussed with the patient's family after surgery.  The patient will remain in a sling for 6 weeks.  PT to start at 1-2 weeks.     Cuff specific program:  Pendulum exercises and Codman's exercises in 5-7 days, protecting rotator cuff repair for 1 week by avoiding active motion program until 1 week.     PASSIVE ROM: ER side 30 degrees, Forward Flex 90 degrees, ABD - 60 degrees   Full AAROM/PROM starting at 5-7 days as tolerated         Quality of tissue: fair      Quality of the repair: good       Size of tear: 10 x 15 mm, 60% partial thickness bursal     Subjective     Pt reports Shoulder feels worse in the cold. Notes it is slowly getting better     She was compliant with home exercise program.  Response to previous treatment: soreness   Functional change: improved functional mobility, but still painful 90'     Pain: 6/10  Location: right shoulder      Objective     Holly received therapeutic exercises to develop strength, endurance, ROM, flexibility, posture, and core stabilization for 0 minutes including:      Holly received the following manual therapy techniques: Joint mobilizations, Manual  traction, Myofacial release, Manual Lymphatic Drainage, Soft tissue Mobilization, and Friction Massage were applied to the: R shld  for 10 minutes, including:  oscillations R shoulder pain relief  Scapular mobs   Flexion pain free range  ER arm at 45 deg abd with centering glide  Inferior mob at 20 and 90 deg abd        Holly participated in neuromuscular re-education activities to improve: Balance, Coordination, Kinesthetic, Sense, Proprioception, and Posture for 14 minutes. The following activities were included:    Slot machines 2# 3x10  Sidelying ER 2 x 15    Holly participated in dynamic functional therapeutic activities to improve functional performance for 34  minutes, including:  UBE 5'/5'   Pulley shld flex/scaption 4'/4  Rows GTB with ER 3 x 10  IR/ER OTB conc/ecc 3 x 10        Home Exercises Provided and Patient Education Provided     Education provided:   - Compliance with HEP     Written Home Exercises Provided: yes.  Exercises were reviewed and Holly was able to demonstrate them prior to the end of the session.  Holly demonstrated good  understanding of the education provided.       Assessment     Progressing with functional reach on slot machine with 2# today. Able to progress conc/ecc loading with the OTB today. Needs to continue progressing cuff specific exercise, sidelying increasing towards 1# next visit    Holly Is progressing well towards her goals.   Pt prognosis is Good.     Pt will continue to benefit from skilled outpatient physical therapy to address the deficits listed in the problem list box on initial evaluation, provide pt/family education and to maximize pt's level of independence in the home and community environment.     Pt's spiritual, cultural and educational needs considered and pt agreeable to plan of care and goals.    Anticipated barriers to physical therapy: none     Goals: GOALS: Short Term Goals:  6 weeks  1.Report decreased shoulder pain < / =  2/10  to increase  tolerance for return to ADLs out the sling(Progressing, not met)  2. Increase PROM 120 flexion and 25 ER to restore a more functional ROM (Progressing, not met)  3. Increased strength by 1/3 MMT grade in R shoulder scap stabilizers to increase tolerance for ADL and work activities.(Progressing, not met)  4. 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 Pt will have improved gcode of CJ (20-40% limited) on FOTO shoulder in order to demonstrate true functional improvement. (Progressing, not met)    Plan     Continue with POC to restore PROM, progressed MO De León, PT, DPT, OCS, FAAOMPT

## 2024-01-12 ENCOUNTER — CLINICAL SUPPORT (OUTPATIENT)
Dept: REHABILITATION | Facility: HOSPITAL | Age: 54
End: 2024-01-12
Payer: OTHER MISCELLANEOUS

## 2024-01-12 DIAGNOSIS — M25.511 ACUTE PAIN OF RIGHT SHOULDER: Primary | ICD-10-CM

## 2024-01-12 DIAGNOSIS — M25.619 LIMITED RANGE OF MOTION (ROM) OF SHOULDER: ICD-10-CM

## 2024-01-12 PROCEDURE — 97140 MANUAL THERAPY 1/> REGIONS: CPT | Mod: CQ

## 2024-01-12 PROCEDURE — 97112 NEUROMUSCULAR REEDUCATION: CPT | Mod: CQ

## 2024-01-12 PROCEDURE — 97530 THERAPEUTIC ACTIVITIES: CPT | Mod: CQ

## 2024-01-12 NOTE — PROGRESS NOTES
Physical Therapy Daily Treatment Note     Name: Holly HEREDIA [facility] Number: [id number]  Therapy Diagnosis:        Encounter Diagnoses   Name Primary?    S/P right rotator cuff repair      Limited range of motion (ROM) of shoulder          Physician: Yessi Hahn MD     Physician Orders: PT Eval and Treat   Medical Diagnosis from Referral: M75.101 (ICD-10-CM) - Nontraumatic rotator cuff tear, right  Evaluation Date: 10/19/2023  Authorization Period Expiration: 7/9/2024  Plan of Care Expiration: 2/19/2024  Progress Note Due: 4/19/2024  Visit # / Visits authorized: 1/12  FOTO: 1/1     Precautions: Standard      Time In: 1000  Time Out: 1100  Total Appointment Time (timed & untimed codes): 58 minutes     POSTOPERATIVE PLAN: We will follow the arthroscopic rotator cuff repair guidelines for a small size rotator cuff tear.  We discussed with the patient's family after surgery.  The patient will remain in a sling for 6 weeks.  PT to start at 1-2 weeks.     Cuff specific program:  Pendulum exercises and Codman's exercises in 5-7 days, protecting rotator cuff repair for 1 week by avoiding active motion program until 1 week.     PASSIVE ROM: ER side 30 degrees, Forward Flex 90 degrees, ABD - 60 degrees   Full AAROM/PROM starting at 5-7 days as tolerated         Quality of tissue: fair      Quality of the repair: good       Size of tear: 10 x 15 mm, 60% partial thickness bursal     Subjective     Pt reports its still sore from last night. Stated throbbing last night and didn't get to sleep till 3 am.   She was compliant with home exercise program.  Response to previous treatment: soreness   Functional change: improved functional mobility, but still painful 90'     Pain: 4/10  Location: right shoulder      Objective     Holly received therapeutic exercises to develop strength, endurance, ROM, flexibility, posture, and core stabilization for 0 minutes including:      Holly received the following manual therapy techniques:  Joint mobilizations, Manual traction, Myofacial release, Manual Lymphatic Drainage, Soft tissue Mobilization, and Friction Massage were applied to the: R shld  for 10 minutes, including:  oscillations R shoulder pain relief  Scapular mobs   Flexion pain free range  ER arm at 45 deg abd with centering glide  Inferior mob at 20 and 90 deg abd        Holly participated in neuromuscular re-education activities to improve: Balance, Coordination, Kinesthetic, Sense, Proprioception, and Posture for 15minutes. The following activities were included:  Slot machines 2# 3x10  Sidelying ER 2 x 15 1#      Holly participated in dynamic functional therapeutic activities to improve functional performance for 35  minutes, including:  UBE 5'/5'   Pulley shld flex/scaption 4'/4  Standing wall c/cc [place] simulating turning wheel 3x30 ea   Rows GTB with ER 3 x 10  IR/ER OTB conc/ecc 3x10        Home Exercises Provided and Patient Education Provided     Education provided:   - Compliance with HEP     Written Home Exercises Provided: yes.  Exercises were reviewed and Holly was able to demonstrate them prior to the end of the session.  Holly demonstrated good  understanding of the education provided.       Assessment   Pt tolerating tx well. Continued with functional reaching   Progressing with functional reach on slot machine with 2# today. Able to progress conc/ecc loading with the OTB today. Needs to continue progressing cuff specific exercise, sidelying increasing towards 1# next visit    Holly Is progressing well towards her goals.   Pt prognosis is Good.     Pt will continue to benefit from skilled outpatient physical therapy to address the deficits listed in the problem list box on initial evaluation, provide pt/family education and to maximize pt's level of independence in the home and community environment.     Pt's spiritual, cultural and educational needs considered and pt agreeable to plan of care and  goals.    Anticipated barriers to physical therapy: none     Goals: GOALS: Short Term Goals:  6 weeks  1.Report decreased shoulder pain < / =  2/10  to increase tolerance for return to ADLs out the sling(Progressing, not met)  2. Increase PROM 120 flexion and 25 ER to restore a more functional ROM (Progressing, not met)  3. Increased strength by 1/3 MMT grade in R shoulder scap stabilizers to increase tolerance for ADL and work activities.(Progressing, not met)  4. 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 Pt will have improved gcode of CJ (20-40% limited) on FOTO shoulder in order to demonstrate true functional improvement. (Progressing, not met)    Plan     Continue with POC to restore PROM, progressed MO Freitas, PTA, STS

## 2024-01-17 NOTE — PROGRESS NOTES
Physical Therapy Daily Treatment Note     Name: Holly HEREDIA [facility] Number: [id number]  Therapy Diagnosis:        Encounter Diagnoses   Name Primary?    S/P right rotator cuff repair      Limited range of motion (ROM) of shoulder          Physician: Yessi Hahn MD     Physician Orders: PT Eval and Treat   Medical Diagnosis from Referral: M75.101 (ICD-10-CM) - Nontraumatic rotator cuff tear, right  Evaluation Date: 10/19/2023  Authorization Period Expiration: 9/24/2024  Plan of Care Expiration: 2/19/2024  Progress Note Due: 4/19/2024  Visit # / Visits authorized: 9/12  FOTO: 1/1     Precautions: Standard      Time In: 1000  Time Out: 1100  Total Appointment Time (timed & untimed codes): 60 minutes     POSTOPERATIVE PLAN: We will follow the arthroscopic rotator cuff repair guidelines for a small size rotator cuff tear.  We discussed with the patient's family after surgery.  The patient will remain in a sling for 6 weeks.  PT to start at 1-2 weeks.     Cuff specific program:  Pendulum exercises and Codman's exercises in 5-7 days, protecting rotator cuff repair for 1 week by avoiding active motion program until 1 week.     PASSIVE ROM: ER side 30 degrees, Forward Flex 90 degrees, ABD - 60 degrees   Full AAROM/PROM starting at 5-7 days as tolerated         Quality of tissue: fair      Quality of the repair: good       Size of tear: 10 x 15 mm, 60% partial thickness bursal     Subjective     Pt reports: shoulder pain is about the same. Increased pain over the weekend after swatting a bee.   She was compliant with home exercise program.  Response to previous treatment: soreness   Functional change: improved functional mobility     Pain: 4/10  Location: right shoulder      Objective     Holly received therapeutic exercises to develop strength, endurance, ROM, flexibility, posture, and core stabilization for 0 minutes including:      Holly received the following manual therapy techniques: Joint mobilizations, Manual  Multi-Disciplinary Problems (from Behavioral Health Treatment Plan)      Active Problems       Problem: Anxiety  Start Date: 01/17/24      Problem Details: The patient self-scales this problem as a 2 with 10 being the worst.          Goal Priority Start Date Expected End Date End Date    Patient will develop and implement behavioral and cognitive strategies to reduce anxiety and irrational fears. -- 01/17/24 -- --    Goal Details: Progress toward goal:  The patient self-scales their progress related to this goal as a 2 with 10 being the worst.          Goal Intervention Frequency Start Date End Date    Help patient explore past emotional issues in relation to present anxiety. Weekly 01/17/24 --    Intervention Details: Duration of treatment until until remission of symptoms.          Goal Intervention Frequency Start Date End Date    Help patient develop an awareness of their cognitive and physical responses to anxiety. Weekly 01/17/24 --    Intervention Details: Duration of treatment until until remission of symptoms.                                 I have discussed and reviewed this treatment plan with the patient.  It has been printed for signatures.      "traction, Myofacial release, Manual Lymphatic Drainage, Soft tissue Mobilization, and Friction Massage were applied to the: R shld  for 10 minutes, including:  oscillations R shoulder pain relief  Scapular mobs   Flexion pain free range  PROM elbow extension  ER 10 deg arm by side       Holly participated in neuromuscular re-education activities to improve: Balance, Coordination, Kinesthetic, Sense, Proprioception, and Posture for 15 minutes. The following activities were included:  Slot machines 3x10  IR/ER walkouts OTB 3x10      Holly participated in dynamic functional therapeutic activities to improve functional performance for 35  minutes, including:  UBE 5'/5'   Pulley shld flex/scaption 4'/4  Supine wand chest press plus 2x20  Supine wand flexion 3x10  Sidelying ER to neutral 3x10/3"  Sidelying flexion 3x10  Sidelying abd 3x10      Home Exercises Provided and Patient Education Provided     Education provided:   - Compliance with HEP     Written Home Exercises Provided: yes.  Exercises were reviewed and Holly was able to demonstrate them prior to the end of the session.  Holly demonstrated good  understanding of the education provided.       Assessment   Pt tolerating tx well. Continued with shld ROM, strengthening and stabilization for improved functional uise with decreased pain. VC/TC for correcting form/technique Will continue to progress scap stabilization as tolerated    Holly Is progressing well towards her goals.   Pt prognosis is Good.     Pt will continue to benefit from skilled outpatient physical therapy to address the deficits listed in the problem list box on initial evaluation, provide pt/family education and to maximize pt's level of independence in the home and community environment.     Pt's spiritual, cultural and educational needs considered and pt agreeable to plan of care and goals.    Anticipated barriers to physical therapy: none     Goals: GOALS: Short Term Goals:  6 " weeks  1.Report decreased shoulder pain < / =  2/10  to increase tolerance for return to ADLs out the sling(Progressing, not met)  2. Increase PROM 120 flexion and 25 ER to restore a more functional ROM (Progressing, not met)  3. Increased strength by 1/3 MMT grade in R shoulder scap stabilizers to increase tolerance for ADL and work activities.(Progressing, not met)  4. 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 Pt will have improved gcode of CJ (20-40% limited) on FOTO shoulder in order to demonstrate true functional improvement. (Progressing, not met)    Plan     Continue with POC to restore PROM, progressed MO Freitas, PTA, STS

## 2024-01-18 ENCOUNTER — OFFICE VISIT (OUTPATIENT)
Dept: PRIMARY CARE CLINIC | Facility: CLINIC | Age: 54
End: 2024-01-18
Payer: COMMERCIAL

## 2024-01-18 VITALS
DIASTOLIC BLOOD PRESSURE: 98 MMHG | HEART RATE: 88 BPM | WEIGHT: 202.5 LBS | RESPIRATION RATE: 18 BRPM | SYSTOLIC BLOOD PRESSURE: 130 MMHG | OXYGEN SATURATION: 98 % | TEMPERATURE: 97 F | BODY MASS INDEX: 40.82 KG/M2 | HEIGHT: 59 IN

## 2024-01-18 DIAGNOSIS — Z00.00 ANNUAL PHYSICAL EXAM: Primary | ICD-10-CM

## 2024-01-18 DIAGNOSIS — G89.29 CHRONIC RIGHT SHOULDER PAIN: ICD-10-CM

## 2024-01-18 DIAGNOSIS — Z00.00 WELL WOMAN EXAM WITHOUT GYNECOLOGICAL EXAM: ICD-10-CM

## 2024-01-18 DIAGNOSIS — M25.511 CHRONIC RIGHT SHOULDER PAIN: ICD-10-CM

## 2024-01-18 DIAGNOSIS — Z23 NEED FOR VACCINATION: ICD-10-CM

## 2024-01-18 DIAGNOSIS — I10 ESSENTIAL HYPERTENSION: ICD-10-CM

## 2024-01-18 PROCEDURE — 90686 IIV4 VACC NO PRSV 0.5 ML IM: CPT | Mod: S$GLB,,, | Performed by: STUDENT IN AN ORGANIZED HEALTH CARE EDUCATION/TRAINING PROGRAM

## 2024-01-18 PROCEDURE — 90471 IMMUNIZATION ADMIN: CPT | Mod: S$GLB,,, | Performed by: STUDENT IN AN ORGANIZED HEALTH CARE EDUCATION/TRAINING PROGRAM

## 2024-01-18 PROCEDURE — 99396 PREV VISIT EST AGE 40-64: CPT | Mod: 25,S$GLB,, | Performed by: STUDENT IN AN ORGANIZED HEALTH CARE EDUCATION/TRAINING PROGRAM

## 2024-01-18 PROCEDURE — 1159F MED LIST DOCD IN RCRD: CPT | Mod: CPTII,S$GLB,, | Performed by: STUDENT IN AN ORGANIZED HEALTH CARE EDUCATION/TRAINING PROGRAM

## 2024-01-18 PROCEDURE — 1160F RVW MEDS BY RX/DR IN RCRD: CPT | Mod: CPTII,S$GLB,, | Performed by: STUDENT IN AN ORGANIZED HEALTH CARE EDUCATION/TRAINING PROGRAM

## 2024-01-18 PROCEDURE — 99999 PR PBB SHADOW E&M-EST. PATIENT-LVL IV: CPT | Mod: PBBFAC,,, | Performed by: STUDENT IN AN ORGANIZED HEALTH CARE EDUCATION/TRAINING PROGRAM

## 2024-01-18 PROCEDURE — 3080F DIAST BP >= 90 MM HG: CPT | Mod: CPTII,S$GLB,, | Performed by: STUDENT IN AN ORGANIZED HEALTH CARE EDUCATION/TRAINING PROGRAM

## 2024-01-18 PROCEDURE — 3075F SYST BP GE 130 - 139MM HG: CPT | Mod: CPTII,S$GLB,, | Performed by: STUDENT IN AN ORGANIZED HEALTH CARE EDUCATION/TRAINING PROGRAM

## 2024-01-18 PROCEDURE — 3008F BODY MASS INDEX DOCD: CPT | Mod: CPTII,S$GLB,, | Performed by: STUDENT IN AN ORGANIZED HEALTH CARE EDUCATION/TRAINING PROGRAM

## 2024-01-18 RX ORDER — NAPROXEN 500 MG/1
500 TABLET ORAL 2 TIMES DAILY PRN
Qty: 60 TABLET | Refills: 3 | Status: SHIPPED | OUTPATIENT
Start: 2024-01-18 | End: 2024-05-08

## 2024-01-18 RX ORDER — AMLODIPINE BESYLATE 5 MG/1
5 TABLET ORAL DAILY
Qty: 90 TABLET | Refills: 3 | Status: SHIPPED | OUTPATIENT
Start: 2024-01-18

## 2024-01-18 NOTE — PROGRESS NOTES
"Subjective:       Patient ID: Holly Julian is a 53 y.o. female.    Chief Complaint: Follow-up (6 month )    HPI:  53 y.o. female presents to [facility] for 6 month follow-up visit    Acute concerns?: Patient reports right shoulder pain. Is still attending PT s/p arthroscopy 10/3/2023. Feels that pain has improved some, but still experiencing soreness. Following with Dr. Hahn Sports Medicine. Last saw 1/3/2024.    Recently tried to toss something in trash at home and brought to knees in pain.    Pain now is low if staying still, as soon as moves 4/10.    Severity?: 6/10 at worst, OK if still  Worsening factors?: Shoulder movments  Interventions?: OTC naproxen  Did interventions help?: Yes, partial  History of bariatric surgery, MI, renal disease, or GI bleed/ulcer?: No      Review of Systems   Constitutional:  Negative for chills, diaphoresis, fatigue and fever.   HENT:  Negative for congestion, sinus pressure, sneezing and sore throat.    Respiratory:  Negative for cough and shortness of breath.    Cardiovascular:  Negative for chest pain and palpitations.   Gastrointestinal:  Negative for abdominal pain, diarrhea, nausea and vomiting.   Musculoskeletal:  Positive for arthralgias (Right shoulder). Negative for joint swelling.   Skin:  Negative for rash and wound.   Neurological:  Negative for dizziness, light-headedness and headaches.       Objective:      Vitals:    01/18/24 0830   BP: (!) 120/98   BP Location: Right arm   Patient Position: Sitting   BP Method: Medium (Manual)   Pulse: 88   Resp: 18   Temp: 97.2 °F (36.2 °C)   TempSrc: Temporal   SpO2: 98%   Weight: 91.8 kg (202 lb 7.9 oz)   Height: 4' 11" (1.499 m)     Physical Exam  Vitals reviewed.   Constitutional:       General: She is not in acute distress.     Appearance: Normal appearance. She is not ill-appearing.   HENT:      Head: Normocephalic and atraumatic.   Eyes:      General:         Right eye: No discharge.         Left eye: No discharge.     " " Conjunctiva/sclera: Conjunctivae normal.   Cardiovascular:      Rate and Rhythm: Normal rate and regular rhythm.      Pulses: Normal pulses.      Heart sounds: No murmur heard.  Pulmonary:      Effort: Pulmonary effort is normal.      Breath sounds: Normal breath sounds.   Musculoskeletal:         General: No deformity.      Cervical back: Neck supple. No rigidity.   Lymphadenopathy:      Cervical: No cervical adenopathy.   Skin:     General: Skin is warm and dry.      Coloration: Skin is not jaundiced.   Neurological:      General: No focal deficit present.      Mental Status: She is alert and oriented to person, place, and time.   Psychiatric:         Mood and Affect: Mood normal.         Behavior: Behavior normal.             Lab Results   Component Value Date     09/26/2023    K 4.1 09/26/2023     09/26/2023    CO2 26 09/26/2023    BUN 13 09/26/2023    CREATININE 1.0 09/26/2023    ANIONGAP 8 09/26/2023     Lab Results   Component Value Date    HGBA1C 6.0 (H) 09/26/2023     No results found for: "BNP", "BNPTRIAGEBLO"    Lab Results   Component Value Date    WBC 4.99 09/26/2023    HGB 14.1 09/26/2023    HCT 43.7 09/26/2023     09/26/2023    GRAN 1.9 09/26/2023    GRAN 38.1 09/26/2023     Lab Results   Component Value Date    CHOL 229 (H) 09/26/2023    HDL 55 09/26/2023    LDLCALC 154.6 09/26/2023    TRIG 97 09/26/2023          Current Outpatient Medications:     amLODIPine (NORVASC) 5 MG tablet, Take 1 tablet (5 mg total) by mouth once daily., Disp: 90 tablet, Rfl: 3    naproxen (NAPROSYN) 500 MG tablet, Take 1 tablet (500 mg total) by mouth 2 (two) times daily as needed (Pain). Take with meal, Disp: 60 tablet, Rfl: 3        Assessment:       1. Annual physical exam    2. Chronic right shoulder pain    3. Essential hypertension    4. Well woman exam without gynecological exam    5. Need for vaccination           Plan:       Annual physical exam  - Health maintenance items reviewed today's " visit    Chronic right shoulder pain  -     naproxen (NAPROSYN) 500 MG tablet; Take 1 tablet (500 mg total) by mouth 2 (two) times daily as needed (Pain). Take with meal  Dispense: 60 tablet; Refill: 3  - Continue PT and keep follow-up with Sports Medicine    Essential hypertension  -     amLODIPine (NORVASC) 5 MG tablet; Take 1 tablet (5 mg total) by mouth once daily.  Dispense: 90 tablet; Refill: 3  - Continue lifestyle interventions    Well woman exam without gynecological exam  -     Ambulatory referral/consult to Gynecology; Future; Expected date: 01/25/2024    Need for vaccination  -     Influenza - Quadrivalent (PF)    RTC PRN  Ruddy Ho MD

## 2024-01-19 ENCOUNTER — CLINICAL SUPPORT (OUTPATIENT)
Dept: REHABILITATION | Facility: HOSPITAL | Age: 54
End: 2024-01-19
Payer: OTHER MISCELLANEOUS

## 2024-01-19 DIAGNOSIS — M25.619 LIMITED RANGE OF MOTION (ROM) OF SHOULDER: ICD-10-CM

## 2024-01-19 DIAGNOSIS — M25.511 ACUTE PAIN OF RIGHT SHOULDER: Primary | ICD-10-CM

## 2024-01-19 PROCEDURE — 97112 NEUROMUSCULAR REEDUCATION: CPT | Mod: CQ

## 2024-01-19 PROCEDURE — 97530 THERAPEUTIC ACTIVITIES: CPT | Mod: CQ

## 2024-01-19 NOTE — PROGRESS NOTES
"  Physical Therapy Daily Treatment Note     Name: Holly HEREDIA [facility] Number: [id number]  Therapy Diagnosis:        Encounter Diagnoses   Name Primary?    S/P right rotator cuff repair      Limited range of motion (ROM) of shoulder          Physician: Yessi Hahn MD     Physician Orders: PT Eval and Treat   Medical Diagnosis from Referral: M75.101 (ICD-10-CM) - Nontraumatic rotator cuff tear, right  Evaluation Date: 10/19/2023  Authorization Period Expiration: 7/9/2024  Plan of Care Expiration: 2/19/2024  Progress Note Due: 4/19/2024  Visit # / Visits authorized: 1/12  FOTO: 1/1     Precautions: Standard      Time In: 1100  Time Out: 1200  Total Appointment Time (timed & untimed codes): 58 minutes     POSTOPERATIVE PLAN: We will follow the arthroscopic rotator cuff repair guidelines for a small size rotator cuff tear.  We discussed with the patient's family after surgery.  The patient will remain in a sling for 6 weeks.  PT to start at 1-2 weeks.     Cuff specific program:  Pendulum exercises and Codman's exercises in 5-7 days, protecting rotator cuff repair for 1 week by avoiding active motion program until 1 week.     PASSIVE ROM: ER side 30 degrees, Forward Flex 90 degrees, ABD - 60 degrees   Full AAROM/PROM starting at 5-7 days as tolerated         Quality of tissue: fair      Quality of the repair: good       Size of tear: 10 x 15 mm, 60% partial thickness bursal     Subjective     Pt reports f/u with MD to get more pain medication, "this weather is killing me"  She was compliant with home exercise program.  Response to previous treatment: soreness   Functional change: improved functional mobility, but still painful 90'     Pain: 5/10  Location: right shoulder      Objective     Holly received therapeutic exercises to develop strength, endurance, ROM, flexibility, posture, and core stabilization for 0 minutes including:      Holly received the following manual therapy techniques: Joint mobilizations, Manual " "traction, Myofacial release, Manual Lymphatic Drainage, Soft tissue Mobilization, and Friction Massage were applied to the: R shld  for 0 minutes, including:  oscillations R shoulder pain relief  Scapular mobs   Flexion pain free range  ER arm at 45 deg abd with centering glide  Inferior mob at 20 and 90 deg abd        Holyl participated in neuromuscular re-education activities to improve: Balance, Coordination, Kinesthetic, Sense, Proprioception, and Posture for 15  minutes. The following activities were included:  Slot machines 2# 3x10  Sidelying ER 2 x 15 1#  OH OTB pulsing shld flexion 5x/30"       Holly participated in dynamic functional therapeutic activities to improve functional performance for 40  minutes, including:  UBE 5'/5'   Pulley shld flex/scaption 4'/4  Wall slides 3x10/3"  Standing wall c/cc [place] simulating turning wheel 3x30 ea   Rows GTB with ER 3 x 10  Row GTB 30x   IR/ER OTB conc/ecc 3x10        Home Exercises Provided and Patient Education Provided     Education provided:   - Compliance with HEP     Written Home Exercises Provided: yes.  Exercises were reviewed and Holly was able to demonstrate them prior to the end of the session.  Holly demonstrated good  understanding of the education provided.       Assessment   Pt tolerating tx well. Continued with functional reaching   Progressing with functional reach on slot machine with 2# today. Able to progress conc/ecc loading with the OTB today. Needs to continue progressing cuff specific exercise, sidelying increasing towards 1# next visit    Holly Is progressing well towards her goals.   Pt prognosis is Good.     Pt will continue to benefit from skilled outpatient physical therapy to address the deficits listed in the problem list box on initial evaluation, provide pt/family education and to maximize pt's level of independence in the home and community environment.     Pt's spiritual, cultural and educational needs considered and pt " agreeable to plan of care and goals.    Anticipated barriers to physical therapy: none     Goals: GOALS: Short Term Goals:  6 weeks  1.Report decreased shoulder pain < / =  2/10  to increase tolerance for return to ADLs out the sling(Progressing, not met)  2. Increase PROM 120 flexion and 25 ER to restore a more functional ROM (Progressing, not met)  3. Increased strength by 1/3 MMT grade in R shoulder scap stabilizers to increase tolerance for ADL and work activities.(Progressing, not met)  4. 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 Pt will have improved gcode of CJ (20-40% limited) on FOTO shoulder in order to demonstrate true functional improvement. (Progressing, not met)    Plan     Continue with POC to restore PROM, progressed MO Freitas, PTA, STS      68

## 2024-01-22 ENCOUNTER — CLINICAL SUPPORT (OUTPATIENT)
Dept: REHABILITATION | Facility: HOSPITAL | Age: 54
End: 2024-01-22
Payer: OTHER MISCELLANEOUS

## 2024-01-22 DIAGNOSIS — M25.619 LIMITED RANGE OF MOTION (ROM) OF SHOULDER: ICD-10-CM

## 2024-01-22 DIAGNOSIS — M25.511 ACUTE PAIN OF RIGHT SHOULDER: Primary | ICD-10-CM

## 2024-01-22 PROCEDURE — 97112 NEUROMUSCULAR REEDUCATION: CPT | Mod: CQ

## 2024-01-22 PROCEDURE — 97530 THERAPEUTIC ACTIVITIES: CPT | Mod: CQ

## 2024-01-22 NOTE — PROGRESS NOTES
"  Physical Therapy Daily Treatment Note     Name: Holly HEREDIA [facility] Number: [id number]  Therapy Diagnosis:        Encounter Diagnoses   Name Primary?    S/P right rotator cuff repair      Limited range of motion (ROM) of shoulder          Physician: Yessi Hahn MD     Physician Orders: PT Eval and Treat   Medical Diagnosis from Referral: M75.101 (ICD-10-CM) - Nontraumatic rotator cuff tear, right  Evaluation Date: 10/19/2023  Authorization Period Expiration: 7/9/2024  Plan of Care Expiration: 2/19/2024  Progress Note Due: 4/19/2024  Visit # / Visits authorized: 1/12  FOTO: 1/1     Precautions: Standard      Time In: 1100  Time Out: 1200  Total Appointment Time (timed & untimed codes): 58 minutes     POSTOPERATIVE PLAN: We will follow the arthroscopic rotator cuff repair guidelines for a small size rotator cuff tear.  We discussed with the patient's family after surgery.  The patient will remain in a sling for 6 weeks.  PT to start at 1-2 weeks.     Cuff specific program:  Pendulum exercises and Codman's exercises in 5-7 days, protecting rotator cuff repair for 1 week by avoiding active motion program until 1 week.     PASSIVE ROM: ER side 30 degrees, Forward Flex 90 degrees, ABD - 60 degrees   Full AAROM/PROM starting at 5-7 days as tolerated         Quality of tissue: fair      Quality of the repair: good       Size of tear: 10 x 15 mm, 60% partial thickness bursal     Subjective     Pt reports min pain today, "I told my doctor I need something else for pain".   She was compliant with home exercise program.  Response to previous treatment: soreness   Functional change: improved functional mobility, but still painful 90'     Pain: 4/10  Location: right shoulder      Objective     Holly received therapeutic exercises to develop strength, endurance, ROM, flexibility, posture, and core stabilization for 0 minutes including:      Holly received the following manual therapy techniques: Joint mobilizations, Manual " "traction, Myofacial release, Manual Lymphatic Drainage, Soft tissue Mobilization, and Friction Massage were applied to the: R shld  for 0 minutes, including:  oscillations R shoulder pain relief  Scapular mobs   Flexion pain free range  ER arm at 45 deg abd with centering glide  Inferior mob at 20 and 90 deg abd        Holly participated in neuromuscular re-education activities to improve: Balance, Coordination, Kinesthetic, Sense, Proprioception, and Posture for 15 minutes. The following activities were included:  CC shld flexion 30x eccentric control  CC shld scap 30x eccentric control     Not today   Slot machines 2# 3x10  Sidelying ER 2 x 15 1#  OH OTB pulsing shld flexion 5x/30"       Holly participated in dynamic functional therapeutic activities to improve functional performance for 40  minutes, including:  UBE 5'/5'   Pulley shld flex/scaption 4'/4  Wall slides 3x10/3"  Standing wall c/cc [place] simulating turning wheel 3x30 ea   Rows GTB with ER 3 x 10  Row GTB 30x   IR/ER OTB conc/ecc 3x10      Home Exercises Provided and Patient Education Provided     Education provided:   - Compliance with HEP     Written Home Exercises Provided: yes.  Exercises were reviewed and Holly was able to demonstrate them prior to the end of the session.  Holly demonstrated good  understanding of the education provided.       Assessment   Pt tolerating tx well with addition for CC shld flexion and scaption for improving ROM. VC/TC for eccentric control and stretch end range.   Needs to continue progressing cuff specific exercise, sidelying increasing towards 1# next visit.     Holly Is progressing well towards her goals.   Pt prognosis is Good.     Pt will continue to benefit from skilled outpatient physical therapy to address the deficits listed in the problem list box on initial evaluation, provide pt/family education and to maximize pt's level of independence in the home and community environment.     Pt's spiritual, " cultural and educational needs considered and pt agreeable to plan of care and goals.    Anticipated barriers to physical therapy: none     Goals: GOALS: Short Term Goals:  6 weeks  1.Report decreased shoulder pain < / =  2/10  to increase tolerance for return to ADLs out the sling(Progressing, not met)  2. Increase PROM 120 flexion and 25 ER to restore a more functional ROM (Progressing, not met)  3. Increased strength by 1/3 MMT grade in R shoulder scap stabilizers to increase tolerance for ADL and work activities.(Progressing, not met)  4. 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 Pt will have improved gcode of CJ (20-40% limited) on FOTO shoulder in order to demonstrate true functional improvement. (Progressing, not met)    Plan     Continue with POC to restore PROM, progressed MO Freitas, PTA, STS

## 2024-01-26 ENCOUNTER — CLINICAL SUPPORT (OUTPATIENT)
Dept: REHABILITATION | Facility: HOSPITAL | Age: 54
End: 2024-01-26
Payer: OTHER MISCELLANEOUS

## 2024-01-26 DIAGNOSIS — M25.511 ACUTE PAIN OF RIGHT SHOULDER: Primary | ICD-10-CM

## 2024-01-26 DIAGNOSIS — M25.619 LIMITED RANGE OF MOTION (ROM) OF SHOULDER: ICD-10-CM

## 2024-01-26 PROCEDURE — 97140 MANUAL THERAPY 1/> REGIONS: CPT | Mod: CQ

## 2024-01-26 PROCEDURE — 97112 NEUROMUSCULAR REEDUCATION: CPT | Mod: CQ

## 2024-01-26 PROCEDURE — 97530 THERAPEUTIC ACTIVITIES: CPT | Mod: CQ

## 2024-01-26 NOTE — PROGRESS NOTES
Physical Therapy Daily Treatment Note     Name: Holly HEREDIA [facility] Number: [id number]  Therapy Diagnosis:        Encounter Diagnoses   Name Primary?    S/P right rotator cuff repair      Limited range of motion (ROM) of shoulder          Physician: Yessi Hahn MD     Physician Orders: PT Eval and Treat   Medical Diagnosis from Referral: M75.101 (ICD-10-CM) - Nontraumatic rotator cuff tear, right  Evaluation Date: 10/19/2023  Authorization Period Expiration: 7/9/2024  Plan of Care Expiration: 2/19/2024  Progress Note Due: 4/19/2024  Visit # / Visits authorized: 1/12  FOTO: 1/1     Precautions: Standard      Time In: 1055  Time Out: 1155  Total Appointment Time (timed & untimed codes): 58 minutes     POSTOPERATIVE PLAN: We will follow the arthroscopic rotator cuff repair guidelines for a small size rotator cuff tear.  We discussed with the patient's family after surgery.  The patient will remain in a sling for 6 weeks.  PT to start at 1-2 weeks.     Cuff specific program:  Pendulum exercises and Codman's exercises in 5-7 days, protecting rotator cuff repair for 1 week by avoiding active motion program until 1 week.     PASSIVE ROM: ER side 30 degrees, Forward Flex 90 degrees, ABD - 60 degrees   Full AAROM/PROM starting at 5-7 days as tolerated         Quality of tissue: fair      Quality of the repair: good       Size of tear: 10 x 15 mm, 60% partial thickness bursal     Subjective     Pt reports mod pain today, always in the morning and night time it still wakes me up  She was compliant with home exercise program.  Response to previous treatment: soreness   Functional change: improved functional mobility, but still painful 90'     Pain: 4/10  Location: right shoulder      Objective     Holly received therapeutic exercises to develop strength, endurance, ROM, flexibility, posture, and core stabilization for 0 minutes including:      Holly received the following manual therapy techniques: Joint mobilizations,  "Manual traction, Myofacial release, Manual Lymphatic Drainage, Soft tissue Mobilization, and Friction Massage were applied to the: R shld  for 10 minutes, including:  oscillations R shoulder pain relief  Scapular mobs   Flexion pain free range  ER arm at 45 deg abd with centering glide  Inferior mob at 20 and 90 deg abd        Holly participated in neuromuscular re-education activities to improve: Balance, Coordination, Kinesthetic, Sense, Proprioception, and Posture for 15 minutes. The following activities were included:  CC shld flexion 30x eccentric control  CC shld scap 30x eccentric control     Not today   Slot machines 2# 3x10  Sidelying ER 2 x 15 1#  OH OTB pulsing shld flexion 5x/30"       Holly participated in dynamic functional therapeutic activities to improve functional performance for 35  minutes, including:  UBE 5'/5'   Pulley shld flex/scaption 4'/4  Wall slides 3x10/3"  Standing wall c/cc [place] simulating turning wheel 3x30 ea   Rows GTB with ER 3 x 10  Row GTB 30x   IR/ER OTB conc/ecc 3x10      Home Exercises Provided and Patient Education Provided     Education provided:   - Compliance with HEP     Written Home Exercises Provided: yes.  Exercises were reviewed and Holly was able to demonstrate them prior to the end of the session.  Holly demonstrated good  understanding of the education provided.       Assessment   Pt tolerating tx well with addition for CC shld flexion and scaption for improving ROM. VC/TC for eccentric control and stretch end range. VC/TC for correcting form/technique. Continue to progress as tolerated.     Holly Is progressing well towards her goals.   Pt prognosis is Good.     Pt will continue to benefit from skilled outpatient physical therapy to address the deficits listed in the problem list box on initial evaluation, provide pt/family education and to maximize pt's level of independence in the home and community environment.     Pt's spiritual, cultural and " educational needs considered and pt agreeable to plan of care and goals.    Anticipated barriers to physical therapy: none     Goals: GOALS: Short Term Goals:  6 weeks  1.Report decreased shoulder pain < / =  2/10  to increase tolerance for return to ADLs out the sling(Progressing, not met)  2. Increase PROM 120 flexion and 25 ER to restore a more functional ROM (Progressing, not met)  3. Increased strength by 1/3 MMT grade in R shoulder scap stabilizers to increase tolerance for ADL and work activities.(Progressing, not met)  4. 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 Pt will have improved gcode of CJ (20-40% limited) on FOTO shoulder in order to demonstrate true functional improvement. (Progressing, not met)    Plan     Continue with POC to restore PROM, progressed MO Freitas, PTA, STS

## 2024-01-31 ENCOUNTER — CLINICAL SUPPORT (OUTPATIENT)
Dept: REHABILITATION | Facility: HOSPITAL | Age: 54
End: 2024-01-31
Payer: OTHER MISCELLANEOUS

## 2024-01-31 DIAGNOSIS — M25.511 ACUTE PAIN OF RIGHT SHOULDER: Primary | ICD-10-CM

## 2024-01-31 DIAGNOSIS — M25.619 LIMITED RANGE OF MOTION (ROM) OF SHOULDER: ICD-10-CM

## 2024-01-31 PROCEDURE — 97530 THERAPEUTIC ACTIVITIES: CPT | Performed by: PHYSICAL THERAPIST

## 2024-01-31 NOTE — PROGRESS NOTES
Physical Therapy Daily Treatment Note     Name: Holly HEREDIA [facility] Number: [id number]  Therapy Diagnosis:        Encounter Diagnoses   Name Primary?    S/P right rotator cuff repair      Limited range of motion (ROM) of shoulder          Physician: Yessi Hahn MD     Physician Orders: PT Eval and Treat   Medical Diagnosis from Referral: M75.101 (ICD-10-CM) - Nontraumatic rotator cuff tear, right  Evaluation Date: 10/19/2023  Authorization Period Expiration: 7/9/2024  Plan of Care Expiration: 2/19/2024  Progress Note Due: 4/19/2024  Visit # / Visits authorized: 6/12  FOTO: 1/1     Precautions: Standard      Time In: 955  Time Out: 1055  Total Appointment Time (timed & untimed codes): 60 minutes     POSTOPERATIVE PLAN: We will follow the arthroscopic rotator cuff repair guidelines for a small size rotator cuff tear.  We discussed with the patient's family after surgery.  The patient will remain in a sling for 6 weeks.  PT to start at 1-2 weeks.     Cuff specific program:  Pendulum exercises and Codman's exercises in 5-7 days, protecting rotator cuff repair for 1 week by avoiding active motion program until 1 week.     PASSIVE ROM: ER side 30 degrees, Forward Flex 90 degrees, ABD - 60 degrees   Full AAROM/PROM starting at 5-7 days as tolerated         Quality of tissue: fair      Quality of the repair: good       Size of tear: 10 x 15 mm, 60% partial thickness bursal     Subjective     Pt reports I know I couldn't drive today if I had to  She was compliant with home exercise program.  Response to previous treatment: soreness   Functional change: improved functional mobility, but still painful 90'     Pain: 4/10  Location: right shoulder      Objective     Holly received therapeutic exercises to develop strength, endurance, ROM, flexibility, posture, and core stabilization for 0 minutes including:      Holly received the following manual therapy techniques: Joint mobilizations, Manual traction, Myofacial release,  "Manual Lymphatic Drainage, Soft tissue Mobilization, and Friction Massage were applied to the: R shld  for 0 minutes, including:  oscillations R shoulder pain relief  Scapular mobs   Flexion pain free range  ER arm at 45 deg abd with centering glide  Inferior mob at 20 and 90 deg abd        Holly participated in neuromuscular re-education activities to improve: Balance, Coordination, Kinesthetic, Sense, Proprioception, and Posture for 15 minutes. The following activities were included:  Sidelying ER 2 x 15 1#  Sidelying fleixon 2 x 15 1#    Not today   CC shld flexion 30x eccentric control  CC shld scap 30x eccentric control   Slot machines 2# 3x10    OH OTB pulsing shld flexion 5x/30"       Holly participated in dynamic functional therapeutic activities to improve functional performance for 45  minutes, including:  UBE 5'/5'   Pulley shld flex/scaption 4'/4  Wall slides 3x10/3"  Standing wall c/cc [place] simulating turning wheel 3x30 ea -nt  Rows GTB with ER 3 x 10  Row GTB 30x   IR/ER OTB conc/ecc 3x10      Home Exercises Provided and Patient Education Provided     Education provided:   - Compliance with HEP     Written Home Exercises Provided: yes.  Exercises were reviewed and Holly was able to demonstrate them prior to the end of the session.  Holly demonstrated good  understanding of the education provided.       Assessment   Reassessed patient strength at the start of session which was very inconsistent with effort. Active flexion 100 degrees but able to reach up and grab cables later in session. Progressed cuff strengthening as tolerated today    Holly Is progressing well towards her goals.   Pt prognosis is Good.     Pt will continue to benefit from skilled outpatient physical therapy to address the deficits listed in the problem list box on initial evaluation, provide pt/family education and to maximize pt's level of independence in the home and community environment.     Pt's spiritual, cultural and " educational needs considered and pt agreeable to plan of care and goals.    Anticipated barriers to physical therapy: none     Goals: GOALS: Short Term Goals:  6 weeks  1.Report decreased shoulder pain < / =  2/10  to increase tolerance for return to ADLs out the sling(Progressing, not met)  2. Increase PROM 120 flexion and 25 ER to restore a more functional ROM (Progressing, not met)  3. Increased strength by 1/3 MMT grade in R shoulder scap stabilizers to increase tolerance for ADL and work activities.(Progressing, not met)  4. 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 Pt will have improved gcode of CJ (20-40% limited) on FOTO shoulder in order to demonstrate true functional improvement. (Progressing, not met)    Plan     Continue with POC to restore PROM, progressed MO De León, PT, DPT

## 2024-02-02 ENCOUNTER — CLINICAL SUPPORT (OUTPATIENT)
Dept: REHABILITATION | Facility: HOSPITAL | Age: 54
End: 2024-02-02
Payer: OTHER MISCELLANEOUS

## 2024-02-02 DIAGNOSIS — M25.619 LIMITED RANGE OF MOTION (ROM) OF SHOULDER: ICD-10-CM

## 2024-02-02 DIAGNOSIS — M25.511 ACUTE PAIN OF RIGHT SHOULDER: Primary | ICD-10-CM

## 2024-02-02 PROCEDURE — 97530 THERAPEUTIC ACTIVITIES: CPT | Mod: CQ

## 2024-02-02 PROCEDURE — 97112 NEUROMUSCULAR REEDUCATION: CPT | Mod: CQ

## 2024-02-02 NOTE — PROGRESS NOTES
Physical Therapy Daily Treatment Note     Name: Holly HEREDIA [facility] Number: [id number]  Therapy Diagnosis:        Encounter Diagnoses   Name Primary?    S/P right rotator cuff repair      Limited range of motion (ROM) of shoulder          Physician: Yessi Hahn MD     Physician Orders: PT Eval and Treat   Medical Diagnosis from Referral: M75.101 (ICD-10-CM) - Nontraumatic rotator cuff tear, right  Evaluation Date: 10/19/2023  Authorization Period Expiration: 7/9/2024  Plan of Care Expiration: 2/19/2024  Progress Note Due: 4/19/2024  Visit # / Visits authorized: 6/12  FOTO: 1/1     Precautions: Standard      Time In: 1050  Time Out: 1150  Total Appointment Time (timed & untimed codes): 60 minutes     POSTOPERATIVE PLAN: We will follow the arthroscopic rotator cuff repair guidelines for a small size rotator cuff tear.  We discussed with the patient's family after surgery.  The patient will remain in a sling for 6 weeks.  PT to start at 1-2 weeks.     Cuff specific program:  Pendulum exercises and Codman's exercises in 5-7 days, protecting rotator cuff repair for 1 week by avoiding active motion program until 1 week.     PASSIVE ROM: ER side 30 degrees, Forward Flex 90 degrees, ABD - 60 degrees   Full AAROM/PROM starting at 5-7 days as tolerated         Quality of tissue: fair      Quality of the repair: good       Size of tear: 10 x 15 mm, 60% partial thickness bursal     Subjective     Pt reports no pain unless I'm reaching a certain way or making a turning motion   She was compliant with home exercise program.  Response to previous treatment: soreness   Functional change: improved functional mobility, but still painful 90'     Pain: 4/10 with movement   Location: right shoulder      Objective     Holly received therapeutic exercises to develop strength, endurance, ROM, flexibility, posture, and core stabilization for 0 minutes including:      Holly received the following manual therapy techniques: Joint  "mobilizations, Manual traction, Myofacial release, Manual Lymphatic Drainage, Soft tissue Mobilization, and Friction Massage were applied to the: R shld  for 0 minutes, including:  oscillations R shoulder pain relief  Scapular mobs   Flexion pain free range  ER arm at 45 deg abd with centering glide  Inferior mob at 20 and 90 deg abd        Holly participated in neuromuscular re-education activities to improve: Balance, Coordination, Kinesthetic, Sense, Proprioception, and Posture for 15 minutes. The following activities were included:  Sidelying ER 2 x 15 1#  Sidelying shld abd 2x15 1#   Sidelying fleixon 2 x 15 1#    Not today   CC shld flexion 30x eccentric control  CC shld scap 30x eccentric control   Slot machines 2# 3x10    OH OTB pulsing shld flexion 5x/30"       Holly participated in dynamic functional therapeutic activities to improve functional performance for 45  minutes, including:  UBE 5'/5'   Pulley shld flex/scaption 4'/4  Wall slides 3x10/3"  Standing wall c/cc [place] simulating turning wheel 3x30 ea   Rows GTB with ER 3 x 10  Row GTB 30x   IR/ER OTB conc/ecc 3x10      Home Exercises Provided and Patient Education Provided     Education provided:   - Compliance with HEP     Written Home Exercises Provided: yes.  Exercises were reviewed and Holly was able to demonstrate them prior to the end of the session.  Holly demonstrated good  understanding of the education provided.       Assessment   Pt tolerating tx well. 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  Progressed cuff strengthening as tolerated today.     Holly Is progressing well towards her goals.   Pt prognosis is Good.     Pt will continue to benefit from skilled outpatient physical therapy to address the deficits listed in the problem list box on initial evaluation, provide pt/family education and to maximize pt's level of independence in the home and community environment. "     Pt's spiritual, cultural and educational needs considered and pt agreeable to plan of care and goals.    Anticipated barriers to physical therapy: none     Goals: GOALS: Short Term Goals:  6 weeks  1.Report decreased shoulder pain < / =  2/10  to increase tolerance for return to ADLs out the sling(Progressing, not met)  2. Increase PROM 120 flexion and 25 ER to restore a more functional ROM (Progressing, not met)  3. Increased strength by 1/3 MMT grade in R shoulder scap stabilizers to increase tolerance for ADL and work activities.(Progressing, not met)  4. 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 Pt will have improved gcode of CJ (20-40% limited) on FOTO shoulder in order to demonstrate true functional improvement. (Progressing, not met)    Plan     Continue with POC to restore PROM, progressed MO Freitas, PTA, STS

## 2024-02-07 ENCOUNTER — CLINICAL SUPPORT (OUTPATIENT)
Dept: REHABILITATION | Facility: HOSPITAL | Age: 54
End: 2024-02-07
Payer: OTHER MISCELLANEOUS

## 2024-02-07 DIAGNOSIS — M25.619 LIMITED RANGE OF MOTION (ROM) OF SHOULDER: ICD-10-CM

## 2024-02-07 DIAGNOSIS — M25.511 ACUTE PAIN OF RIGHT SHOULDER: Primary | ICD-10-CM

## 2024-02-07 PROCEDURE — 97140 MANUAL THERAPY 1/> REGIONS: CPT | Mod: CQ

## 2024-02-07 PROCEDURE — 97112 NEUROMUSCULAR REEDUCATION: CPT | Mod: CQ

## 2024-02-07 PROCEDURE — 97530 THERAPEUTIC ACTIVITIES: CPT | Mod: CQ

## 2024-02-07 NOTE — PROGRESS NOTES
"  Physical Therapy Daily Treatment Note     Name: Holly HEREDIA [facility] Number: [id number]  Therapy Diagnosis:        Encounter Diagnoses   Name Primary?    S/P right rotator cuff repair      Limited range of motion (ROM) of shoulder          Physician: Yessi Hahn MD     Physician Orders: PT Eval and Treat   Medical Diagnosis from Referral: M75.101 (ICD-10-CM) - Nontraumatic rotator cuff tear, right  Evaluation Date: 10/19/2023  Authorization Period Expiration: 7/9/2024  Plan of Care Expiration: 2/19/2024  Progress Note Due: 4/19/2024  Visit # / Visits authorized: 6/12  FOTO: 1/1     Precautions: Standard      Time In: 1100  Time Out: 1155  Total Appointment Time (timed & untimed codes): 60 minutes     POSTOPERATIVE PLAN: We will follow the arthroscopic rotator cuff repair guidelines for a small size rotator cuff tear.  We discussed with the patient's family after surgery.  The patient will remain in a sling for 6 weeks.  PT to start at 1-2 weeks.     Cuff specific program:  Pendulum exercises and Codman's exercises in 5-7 days, protecting rotator cuff repair for 1 week by avoiding active motion program until 1 week.     PASSIVE ROM: ER side 30 degrees, Forward Flex 90 degrees, ABD - 60 degrees   Full AAROM/PROM starting at 5-7 days as tolerated         Quality of tissue: fair      Quality of the repair: good       Size of tear: 10 x 15 mm, 60% partial thickness bursal     Subjective     Pt reports pain is about the same as the last time here. "Still having problems raising it up"  She was compliant with home exercise program.  Response to previous treatment: soreness   Functional change: improved functional mobility, but still painful 90'     Pain: 4/10 with movement   Location: right shoulder      Objective     Holly received therapeutic exercises to develop strength, endurance, ROM, flexibility, posture, and core stabilization for 0 minutes including:      Holly received the following manual therapy techniques: " "Joint mobilizations, Manual traction, Myofacial release, Manual Lymphatic Drainage, Soft tissue Mobilization, and Friction Massage were applied to the: R shld  for 10  minutes, including:  Scapular mobs, oscillation,    Posterior capsular stretch, MET shld horizontal abd        Holly participated in neuromuscular re-education activities to improve: Balance, Coordination, Kinesthetic, Sense, Proprioception, and Posture for 15 minutes. The following activities were included:  Incline plinth shld taps 2x20  Sidelying ER 2 x 15 1#  Sidelying shld abd 2x15 1#   Sidelying fleixon 2 x 15 1#    Not today   CC shld flexion 30x eccentric control  CC shld scap 30x eccentric control   Slot machines 2# 3x10    OH OTB pulsing shld flexion 5x/30"       Holly participated in dynamic functional therapeutic activities to improve functional performance for 25  minutes, including:  UBE 5'/5'   [place] shld flex/scaption 5'/5  Wall slides 3x10/3"  Standing wall c/cc [place] simulating turning wheel 3x30 ea   Rows GTB with ER 3 x 10  Row GTB 30x   IR/ER OTB conc/ecc 3x10    RICE 10'   Home Exercises Provided and Patient Education Provided     Education provided:   - Compliance with HEP     Written Home Exercises Provided: yes.  Exercises were reviewed and Holly was able to demonstrate them prior to the end of the session.  Holly demonstrated good  understanding of the education provided.       Assessment   Pt tolerating tx fair. Increased pain with shld flex and abd.   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 Continued with cuff strengthening as tolerated today. RICE after tx for pain management.     Holly Is progressing well towards her goals.   Pt prognosis is Good.     Pt will continue to benefit from skilled outpatient physical therapy to address the deficits listed in the problem list box on initial evaluation, provide pt/family education and to maximize pt's level of " independence in the home and community environment.     Pt's spiritual, cultural and educational needs considered and pt agreeable to plan of care and goals.    Anticipated barriers to physical therapy: none     Goals: GOALS: Short Term Goals:  6 weeks  1.Report decreased shoulder pain < / =  2/10  to increase tolerance for return to ADLs out the sling(Progressing, not met)  2. Increase PROM 120 flexion and 25 ER to restore a more functional ROM (Progressing, not met)  3. Increased strength by 1/3 MMT grade in R shoulder scap stabilizers to increase tolerance for ADL and work activities.(Progressing, not met)  4. 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 Pt will have improved gcode of CJ (20-40% limited) on FOTO shoulder in order to demonstrate true functional improvement. (Progressing, not met)    Plan     Continue with POC to restore PROM, progressed MO Freitas, PTA, STS

## 2024-02-09 ENCOUNTER — CLINICAL SUPPORT (OUTPATIENT)
Dept: REHABILITATION | Facility: HOSPITAL | Age: 54
End: 2024-02-09
Payer: OTHER MISCELLANEOUS

## 2024-02-09 DIAGNOSIS — M25.619 LIMITED RANGE OF MOTION (ROM) OF SHOULDER: ICD-10-CM

## 2024-02-09 DIAGNOSIS — M25.511 ACUTE PAIN OF RIGHT SHOULDER: Primary | ICD-10-CM

## 2024-02-09 PROCEDURE — 97112 NEUROMUSCULAR REEDUCATION: CPT | Mod: CQ

## 2024-02-09 PROCEDURE — 97140 MANUAL THERAPY 1/> REGIONS: CPT | Mod: CQ

## 2024-02-09 PROCEDURE — 97530 THERAPEUTIC ACTIVITIES: CPT | Mod: CQ

## 2024-02-09 NOTE — PROGRESS NOTES
Physical Therapy Daily Treatment Note     Name: Holly HEREDIA [facility] Number: [id number]  Therapy Diagnosis:        Encounter Diagnoses   Name Primary?    S/P right rotator cuff repair      Limited range of motion (ROM) of shoulder          Physician: Yessi Hahn MD     Physician Orders: PT Eval and Treat   Medical Diagnosis from Referral: M75.101 (ICD-10-CM) - Nontraumatic rotator cuff tear, right  Evaluation Date: 10/19/2023  Authorization Period Expiration: 7/9/2024  Plan of Care Expiration: 2/19/2024  Progress Note Due: 4/19/2024  Visit # / Visits authorized: 6/12  FOTO: 1/1     Precautions: Standard      Time In: 0800  Time Out: 0900  Total Appointment Time (timed & untimed codes): 60 minutes     POSTOPERATIVE PLAN: We will follow the arthroscopic rotator cuff repair guidelines for a small size rotator cuff tear.  We discussed with the patient's family after surgery.  The patient will remain in a sling for 6 weeks.  PT to start at 1-2 weeks.     Cuff specific program:  Pendulum exercises and Codman's exercises in 5-7 days, protecting rotator cuff repair for 1 week by avoiding active motion program until 1 week.     PASSIVE ROM: ER side 30 degrees, Forward Flex 90 degrees, ABD - 60 degrees   Full AAROM/PROM starting at 5-7 days as tolerated         Quality of tissue: fair      Quality of the repair: good       Size of tear: 10 x 15 mm, 60% partial thickness bursal     Subjective     Pt reports it about the same   She was compliant with home exercise program.  Response to previous treatment: soreness   Functional change: improved functional mobility, but still painful 90' flex/abd    Pain: 4-5/10 with movement   Location: right shoulder      Objective     Holly received therapeutic exercises to develop strength, endurance, ROM, flexibility, posture, and core stabilization for 0 minutes including:      Holly received the following manual therapy techniques: Joint mobilizations, Manual traction, Myofacial  "release, Manual Lymphatic Drainage, Soft tissue Mobilization, and Friction Massage were applied to the: R shld  for 10  minutes, including:  Scapular mobs, oscillation,    Posterior capsular stretch, MET shld horizontal abd        Holly participated in neuromuscular re-education activities to improve: Balance, Coordination, Kinesthetic, Sense, Proprioception, and Posture for 15 minutes. The following activities were included:  Incline plinth shld taps 2x20  Sidelying ER 30 1#  Sidelying shld abd 2x15 1#   Sidelying fleixon 2 x 15 1#    Not today   CC shld flexion 30x eccentric control  CC shld scap 30x eccentric control   Slot machines 2# 3x10    OH OTB pulsing shld flexion 5x/30"       Holly participated in dynamic functional therapeutic activities to improve functional performance for 25  minutes, including:  UBE 5'/5'   Pulley shld flex/scaption 5'/5  Wall slides 3x10/3"  Standing wall c/cc [place] simulating turning wheel 3x30 ea   Rows GTB with ER 3 x 10  Row GTB 30x   IR/ER OTB conc/ecc 3x10    RICE 10'   Home Exercises Provided and Patient Education Provided     Education provided:   - Compliance with HEP     Written Home Exercises Provided: yes.  Exercises were reviewed and Holly was able to demonstrate them prior to the end of the session.  Holly demonstrated good  understanding of the education provided.       Assessment   Pt tolerating tx fair. Increased pain with shld flex and abd.   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 Continued with cuff strengthening as tolerated today. RICE after tx for pain management.     Holly Is progressing well towards her goals.   Pt prognosis is Good.     Pt will continue to benefit from skilled outpatient physical therapy to address the deficits listed in the problem list box on initial evaluation, provide pt/family education and to maximize pt's level of independence in the home and community environment. "     Pt's spiritual, cultural and educational needs considered and pt agreeable to plan of care and goals.    Anticipated barriers to physical therapy: none     Goals: GOALS: Short Term Goals:  6 weeks  1.Report decreased shoulder pain < / =  2/10  to increase tolerance for return to ADLs out the sling(Progressing, not met)  2. Increase PROM 120 flexion and 25 ER to restore a more functional ROM (Progressing, not met)  3. Increased strength by 1/3 MMT grade in R shoulder scap stabilizers to increase tolerance for ADL and work activities.(Progressing, not met)  4. 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 Pt goal: return to driving the bus at [employer](Progressing, not met)  5. Pt will have improved gcode of CJ (20-40% limited) on FOTO shoulder in order to demonstrate true functional improvement. (Progressing, not met)    Plan     Continue with POC to restore PROM, progressed MO Freitas, PTA, STS

## 2024-02-15 ENCOUNTER — CLINICAL SUPPORT (OUTPATIENT)
Dept: REHABILITATION | Facility: HOSPITAL | Age: 54
End: 2024-02-15
Payer: OTHER MISCELLANEOUS

## 2024-02-15 DIAGNOSIS — M25.511 ACUTE PAIN OF RIGHT SHOULDER: Primary | ICD-10-CM

## 2024-02-15 DIAGNOSIS — M25.619 LIMITED RANGE OF MOTION (ROM) OF SHOULDER: ICD-10-CM

## 2024-02-15 PROCEDURE — 97112 NEUROMUSCULAR REEDUCATION: CPT | Mod: CQ

## 2024-02-15 PROCEDURE — 97530 THERAPEUTIC ACTIVITIES: CPT | Mod: CQ

## 2024-02-15 NOTE — PROGRESS NOTES
Physical Therapy Daily Treatment Note     Name: Holly HEREDIA [facility] Number: [id number]  Therapy Diagnosis:        Encounter Diagnoses   Name Primary?    S/P right rotator cuff repair      Limited range of motion (ROM) of shoulder          Physician: Yessi Hahn MD     Physician Orders: PT Eval and Treat   Medical Diagnosis from Referral: M75.101 (ICD-10-CM) - Nontraumatic rotator cuff tear, right  Evaluation Date: 10/19/2023  Authorization Period Expiration: 7/9/2024  Plan of Care Expiration: 2/19/2024  Progress Note Due: 4/19/2024  Visit # / Visits authorized: 6/12  FOTO: 1/1     Precautions: Standard      Time In: 1000  Time Out: 1100  Total Appointment Time (timed & untimed codes): 60 minutes     POSTOPERATIVE PLAN: We will follow the arthroscopic rotator cuff repair guidelines for a small size rotator cuff tear.  We discussed with the patient's family after surgery.  The patient will remain in a sling for 6 weeks.  PT to start at 1-2 weeks.     Cuff specific program:  Pendulum exercises and Codman's exercises in 5-7 days, protecting rotator cuff repair for 1 week by avoiding active motion program until 1 week.     PASSIVE ROM: ER side 30 degrees, Forward Flex 90 degrees, ABD - 60 degrees   Full AAROM/PROM starting at 5-7 days as tolerated         Quality of tissue: fair      Quality of the repair: good       Size of tear: 10 x 15 mm, 60% partial thickness bursal     Subjective     Pt reports it the same pain, no more parades  She was compliant with home exercise program.  Response to previous treatment: soreness   Functional change: improved functional mobility, but still painful 90' flex/abd and pain at night     Pain: 4/10 with movement   Location: right shoulder      Objective     Holly received therapeutic exercises to develop strength, endurance, ROM, flexibility, posture, and core stabilization for 0 minutes including:      Holly received the following manual therapy techniques: Joint mobilizations,  "Manual traction, Myofacial release, Manual Lymphatic Drainage, Soft tissue Mobilization, and Friction Massage were applied to the: R shld  for 0  minutes, including:  Scapular mobs, oscillation,    Posterior capsular stretch, MET shld horizontal abd        Holly participated in neuromuscular re-education activities to improve: Balance, Coordination, Kinesthetic, Sense, Proprioception, and Posture for 30 minutes. The following activities were included:  Incline plinth shld taps 3x30  Sidelying ER 30 1#  Sidelying shld abd 2x15 1#   Sidelying fleixon 2 x 15 1#    Not today   CC shld flexion 30x eccentric control  CC shld scap 30x eccentric control   Slot machines 2# 3x10    OH OTB pulsing shld flexion 5x/30"       Holly participated in dynamic functional therapeutic activities to improve functional performance for 25  minutes, including:  UBE 5'/5'   Pulley shld flex/scaption 5'/5'  Wall slides 3x10/3"  Standing wall c/cc [place] simulating turning wheel 3x30 ea   Rows GTB with ER 3 x 10  Row GTB 30x   IR/ER OTB conc/ecc 3x10         Home Exercises Provided and Patient Education Provided     Education provided:   - Compliance with HEP     Written Home Exercises Provided: yes.  Exercises were reviewed and Holly was able to demonstrate them prior to the end of the session.  Holly demonstrated good  understanding of the education provided.       Assessment   Pt tolerating tx fair. Min increased discomfort with shld flex and abd but able to tolerated increased repetitions.   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 Continued with cuff strengthening as tolerated today. RICE after tx for pain management.     Holly Is progressing well towards her goals.   Pt prognosis is Good.     Pt will continue to benefit from skilled outpatient physical therapy to address the deficits listed in the problem list box on initial evaluation, provide pt/family education and to " maximize pt's level of independence in the home and community environment.     Pt's spiritual, cultural and educational needs considered and pt agreeable to plan of care and goals.    Anticipated barriers to physical therapy: none     Goals: GOALS: Short Term Goals:  6 weeks  1.Report decreased shoulder pain < / =  2/10  to increase tolerance for return to ADLs out the sling(Progressing, not met)  2. Increase PROM 120 flexion and 25 ER to restore a more functional ROM (Progressing, not met)  3. Increased strength by 1/3 MMT grade in R shoulder scap stabilizers to increase tolerance for ADL and work activities.(Progressing, not met)  4. 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 Pt will have improved gcode of CJ (20-40% limited) on FOTO shoulder in order to demonstrate true functional improvement. (Progressing, not met)    Plan     Continue with POC to restore PROM, progressed MO Freitas, PTA, STS

## 2024-02-21 ENCOUNTER — CLINICAL SUPPORT (OUTPATIENT)
Dept: REHABILITATION | Facility: HOSPITAL | Age: 54
End: 2024-02-21
Payer: OTHER MISCELLANEOUS

## 2024-02-21 DIAGNOSIS — M25.619 LIMITED RANGE OF MOTION (ROM) OF SHOULDER: ICD-10-CM

## 2024-02-21 DIAGNOSIS — M25.511 ACUTE PAIN OF RIGHT SHOULDER: Primary | ICD-10-CM

## 2024-02-21 PROCEDURE — 97112 NEUROMUSCULAR REEDUCATION: CPT | Mod: CQ

## 2024-02-21 PROCEDURE — 97530 THERAPEUTIC ACTIVITIES: CPT | Mod: CQ

## 2024-02-21 NOTE — PROGRESS NOTES
Physical Therapy Daily Treatment Note     Name: Holly Julian  [facility] Number: [id number]  Therapy Diagnosis:        Encounter Diagnoses   Name Primary?    S/P right rotator cuff repair      Limited range of motion (ROM) of shoulder          Physician: Yessi Hahn MD     Physician Orders: PT Eval and Treat   Medical Diagnosis from Referral: M75.101 (ICD-10-CM) - Nontraumatic rotator cuff tear, right  Evaluation Date: 10/19/2023  Authorization Period Expiration: 7/9/2024  Plan of Care Expiration: 5/19/2024  Progress Note Due: 4/19/2024  Visit # / Visits authorized: 11/12  FOTO: 1/1     Precautions: Standard      Time In: 1000  Time Out: 1100  Total Appointment Time (timed & untimed codes): 60 minutes     POSTOPERATIVE PLAN: We will follow the arthroscopic rotator cuff repair guidelines for a small size rotator cuff tear.  We discussed with the patient's family after surgery.  The patient will remain in a sling for 6 weeks.  PT to start at 1-2 weeks.     Cuff specific program:  Pendulum exercises and Codman's exercises in 5-7 days, protecting rotator cuff repair for 1 week by avoiding active motion program until 1 week.     PASSIVE ROM: ER side 30 degrees, Forward Flex 90 degrees, ABD - 60 degrees   Full AAROM/PROM starting at 5-7 days as tolerated         Quality of tissue: fair      Quality of the repair: good       Size of tear: 10 x 15 mm, 60% partial thickness bursal     Subjective     Pt reports I swatted at a mosquito in my house and I still have pain deep in that joint.   She was compliant with home exercise program.  Response to previous treatment: soreness   Functional change: improved functional mobility, but still painful 90' flex/abd and pain at night     Pain: 4/10 and increased with movement   Location: right shoulder      Objective   Pt reassessed by Supervising PT today prior to tx     2/19/24: Patient presents with active motion to 145 degrees and ER to 40 deg arm by side. Active flexion poor  "scapular mechanics, early upward rotation with weakness to LT/SA and overactive UT causing hiking. Reach behind her back to back pocket, unable to reach bra strap. Able to reach top of head.     Limited endurance in the community using the UE, still notes weakness with reaching cross body so concerned with turning wheel to drive.    ER 4-/5   IR 4/5  LT 3/5  MT 4-/5  Flexion 4/5  Abd 4-/5      Holly received therapeutic exercises to develop strength, endurance, ROM, flexibility, posture, and core stabilization for 0 minutes including:      Holly received the following manual therapy techniques: Joint mobilizations, Manual traction, Myofacial release, Manual Lymphatic Drainage, Soft tissue Mobilization, and Friction Massage were applied to the: R shld  for 10  minutes, including:  Scapular mobs, oscillation,    Posterior capsular stretch, MET shld horizontal abd        Holly participated in neuromuscular re-education activities to improve: Balance, Coordination, Kinesthetic, Sense, Proprioception, and Posture for 25 minutes. The following activities were included:  Supine YTB shld flexion -band on foot 3x10/3"  Sidelying ER 30 1#  Sidelying shld abd 2x15 1#   Sidelying fleixon 2 x 15 1#    Not today   CC shld flexion 30x eccentric control  CC shld scap 30x eccentric control   Slot machines 2# 3x10  OH OTB pulsing shld flexion 5x/30"       Holly participated in dynamic functional therapeutic activities to improve functional performance for 25  minutes, including:  UBE 5'/5'   Pulley shld flex/scaption 5'/5'  Wall slides 3x10/3"  Standing wall c/cc [place] simulating turning wheel 3x30 ea   Rows GTB with ER 3 x 10  Row GTB 30x   IR/ER OTB conc/ecc 3x10         Home Exercises Provided and Patient Education Provided     Education provided:   - Compliance with HEP     Written Home Exercises Provided: yes.  Exercises were reviewed and Holly was able to demonstrate them prior to the end of the session.  Holly " demonstrated good  understanding of the education provided.       Assessment   Pt tolerating tx well. Improved participation today with decreased pain noted performing strengthening and ROM activity.  Tolerating OH ROM with pulleys in flexion and scaption without increased pain. Soreness and discomfort note with SL ER.  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 .  Holly Is progressing well towards her goals.   Pt prognosis is Good.     Pt will continue to benefit from skilled outpatient physical therapy to address the deficits listed in the problem list box on initial evaluation, provide pt/family education and to maximize pt's level of independence in the home and community environment.     Pt's spiritual, cultural and educational needs considered and pt agreeable to plan of care and goals.    Anticipated barriers to physical therapy: none     Goals: GOALS: Short Term Goals:  6 weeks (Updated 2/19/2024)  1.Report decreased shoulder pain < / =  2/10  to increase tolerance for return to ADLs out the sling(met)  2. Increase PROM 120 flexion and 25 ER to restore a more functional ROM (met)  3. Increased strength by 1/3 MMT grade in R shoulder scap stabilizers to increase tolerance for ADL and work activities.(met)  4. 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 Pt will have improved gcode of CJ (20-40% limited) on FOTO shoulder in order to demonstrate true  functional improvement. (Progressing, not met)    Plan     Certification Period: 2/19/2024 to 5/19/2024  Recommended Treatment Plan: 2 times per week for 12 weeks: Manual Therapy, Moist Heat/ Ice, Neuromuscular Re-ed, Patient Education, Self Care, Therapeutic Activities, and Therapeutic Exercise  Other Recommendations: modalities prn, ASTYM prn, Dry Needling prn    Therapist: Nasir De León, PT, DPT    I CERTIFY THE NEED FOR THESE SERVICES FURNISHED UNDER THIS PLAN OF TREATMENT AND WHILE UNDER MY CARE    Physician's comments: ________________________________________________________________________________________________________________________________________________      Physician's Name: ___________________________________     Judd BEAR [place], PTA, STS

## 2024-02-23 ENCOUNTER — CLINICAL SUPPORT (OUTPATIENT)
Dept: REHABILITATION | Facility: HOSPITAL | Age: 54
End: 2024-02-23
Payer: OTHER MISCELLANEOUS

## 2024-02-23 DIAGNOSIS — M25.619 LIMITED RANGE OF MOTION (ROM) OF SHOULDER: ICD-10-CM

## 2024-02-23 DIAGNOSIS — M25.511 ACUTE PAIN OF RIGHT SHOULDER: Primary | ICD-10-CM

## 2024-02-23 PROCEDURE — 97112 NEUROMUSCULAR REEDUCATION: CPT | Mod: CQ

## 2024-02-23 PROCEDURE — 97530 THERAPEUTIC ACTIVITIES: CPT | Mod: CQ

## 2024-02-23 NOTE — PROGRESS NOTES
Physical Therapy Daily Treatment Note     Name: Holly Julian  [facility] Number: [id number]  Therapy Diagnosis:        Encounter Diagnoses   Name Primary?    S/P right rotator cuff repair      Limited range of motion (ROM) of shoulder          Physician: Yessi Hahn MD     Physician Orders: PT Eval and Treat   Medical Diagnosis from Referral: M75.101 (ICD-10-CM) - Nontraumatic rotator cuff tear, right  Evaluation Date: 10/19/2023  Authorization Period Expiration: 7/9/2024  Plan of Care Expiration: 5/19/2024  Progress Note Due: 4/19/2024  Visit # / Visits authorized: 11/12  FOTO: 1/1     Precautions: Standard      Time In: 0800  Time Out: 0900  Total Appointment Time (timed & untimed codes): 60 minutes     POSTOPERATIVE PLAN: We will follow the arthroscopic rotator cuff repair guidelines for a small size rotator cuff tear.  We discussed with the patient's family after surgery.  The patient will remain in a sling for 6 weeks.  PT to start at 1-2 weeks.     Cuff specific program:  Pendulum exercises and Codman's exercises in 5-7 days, protecting rotator cuff repair for 1 week by avoiding active motion program until 1 week.     PASSIVE ROM: ER side 30 degrees, Forward Flex 90 degrees, ABD - 60 degrees   Full AAROM/PROM starting at 5-7 days as tolerated         Quality of tissue: fair      Quality of the repair: good       Size of tear: 10 x 15 mm, 60% partial thickness bursal     Subjective     Pt reports I swatted at a mosquito in my house and I still have pain deep in that joint.   She was compliant with home exercise program.  Response to previous treatment: soreness   Functional change: improved functional mobility, but still painful 90' flex/abd and pain at night     Pain: 4/10 and increased with movement   Location: right shoulder      Objective   Pt reassessed by Supervising PT today prior to tx     2/19/24: Patient presents with active motion to 145 degrees and ER to 40 deg arm by side. Active flexion poor  "scapular mechanics, early upward rotation with weakness to LT/SA and overactive UT causing hiking. Reach behind her back to back pocket, unable to reach bra strap. Able to reach top of head.     Limited endurance in the community using the UE, still notes weakness with reaching cross body so concerned with turning wheel to drive.    ER 4-/5   IR 4/5  LT 3/5  MT 4-/5  Flexion 4/5  Abd 4-/5      Holly received therapeutic exercises to develop strength, endurance, ROM, flexibility, posture, and core stabilization for 0 minutes including:      Holly received the following manual therapy techniques: Joint mobilizations, Manual traction, Myofacial release, Manual Lymphatic Drainage, Soft tissue Mobilization, and Friction Massage were applied to the: R shld  for 10 minutes, including:  Scapular mobs, oscillation, R UT stretch, KT R UT  Posterior capsular stretch, MET shld horizontal abd        Holly participated in neuromuscular re-education activities to improve: Balance, Coordination, Kinesthetic, Sense, Proprioception, and Posture for 25 minutes. The following activities were included:  Supine YTB shld flexion -band on foot 2x20/3"  Sidelying ER 30 1#  Sidelying shld abd 2x15 1#   Sidelying fleixon 2 x 15 1#    Not today   CC shld flexion 30x eccentric control  CC shld scap 30x eccentric control   Slot machines 2# 3x10  OH OTB pulsing shld flexion 5x/30"       Holly participated in dynamic functional therapeutic activities to improve functional performance for 25  minutes, including:  UBE 5'/5'   P[place]ley shld flex/scaption 5'/5'  Wall slides 3x10/3"    Standing wall c/cc [place] simulating turning wheel 3x30 ea   Rows GTB with ER 3 x 10  Row GTB 30x   IR/ER OTB conc/ecc 3x10         Home Exercises Provided and Patient Education Provided     Education provided:   - Compliance with HEP     Written Home Exercises Provided: yes.  Exercises were reviewed and Holly was able to demonstrate them prior to the end of the " session.  Holly demonstrated good  understanding of the education provided.       Assessment   Pt tolerating tx well. .  Tolerating OH ROM with pulleys and wall slides in flexion and scaption without increased pain. Soreness and discomfort note with SL ER.  Good result with KT. 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 .  Holly Is progressing well towards her goals.   Pt prognosis is Good.     Pt will continue to benefit from skilled outpatient physical therapy to address the deficits listed in the problem list box on initial evaluation, provide pt/family education and to maximize pt's level of independence in the home and community environment.     Pt's spiritual, cultural and educational needs considered and pt agreeable to plan of care and goals.    Anticipated barriers to physical therapy: none     Goals: GOALS: Short Term Goals:  6 weeks (Updated 2/19/2024)  1.Report decreased shoulder pain < / =  2/10  to increase tolerance for return to ADLs out the sling(met)  2. Increase PROM 120 flexion and 25 ER to restore a more functional ROM (met)  3. Increased strength by 1/3 MMT grade in R shoulder scap stabilizers to increase tolerance for ADL and work activities.(met)  4. 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 Pt will have improved gcode of CJ (20-40% limited) on FOTO shoulder in order to demonstrate true functional improvement. (Progressing, not  met)    Plan     Certification Period: 2/19/2024 to 5/19/2024  Recommended Treatment Plan: 2 times per week for 12 weeks: Manual Therapy, Moist Heat/ Ice, Neuromuscular Re-ed, Patient Education, Self Care, Therapeutic Activities, and Therapeutic Exercise  Other Recommendations: modalities prn, ASTYM prn, Dry Needling prn    Therapist: Judd Freitas PTA, DPT    I CERTIFY THE NEED FOR THESE SERVICES FURNISHED UNDER THIS PLAN OF TREATMENT AND WHILE UNDER MY CARE    Physician's comments: ________________________________________________________________________________________________________________________________________________      Physician's Name: ___________________________________     Judd Freitas PTA, STS

## 2024-02-23 NOTE — PLAN OF CARE
Physical Therapy Daily Treatment Note     Name: Holly Julian  [facility] Number: [id number]  Therapy Diagnosis:        Encounter Diagnoses   Name Primary?    S/P right rotator cuff repair      Limited range of motion (ROM) of shoulder          Physician: Yessi Hahn MD     Physician Orders: PT Eval and Treat   Medical Diagnosis from Referral: M75.101 (ICD-10-CM) - Nontraumatic rotator cuff tear, right  Evaluation Date: 10/19/2023  Authorization Period Expiration: 7/9/2024  Plan of Care Expiration: 5/19/2024  Progress Note Due: 4/19/2024  Visit # / Visits authorized: 11/12  FOTO: 1/1     Precautions: Standard      Time In: 1000  Time Out: 1100  Total Appointment Time (timed & untimed codes): 60 minutes     POSTOPERATIVE PLAN: We will follow the arthroscopic rotator cuff repair guidelines for a small size rotator cuff tear.  We discussed with the patient's family after surgery.  The patient will remain in a sling for 6 weeks.  PT to start at 1-2 weeks.     Cuff specific program:  Pendulum exercises and Codman's exercises in 5-7 days, protecting rotator cuff repair for 1 week by avoiding active motion program until 1 week.     PASSIVE ROM: ER side 30 degrees, Forward Flex 90 degrees, ABD - 60 degrees   Full AAROM/PROM starting at 5-7 days as tolerated         Quality of tissue: fair      Quality of the repair: good       Size of tear: 10 x 15 mm, 60% partial thickness bursal     Subjective     Pt reports I swatted at a mosquito in my house and I still have pain deep in that joint.   She was compliant with home exercise program.  Response to previous treatment: soreness   Functional change: improved functional mobility, but still painful 90' flex/abd and pain at night     Pain: 4/10 and increased with movement   Location: right shoulder      Objective   Pt reassessed by Supervising PT today prior to tx     2/19/24: Patient presents with active motion to 145 degrees and ER to 40 deg arm by side. Active flexion poor  "scapular mechanics, early upward rotation with weakness to LT/SA and overactive UT causing hiking. Reach behind her back to back pocket, unable to reach bra strap. Able to reach top of head.     Limited endurance in the community using the UE, still notes weakness with reaching cross body so concerned with turning wheel to drive.    ER 4-/5   IR 4/5  LT 3/5  MT 4-/5  Flexion 4/5  Abd 4-/5      Holly received therapeutic exercises to develop strength, endurance, ROM, flexibility, posture, and core stabilization for 0 minutes including:      Holly received the following manual therapy techniques: Joint mobilizations, Manual traction, Myofacial release, Manual Lymphatic Drainage, Soft tissue Mobilization, and Friction Massage were applied to the: R shld  for 10  minutes, including:  Scapular mobs, oscillation,    Posterior capsular stretch, MET shld horizontal abd        Holly participated in neuromuscular re-education activities to improve: Balance, Coordination, Kinesthetic, Sense, Proprioception, and Posture for 25 minutes. The following activities were included:  Supine YTB shld flexion -band on foot 3x10/3"  Sidelying ER 30 1#  Sidelying shld abd 2x15 1#   Sidelying fleixon 2 x 15 1#    Not today   CC shld flexion 30x eccentric control  CC shld scap 30x eccentric control   Slot machines 2# 3x10  OH OTB pulsing shld flexion 5x/30"       Holly participated in dynamic functional therapeutic activities to improve functional performance for 25  minutes, including:  UBE 5'/5'   Pulley shld flex/scaption 5'/5'  Wall slides 3x10/3"  Standing wall c/cc [employer] simulating turning wheel 3x30 ea   Rows GTB with ER 3 x 10  Row GTB 30x   IR/ER OTB conc/ecc 3x10         Home Exercises Provided and Patient Education Provided     Education provided:   - Compliance with HEP     Written Home Exercises Provided: yes.  Exercises were reviewed and Holly was able to demonstrate them prior to the end of the session.  Holly " demonstrated good  understanding of the education provided.       Assessment   Pt tolerating tx well. Improved participation today with decreased pain noted performing strengthening and ROM activity.  Tolerating OH ROM with pulleys in flexion and scaption without increased pain. Soreness and discomfort note with SL ER.  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 .  Holly Is progressing well towards her goals.   Pt prognosis is Good.     Pt will continue to benefit from skilled outpatient physical therapy to address the deficits listed in the problem list box on initial evaluation, provide pt/family education and to maximize pt's level of independence in the home and community environment.     Pt's spiritual, cultural and educational needs considered and pt agreeable to plan of care and goals.    Anticipated barriers to physical therapy: none     Goals: GOALS: Short Term Goals:  6 weeks (Updated 2/19/2024)  1.Report decreased shoulder pain < / =  2/10  to increase tolerance for return to ADLs out the sling(met)  2. Increase PROM 120 flexion and 25 ER to restore a more functional ROM (met)  3. Increased strength by 1/3 MMT grade in R shoulder scap stabilizers to increase tolerance for ADL and work activities.(met)  4. 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 Pt will have improved gcode of CJ (20-40% limited) on FOTO shoulder in order to demonstrate true  functional improvement. (Progressing, not met)    Plan     Certification Period: 2/19/2024 to 5/19/2024  Recommended Treatment Plan: 2 times per week for 12 weeks: Manual Therapy, Moist Heat/ Ice, Neuromuscular Re-ed, Patient Education, Self Care, Therapeutic Activities, and Therapeutic Exercise  Other Recommendations: modalities prn, ASTYM prn, Dry Needling prn    Therapist: Nasir De León, PT, DPT    I CERTIFY THE NEED FOR THESE SERVICES FURNISHED UNDER THIS PLAN OF TREATMENT AND WHILE UNDER MY CARE    Physician's comments: ________________________________________________________________________________________________________________________________________________      Physician's Name: ___________________________________     Judd BEAR [place]bells, PTA, STS

## 2024-03-07 ENCOUNTER — TELEPHONE (OUTPATIENT)
Dept: SPORTS MEDICINE | Facility: CLINIC | Age: 54
End: 2024-03-07
Payer: COMMERCIAL

## 2024-03-07 NOTE — TELEPHONE ENCOUNTER
----- Message from Janel Morales sent at 3/7/2024  9:53 AM CST -----  Pt calling in regards to PT being stopped due to needing to see the Dr , next appt is 4-3 , please call       Confirmed patient's contact info below:  Contact Name: Holly Julian  Phone Number: [phone]

## 2024-03-13 ENCOUNTER — OFFICE VISIT (OUTPATIENT)
Dept: SPORTS MEDICINE | Facility: CLINIC | Age: 54
End: 2024-03-13
Payer: COMMERCIAL

## 2024-03-13 VITALS
BODY MASS INDEX: 41.78 KG/M2 | DIASTOLIC BLOOD PRESSURE: 81 MMHG | HEART RATE: 102 BPM | SYSTOLIC BLOOD PRESSURE: 137 MMHG | WEIGHT: 207.25 LBS | HEIGHT: 59 IN

## 2024-03-13 DIAGNOSIS — G89.29 CHRONIC RIGHT SHOULDER PAIN: Primary | ICD-10-CM

## 2024-03-13 DIAGNOSIS — Z98.890 S/P RIGHT ROTATOR CUFF REPAIR: ICD-10-CM

## 2024-03-13 DIAGNOSIS — M25.511 CHRONIC RIGHT SHOULDER PAIN: Primary | ICD-10-CM

## 2024-03-13 PROCEDURE — 99214 OFFICE O/P EST MOD 30 MIN: CPT | Mod: S$PBB,,, | Performed by: ORTHOPAEDIC SURGERY

## 2024-03-13 PROCEDURE — 99999 PR PBB SHADOW E&M-EST. PATIENT-LVL III: CPT | Mod: PBBFAC,,, | Performed by: ORTHOPAEDIC SURGERY

## 2024-03-13 NOTE — PROGRESS NOTES
Chief Complaint: right shoulder follow up    HISTORY OF PRESENT ILLNESS:   Pt is here today for follow up of shoulder arthroscopy.  she is doing well.  We have reviewed patient's findings and discussed plan of care and future treatment options.      Patient has been attending physical therapy at the [facility] location, working with Nasir and Judd.     Work Comp    SANE 75/100  VAS 4/10      DATE OF PROCEDURE: 10/03/2023  right  1. Shoulder arthroscopic rotator cuff repair (CPT 14099) with [employer] ()  2. Shoulder arthroscopic biceps tenodesis (CPT 00432)  3. Shoulder arthroscopic subacromial decompression, bursectomy   4. Shoulder arthroscopic extensive debridement (anterior, posterior glenohumeral joint, subacromial space) (CPT 86791)  5. Shoulder arthroscopic labral debridement (CPT 43396)  6. Shoulder arthroscopic lysis of adhesions (CPT 49686)  7. Shoulder arthroscopic distal clavicle excision (CPT 17703)     Size of tear: 10 x 15 mm, 60% partial thickness bursal          Review of Systems   Constitution: Negative. Negative for chills, fever and night sweats.   HENT: Negative for congestion and headaches.    Eyes: Negative for blurred vision, left vision loss and right vision loss.   Cardiovascular: Negative for chest pain and syncope.   Respiratory: Negative for cough and shortness of breath.    Endocrine: Negative for polydipsia, polyphagia and polyuria.   Hematologic/Lymphatic: Negative for bleeding problem. Does not bruise/bleed easily.   Skin: Negative for dry skin, itching and rash.   Musculoskeletal: Negative for falls and muscle weakness.   Gastrointestinal: Negative for abdominal pain and bowel incontinence.   Genitourinary: Negative for bladder incontinence and nocturia.   Neurological: Negative for disturbances in coordination, loss of balance and seizures.   Psychiatric/Behavioral: Negative for depression. The patient does not have insomnia.    Allergic/Immunologic: Negative for hives and  persistent infections.       PAST MEDICAL HISTORY:   Past Medical History:   Diagnosis Date    Essential (primary) hypertension      PAST SURGICAL HISTORY:   Past Surgical History:   Procedure Laterality Date    ARTHROSCOPIC DEBRIDEMENT OF SHOULDER Right 10/3/2023    Procedure: EXTENSIVE DEBRIDEMENT, SHOULDER, ARTHROSCOPIC;  Surgeon: Yessi Hahn MD;  Location: [facility] OR;  Service: Orthopedics;  Laterality: Right;    ARTHROSCOPIC REPAIR OF ROTATOR CUFF OF SHOULDER Right 10/3/2023    Procedure: REPAIR, ROTATOR CUFF, ARTHROSCOPIC WITH CUFF MEND;  Surgeon: Yessi Hahn MD;  Location: [facility] OR;  Service: Orthopedics;  Laterality: Right;    ARTHROSCOPY OF SHOULDER WITH DECOMPRESSION OF SUBACROMIAL SPACE Right 10/3/2023    Procedure: ARTHROSCOPY, SHOULDER, WITH SUBACROMIAL  DECOMPRESSION/ BURSECTOMY;  Surgeon: Yessi Hhan MD;  Location: [facility] OR;  Service: Orthopedics;  Laterality: Right;    ARTHROSCOPY,SHOULDER,WITH BICEPS TENODESIS Right 10/3/2023    Procedure: ARTHROSCOPY,SHOULDER,WITH BICEPS TENODESIS;  Surgeon: Yessi Hahn MD;  Location: [facility] OR;  Service: Orthopedics;  Laterality: Right;     SECTION  2001    DISTAL CLAVICLE EXCISION Right 10/3/2023    Procedure: EXCISION, CLAVICLE, DISTAL;  Surgeon: Yessi Hahn MD;  Location: [facility] OR;  Service: Orthopedics;  Laterality: Right;    LYSIS, ADHESIONS, SHOULDER, ARTHROSCOPIC Right 10/3/2023    Procedure: LYSIS, ADHESIONS, SHOULDER, ARTHROSCOPIC;  Surgeon: Yessi Hahn MD;  Location: [facility] OR;  Service: Orthopedics;  Laterality: Right;    SHOULDER ARTHROSCOPY Right 10/3/2023    Procedure: ARTHROSCOPY, SHOULDER WITH L;ABRAL DEBRIDEMENT;  Surgeon: Yessi Hahn MD;  Location: [facility] OR;  Service: Orthopedics;  Laterality: Right;     FAMILY HISTORY: History reviewed. No pertinent family history.  SOCIAL HISTORY:   Social History     Socioeconomic History    Marital status: Single   Tobacco Use    Smoking status: Never    Smokeless tobacco: Never   Substance and  "Sexual Activity    Alcohol use: Not Currently    Drug use: Never       MEDICATIONS:   Current Outpatient Medications:     amLODIPine (NORVASC) 5 MG tablet, Take 1 tablet (5 mg total) by mouth once daily., Disp: 90 tablet, Rfl: 3    naproxen (NAPROSYN) 500 MG tablet, Take 1 tablet (500 mg total) by mouth 2 (two) times daily as needed (Pain). Take with meal, Disp: 60 tablet, Rfl: 3  ALLERGIES: Review of patient's allergies indicates:  No Known Allergies    VITAL SIGNS: /81   Pulse 102   Ht 4' 11" (1.499 m)   Wt 94 kg (207 lb 3.7 oz)   BMI 41.86 kg/m²                                                                               PHYSICAL EXAMINATION:     Incision sites healed well  No evidence of any erythema, infection or induration  elbow Range of motion full   No effusion  2+ Radial pulses  No swelling         ROM:      Forward Elevation: 90 (supine active assisted forward elevation: 120)  ER: 50  IR: buttock    Strength  Scaption at 0 and 30: 4/5  ER: 5/5                                                                          ASSESSMENT:                                                                                                                                               1. Status post above, doing well.                                                                                                                               PLAN:                                                                                                                                                     1. Continue PT, case discussed with therapist.   2.  Follow up 2 months  3. OK to return work with desk duties as tolerated, working 4 hours/day and progress to 6 hours, then 8 hours as tolerated                                                                                                          "

## 2024-04-03 ENCOUNTER — CLINICAL SUPPORT (OUTPATIENT)
Dept: REHABILITATION | Facility: HOSPITAL | Age: 54
End: 2024-04-03
Payer: OTHER MISCELLANEOUS

## 2024-04-03 DIAGNOSIS — M25.511 ACUTE PAIN OF RIGHT SHOULDER: Primary | ICD-10-CM

## 2024-04-03 DIAGNOSIS — M25.619 LIMITED RANGE OF MOTION (ROM) OF SHOULDER: ICD-10-CM

## 2024-04-03 DIAGNOSIS — G89.29 CHRONIC RIGHT SHOULDER PAIN: ICD-10-CM

## 2024-04-03 DIAGNOSIS — M25.511 CHRONIC RIGHT SHOULDER PAIN: ICD-10-CM

## 2024-04-03 DIAGNOSIS — Z98.890 S/P RIGHT ROTATOR CUFF REPAIR: ICD-10-CM

## 2024-04-03 PROCEDURE — 97530 THERAPEUTIC ACTIVITIES: CPT | Performed by: PHYSICAL THERAPIST

## 2024-04-03 PROCEDURE — 97112 NEUROMUSCULAR REEDUCATION: CPT | Performed by: PHYSICAL THERAPIST

## 2024-04-03 PROCEDURE — 97140 MANUAL THERAPY 1/> REGIONS: CPT | Performed by: PHYSICAL THERAPIST

## 2024-04-03 NOTE — PROGRESS NOTES
Physical Therapy Daily Treatment Note     Name: Holly Julian  [facility] Number: [id number]  Therapy Diagnosis:        Encounter Diagnoses   Name Primary?    S/P right rotator cuff repair      Limited range of motion (ROM) of shoulder          Physician: Yessi Hahn MD     Physician Orders: PT Eval and Treat   Medical Diagnosis from Referral: M75.101 (ICD-10-CM) - Nontraumatic rotator cuff tear, right  Evaluation Date: 10/19/2023  Authorization Period Expiration: 7/9/2024  Plan of Care Expiration: 5/19/2024  Progress Note Due: 4/19/2024  Visit # / Visits authorized: 1/12  Total visits 28  FOTO: 1/1     Precautions: Standard      Time In: 0800  Time Out: 0900  Total Appointment Time (timed & untimed codes): 60 minutes     POSTOPERATIVE PLAN: We will follow the arthroscopic rotator cuff repair guidelines for a small size rotator cuff tear.  We discussed with the patient's family after surgery.  The patient will remain in a sling for 6 weeks.  PT to start at 1-2 weeks.     Cuff specific program:  Pendulum exercises and Codman's exercises in 5-7 days, protecting rotator cuff repair for 1 week by avoiding active motion program until 1 week.     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  Response to previous treatment: soreness   Functional change: improved functional mobility, but still painful 90' flex/abd and pain at night     Pain: 4/10 and increased with movement   Location: right shoulder      Objective   Pt reassessed by Supervising PT today prior to tx     2/19/24: Patient presents with active motion to 145 degrees and ER to 40 deg arm by side.  "Active flexion poor scapular mechanics, early upward rotation with weakness to LT/SA and overactive UT causing hiking. Reach behind her back to back pocket, unable to reach bra strap. Able to reach top of head.     Limited endurance in the community using the UE, still notes weakness with reaching cross body so concerned with turning wheel to drive.    ER 4-/5   IR 4/5  LT 3/5  MT 4-/5  Flexion 4/5  Abd 4-/5      Holly received therapeutic exercises to develop strength, endurance, ROM, flexibility, posture, and core stabilization for 0 minutes including:      Holly received the following manual therapy techniques: Joint mobilizations, Manual traction, Myofacial release, Manual Lymphatic Drainage, Soft tissue Mobilization, and Friction Massage were applied to the: R shld  for 10 minutes, including:  Scapular mobs, oscillation, R UT stretch, KT R UT  Posterior capsular stretch, MET shld horizontal abd        Holly participated in neuromuscular re-education activities to improve: Balance, Coordination, Kinesthetic, Sense, Proprioception, and Posture for 36 minutes. The following activities were included:  Supine YTB shld flexion -band on foot 2x20/3"  Seated flexion wand 30x  Supine T YTB 2 x 15  Sidelying ER 30 1#  Sidelying shld abd 2x15 0#   Sidelying fleixon 2 x 15 0#    Not today   CC shld flexion 30x eccentric control  CC shld scap 30x eccentric control   Slot machines 2# 3x10  OH OTB pulsing shld flexion 5x/30"       Holly participated in dynamic functional therapeutic activities to improve functional performance for 14  minutes, including:  UBE 4'/4' level 4 for scap endurance and motion   Scaptions lift off propped table 3 x 12    NT  Pulley shld flex/scaption 5'/5'  Wall slides 3x10/3"  Standing wall c/cc [place] simulating turning wheel 3x30 ea   Rows GTB with ER 3 x 10  Row GTB 30x   IR/ER OTB conc/ecc 3x10         Home Exercises Provided and Patient Education Provided     Education provided:   - " Compliance with HEP     Written Home Exercises Provided: yes.  Exercises were reviewed and Holly was able to demonstrate them prior to the end of the session.  Holly demonstrated good  understanding of the education provided.       Assessment     Holly returned for first treatment in quite some time due to visit coverage. Frustrated within session on time away from the clinic. Motion was decreased today, noted more pain passively. Poor motor control and form with exercises today      Holly Is progressing well towards her goals.   Pt prognosis is Good.     Pt will continue to benefit from skilled outpatient physical therapy to address the deficits listed in the problem list box on initial evaluation, provide pt/family education and to maximize pt's level of independence in the home and community environment.     Pt's spiritual, cultural and educational needs considered and pt agreeable to plan of care and goals.    Anticipated barriers to physical therapy: none     Goals: GOALS: Short Term Goals:  6 weeks (Updated 2/19/2024)  1.Report decreased shoulder pain < / =  2/10  to increase tolerance for return to ADLs out the sling(met)  2. Increase PROM 120 flexion and 25 ER to restore a more functional ROM (met)  3. Increased strength by 1/3 MMT grade in R shoulder scap stabilizers to increase tolerance for ADL and work activities.(met)  4. 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 Pt will have improved gcode of CJ (20-40% limited) on FOTO shoulder in  order to demonstrate true functional improvement. (Progressing, not met)    Plan     Certification Period: 2/19/2024 to 5/19/2024    Progress cuff strength    Therapist: Nasir De León, PT, DPT

## 2024-04-05 ENCOUNTER — CLINICAL SUPPORT (OUTPATIENT)
Dept: REHABILITATION | Facility: HOSPITAL | Age: 54
End: 2024-04-05
Payer: OTHER MISCELLANEOUS

## 2024-04-05 DIAGNOSIS — M25.511 ACUTE PAIN OF RIGHT SHOULDER: Primary | ICD-10-CM

## 2024-04-05 DIAGNOSIS — M25.619 LIMITED RANGE OF MOTION (ROM) OF SHOULDER: ICD-10-CM

## 2024-04-05 PROCEDURE — 97140 MANUAL THERAPY 1/> REGIONS: CPT | Mod: CQ

## 2024-04-05 PROCEDURE — 97530 THERAPEUTIC ACTIVITIES: CPT | Mod: CQ

## 2024-04-05 PROCEDURE — 97112 NEUROMUSCULAR REEDUCATION: CPT | Mod: CQ

## 2024-04-05 NOTE — PROGRESS NOTES
Physical Therapy Daily Treatment Note     Name: Holly Julian  [facility] Number: [id number]  Therapy Diagnosis:        Encounter Diagnoses   Name Primary?    S/P right rotator cuff repair      Limited range of motion (ROM) of shoulder          Physician: Yessi Hahn MD     Physician Orders: PT Eval and Treat   Medical Diagnosis from Referral: M75.101 (ICD-10-CM) - Nontraumatic rotator cuff tear, right  Evaluation Date: 10/19/2023  Authorization Period Expiration: 7/9/2024  Plan of Care Expiration: 5/19/2024  Progress Note Due: 4/19/2024  Visit # / Visits authorized: 1/12  Total visits 28  FOTO: 1/1     Precautions: Standard      Time In: 0700  Time Out: 0800  Total Appointment Time (timed & untimed codes): 60 minutes     POSTOPERATIVE PLAN: We will follow the arthroscopic rotator cuff repair guidelines for a small size rotator cuff tear.  We discussed with the patient's family after surgery.  The patient will remain in a sling for 6 weeks.  PT to start at 1-2 weeks.     Cuff specific program:  Pendulum exercises and Codman's exercises in 5-7 days, protecting rotator cuff repair for 1 week by avoiding active motion program until 1 week.     PASSIVE ROM: ER side 30 degrees, Forward Flex 90 degrees, ABD - 60 degrees   Full AAROM/PROM starting at 5-7 days as tolerated         Quality of tissue: fair      Quality of the repair: good       Size of tear: 10 x 15 mm, 60% partial thickness bursal     Subjective     Pt reports neck and shld pain, I missed therapy for to long because of my insurance.   She was compliant with home exercise program. What I could do  Response to previous treatment: soreness   Functional change: improved functional mobility, but still painful 90' flex/abd and pain at night     Pain: 6/10 and increased with movement   Location: right shoulder      Objective     Limited endurance in the community using the UE, still notes weakness with reaching cross body so concerned with turning wheel to  "drive.    ER 4-/5   IR 4/5  LT 3/5  MT 4-/5  Flexion 4/5  Abd 4-/5      Holly received therapeutic exercises to develop strength, endurance, ROM, flexibility, posture, and core stabilization for 0 minutes including:      Holly received the following manual therapy techniques: Joint mobilizations, Manual traction, Myofacial release, Manual Lymphatic Drainage, Soft tissue Mobilization, and Friction Massage were applied to the: R shld  for 15 minutes, including:  Scapular mobs, oscillation, R UT stretch,   Posterior capsular stretch, MET shld horizontal abd        Holly participated in neuromuscular re-education activities to improve: Balance, Coordination, Kinesthetic, Sense, Proprioception, and Posture for 35 minutes. The following activities were included:  Supine YTB shld flexion -band on foot 2x20/3"  Supine YTB HABD   Seated flexion wand 30x  Supine T YTB 2 x 15  Prone R shld ext 3x10  Prone R rows 1# 3x10  Sidelying ER 30 1#  Sidelying shld abd 2x15 0#   Sidelying fleixon 2 x 15 0#    Not today   CC shld flexion 30x eccentric control  CC shld scap 30x eccentric control   Slot machines 2# 3x10  OH OTB pulsing shld flexion 5x/30"       Holly participated in dynamic functional therapeutic activities to improve functional performance for 10  minutes, including:  UBE 5'/5' level 4 for scap endurance and motion   Scaptions lift off propped table 3 x 12 np    NT  Bre shld flex/scaption 5'/5'  Wall slides 3x10/3"  Standing wall c/cc [place] simulating turning wheel 3x30 ea   Rows GTB with ER 3 x 10  Row GTB 30x   IR/ER OTB conc/ecc 3x10         Home Exercises Provided and Patient Education Provided     Education provided:   - Compliance with HEP     Written Home Exercises Provided: yes.  Exercises were reviewed and Holly was able to demonstrate them prior to the end of the session.  Holly demonstrated good  understanding of the education provided.       Assessment   Pt tolerating tx fair. Increased pain with " decreased ROM noted today. Pt progress with prior tx was delayed due to problems with insurance coverage and time away from clinic. VC/TC for correcting form/technique and for encouragement to get back on track. Continue to progress as tolerated.     Holly Is progressing well towards her goals.   Pt prognosis is Good.     Pt will continue to benefit from skilled outpatient physical therapy to address the deficits listed in the problem list box on initial evaluation, provide pt/family education and to maximize pt's level of independence in the home and community environment.     Pt's spiritual, cultural and educational needs considered and pt agreeable to plan of care and goals.    Anticipated barriers to physical therapy: none     Goals: GOALS: Short Term Goals:  6 weeks (Updated 2/19/2024)  1.Report decreased shoulder pain < / =  2/10  to increase tolerance for return to ADLs out the sling(met)  2. Increase PROM 120 flexion and 25 ER to restore a more functional ROM (met)  3. Increased strength by 1/3 MMT grade in R shoulder scap stabilizers to increase tolerance for ADL and work activities.(met)  4. 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 Pt will have improved gcode of CJ (20-40% limited) on FOTO shoulder in order to demonstrate true functional improvement. (Progressing, not met)    Plan     Certification Period: 2/19/2024 to 5/19/2024    Progress cuff strength    Therapist: Judd Freitas, PTA, STS

## 2024-04-08 ENCOUNTER — CLINICAL SUPPORT (OUTPATIENT)
Dept: REHABILITATION | Facility: HOSPITAL | Age: 54
End: 2024-04-08
Payer: OTHER MISCELLANEOUS

## 2024-04-08 DIAGNOSIS — M25.619 LIMITED RANGE OF MOTION (ROM) OF SHOULDER: ICD-10-CM

## 2024-04-08 DIAGNOSIS — M25.511 ACUTE PAIN OF RIGHT SHOULDER: Primary | ICD-10-CM

## 2024-04-08 PROCEDURE — 97112 NEUROMUSCULAR REEDUCATION: CPT | Mod: CQ

## 2024-04-08 PROCEDURE — 97530 THERAPEUTIC ACTIVITIES: CPT | Mod: CQ

## 2024-04-08 NOTE — PROGRESS NOTES
Physical Therapy Daily Treatment Note     Name: Holly Julian  [facility] Number: [id number]  Therapy Diagnosis:        Encounter Diagnoses   Name Primary?    S/P right rotator cuff repair      Limited range of motion (ROM) of shoulder          Physician: Yessi Hahn MD     Physician Orders: PT Eval and Treat   Medical Diagnosis from Referral: M75.101 (ICD-10-CM) - Nontraumatic rotator cuff tear, right  Evaluation Date: 10/19/2023  Authorization Period Expiration: 7/9/2024  Plan of Care Expiration: 5/19/2024  Progress Note Due: 4/19/2024  Visit # / Visits authorized: 1/12  Total visits 28  FOTO: 1/1     Precautions: Standard      Time In: 0800  Time Out: 0900  Total Appointment Time (timed & untimed codes): 60 minutes     POSTOPERATIVE PLAN: We will follow the arthroscopic rotator cuff repair guidelines for a small size rotator cuff tear.  We discussed with the patient's family after surgery.  The patient will remain in a sling for 6 weeks.  PT to start at 1-2 weeks.     Cuff specific program:  Pendulum exercises and Codman's exercises in 5-7 days, protecting rotator cuff repair for 1 week by avoiding active motion program until 1 week.     PASSIVE ROM: ER side 30 degrees, Forward Flex 90 degrees, ABD - 60 degrees   Full AAROM/PROM starting at 5-7 days as tolerated         Quality of tissue: fair      Quality of the repair: good       Size of tear: 10 x 15 mm, 60% partial thickness bursal     Subjective     Pt reports neck and shld pain, my neck is pinching, this is a set back   She was compliant with home exercise program. What I could do  Response to previous treatment: soreness/mixed feelings   Functional change: improved functional mobility, but still painful 90' flex/abd and pain at night     Pain: 6/10 and increased with movement   Location: right shoulder      Objective     Limited endurance in the community using the UE, still notes weakness with reaching cross body so concerned with turning wheel to  "drive.    ER 4-/5   IR 4/5  LT 3/5  MT 4-/5  Flexion 4/5  Abd 4-/5      Holly received therapeutic exercises to develop strength, endurance, ROM, flexibility, posture, and core stabilization for 0 minutes including:      Holly received the following manual therapy techniques: Joint mobilizations, Manual traction, Myofacial release, Manual Lymphatic Drainage, Soft tissue Mobilization, and Friction Massage were applied to the: R shld  for  minutes, including:  Scapular mobs, oscillation, R UT stretch,   Posterior capsular stretch, MET shld horizontal abd        Holly participated in neuromuscular re-education activities to improve: Balance, Coordination, Kinesthetic, Sense, Proprioception, and Posture for 40 minutes. The following activities were included:  Thoracic ext in chair 30x  Open books R UE 30x   Supine YTB shld flexion -band on foot 2x20/3"  Supine YTB HABD   Seated flexion wand 30x  Supine T YTB 2 x 15  Prone R shld ext 3x10  Prone R rows 1# 3x10  Sidelying ER 30 1#  Sidelying shld abd 2x15 0#   Sidelying fleixon 2 x 15 0#    Not today   CC shld flexion 30x eccentric control  CC shld scap 30x eccentric control   Slot machines 2# 3x10  OH OTB pulsing shld flexion 5x/30"       Holly participated in dynamic functional therapeutic activities to improve functional performance for 15  minutes, including:  UBE 5'/5' level 4 for scap endurance and motion   Bre shld flex and scaption 5' ea   Scaptions lift off propped table 3 x 12 np    NT  Bre shld flex/scaption 5'/5'  Wall slides 3x10/3"  Standing wall c/cc [place] simulating turning wheel 3x30 ea   Rows GTB with ER 3 x 10  Row GTB 30x   IR/ER OTB conc/ecc 3x10         Home Exercises Provided and Patient Education Provided     Education provided:   - Compliance with HEP     Written Home Exercises Provided: yes.  Exercises were reviewed and Holly was able to demonstrate them prior to the end of the session.  Holly demonstrated good  understanding of the " education provided.       Assessment   Pt tolerating tx fair. Min improved tolerance today with ROM and strengthening.  VC/TC for correcting form/technique and for encouragement to get back on track. Continue to progress as tolerated.     Holly Is progressing well towards her goals.   Pt prognosis is Good.     Pt will continue to benefit from skilled outpatient physical therapy to address the deficits listed in the problem list box on initial evaluation, provide pt/family education and to maximize pt's level of independence in the home and community environment.     Pt's spiritual, cultural and educational needs considered and pt agreeable to plan of care and goals.    Anticipated barriers to physical therapy: none     Goals: GOALS: Short Term Goals:  6 weeks (Updated 2/19/2024)  1.Report decreased shoulder pain < / =  2/10  to increase tolerance for return to ADLs out the sling(met)  2. Increase PROM 120 flexion and 25 ER to restore a more functional ROM (met)  3. Increased strength by 1/3 MMT grade in R shoulder scap stabilizers to increase tolerance for ADL and work activities.(met)  4. 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 Pt will have improved gcode of CJ (20-40% limited) on FOTO shoulder in order to demonstrate true functional improvement. (Progressing, not met)    Plan     Certification Period: 2/19/2024 to 5/19/2024    Progress cuff strength    Therapist: Judd Freitas, PTA, STS

## 2024-04-09 ENCOUNTER — TELEPHONE (OUTPATIENT)
Dept: SPORTS MEDICINE | Facility: CLINIC | Age: 54
End: 2024-04-09
Payer: COMMERCIAL

## 2024-04-10 ENCOUNTER — OFFICE VISIT (OUTPATIENT)
Dept: SPORTS MEDICINE | Facility: CLINIC | Age: 54
End: 2024-04-10
Payer: COMMERCIAL

## 2024-04-10 VITALS
HEIGHT: 59 IN | DIASTOLIC BLOOD PRESSURE: 88 MMHG | SYSTOLIC BLOOD PRESSURE: 147 MMHG | WEIGHT: 212.19 LBS | HEART RATE: 100 BPM | BODY MASS INDEX: 42.78 KG/M2

## 2024-04-10 DIAGNOSIS — M25.511 RIGHT SHOULDER PAIN, UNSPECIFIED CHRONICITY: Primary | ICD-10-CM

## 2024-04-10 DIAGNOSIS — M75.21 BICEPS TENDONITIS ON RIGHT: ICD-10-CM

## 2024-04-10 DIAGNOSIS — Z98.890 S/P RIGHT ROTATOR CUFF REPAIR: ICD-10-CM

## 2024-04-10 DIAGNOSIS — S43.431D SUPERIOR GLENOID LABRUM LESION OF RIGHT SHOULDER, SUBSEQUENT ENCOUNTER: ICD-10-CM

## 2024-04-10 DIAGNOSIS — M54.2 CERVICALGIA: ICD-10-CM

## 2024-04-10 PROCEDURE — 99999 PR PBB SHADOW E&M-EST. PATIENT-LVL III: CPT | Mod: PBBFAC,,, | Performed by: ORTHOPAEDIC SURGERY

## 2024-04-10 PROCEDURE — 99214 OFFICE O/P EST MOD 30 MIN: CPT | Mod: S$PBB,,, | Performed by: ORTHOPAEDIC SURGERY

## 2024-04-10 RX ORDER — MELOXICAM 15 MG/1
15 TABLET ORAL DAILY
Qty: 30 TABLET | Refills: 3 | Status: SHIPPED | OUTPATIENT
Start: 2024-04-10

## 2024-04-10 RX ORDER — METHYLPREDNISOLONE 4 MG/1
TABLET ORAL
Qty: 21 EACH | Refills: 0 | Status: SHIPPED | OUTPATIENT
Start: 2024-04-10 | End: 2024-05-01

## 2024-04-10 NOTE — LETTER
Patient: Holly Salcido Wilkinson   YOB: 1970   Clinic Number: [id number]   Today's Date: April 10, 2024        Certificate to Return to Work     Holly Salcido was seen by Yessi Hahn MD on 4/10/2024.    Holly will be out of work for the nest 2 months.     Holly's anticipated return to work date is 06/11/2024     Specific restrictions: Holly will be out of work until 06/11/2024. She has a follow-up scheduled with Dr. Hahn on 06/10/2024. At this appointment she will be reevaluated and her anticipated return to work date will be updated if needed.      If you have any questions or concerns, please feel free to contact the office at [phone].    Thank you.    Yessi Hahn MD        Signature:  __________________________________________________

## 2024-04-10 NOTE — PROGRESS NOTES
Chief Complaint: right shoulder follow up    HISTORY OF PRESENT ILLNESS:   Pt is here today for 6 month follow up of shoulder arthroscopy.   We have reviewed patient's findings and discussed plan of care and future treatment options.      Patient has been attending physical therapy at the [facility] location, working with Nasir and Judd. She states she has developed a radiating pain beginning in her neck/trap area, radiating into her mid forearm     Work Comp    SANE 70/100  VAS 5/10      DATE OF PROCEDURE: 10/03/2023  right  1. Shoulder arthroscopic rotator cuff repair (CPT 14017) with [employer] ()  2. Shoulder arthroscopic biceps tenodesis (CPT 17646)  3. Shoulder arthroscopic subacromial decompression, bursectomy   4. Shoulder arthroscopic extensive debridement (anterior, posterior glenohumeral joint, subacromial space) (CPT 08144)  5. Shoulder arthroscopic labral debridement (CPT 68891)  6. Shoulder arthroscopic lysis of adhesions (CPT 23194)  7. Shoulder arthroscopic distal clavicle excision (CPT 20565)     Size of tear: 10 x 15 mm, 60% partial thickness bursal          Review of Systems   Constitution: Negative. Negative for chills, fever and night sweats.   HENT: Negative for congestion and headaches.    Eyes: Negative for blurred vision, left vision loss and right vision loss.   Cardiovascular: Negative for chest pain and syncope.   Respiratory: Negative for cough and shortness of breath.    Endocrine: Negative for polydipsia, polyphagia and polyuria.   Hematologic/Lymphatic: Negative for bleeding problem. Does not bruise/bleed easily.   Skin: Negative for dry skin, itching and rash.   Musculoskeletal: Negative for falls and muscle weakness.   Gastrointestinal: Negative for abdominal pain and bowel incontinence.   Genitourinary: Negative for bladder incontinence and nocturia.   Neurological: Negative for disturbances in coordination, loss of balance and seizures.   Psychiatric/Behavioral: Negative  for depression. The patient does not have insomnia.    Allergic/Immunologic: Negative for hives and persistent infections.       PAST MEDICAL HISTORY:   Past Medical History:   Diagnosis Date    Essential (primary) hypertension      PAST SURGICAL HISTORY:   Past Surgical History:   Procedure Laterality Date    ARTHROSCOPIC DEBRIDEMENT OF SHOULDER Right 10/3/2023    Procedure: EXTENSIVE DEBRIDEMENT, SHOULDER, ARTHROSCOPIC;  Surgeon: Yessi Hahn MD;  Location: [facility] OR;  Service: Orthopedics;  Laterality: Right;    ARTHROSCOPIC REPAIR OF ROTATOR CUFF OF SHOULDER Right 10/3/2023    Procedure: REPAIR, ROTATOR CUFF, ARTHROSCOPIC WITH CUFF MEND;  Surgeon: Yessi Hahn MD;  Location: [facility] OR;  Service: Orthopedics;  Laterality: Right;    ARTHROSCOPY OF SHOULDER WITH DECOMPRESSION OF SUBACROMIAL SPACE Right 10/3/2023    Procedure: ARTHROSCOPY, SHOULDER, WITH SUBACROMIAL  DECOMPRESSION/ BURSECTOMY;  Surgeon: Yessi Hahn MD;  Location: [facility] OR;  Service: Orthopedics;  Laterality: Right;    ARTHROSCOPY,SHOULDER,WITH BICEPS TENODESIS Right 10/3/2023    Procedure: ARTHROSCOPY,SHOULDER,WITH BICEPS TENODESIS;  Surgeon: Yessi Hahn MD;  Location: [facility] OR;  Service: Orthopedics;  Laterality: Right;     SECTION  2001    DISTAL CLAVICLE EXCISION Right 10/3/2023    Procedure: EXCISION, CLAVICLE, DISTAL;  Surgeon: Yessi Hahn MD;  Location: [facility] OR;  Service: Orthopedics;  Laterality: Right;    LYSIS, ADHESIONS, SHOULDER, ARTHROSCOPIC Right 10/3/2023    Procedure: LYSIS, ADHESIONS, SHOULDER, ARTHROSCOPIC;  Surgeon: Yessi Hahn MD;  Location: [facility] OR;  Service: Orthopedics;  Laterality: Right;    SHOULDER ARTHROSCOPY Right 10/3/2023    Procedure: ARTHROSCOPY, SHOULDER WITH L;ABRAL DEBRIDEMENT;  Surgeon: Yessi Hahn MD;  Location: [facility] OR;  Service: Orthopedics;  Laterality: Right;     FAMILY HISTORY: History reviewed. No pertinent family history.  SOCIAL HISTORY:   Social History     Socioeconomic History    Marital  "status: Single   Tobacco Use    Smoking status: Never    Smokeless tobacco: Never   Substance and Sexual Activity    Alcohol use: Not Currently    Drug use: Never       MEDICATIONS:   Current Outpatient Medications:     amLODIPine (NORVASC) 5 MG tablet, Take 1 tablet (5 mg total) by mouth once daily., Disp: 90 tablet, Rfl: 3    naproxen (NAPROSYN) 500 MG tablet, Take 1 tablet (500 mg total) by mouth 2 (two) times daily as needed (Pain). Take with meal, Disp: 60 tablet, Rfl: 3  ALLERGIES: Review of patient's allergies indicates:  No Known Allergies    VITAL SIGNS: BP (!) 147/88   Pulse 100   Ht 4' 11" (1.499 m)   Wt 96.2 kg (212 lb 3.1 oz)   BMI 42.86 kg/m²                                                                               PHYSICAL EXAMINATION:     Incision sites healed well  No evidence of any erythema, infection or induration  elbow Range of motion full   No effusion  2+ Radial pulses  No swelling         ROM:      Forward Elevation: 120   ER: 50  IR: buttock    Strength  Scaption at 0 and 30: 4+/5  ER: 5/5                                                                          ASSESSMENT:                                                                                                                                               1. Status post above, doing well.                                                                                                                               PLAN:                                                                                                                                                     1. Continue PT, case discussed with therapist.   2.  Follow up 2 months  3. Hold out of work for 2 months to work on PT   4. Spine referral placed    4. Mobic 15mg daily PRN, medrol dose pack prescribed today                                                                                                          " no organomegaly/soft/nondistended/no rebound, no guarding/tender.../no pulsating masses

## 2024-04-11 NOTE — PROGRESS NOTES
DATE: 4/16/2024  PATIENT: Holly Julian    Supervising Physician: Barrett Aleman M.D.    CHIEF COMPLAINT: neck and right shoulder pain    HISTORY:  Holly Julian is a 54 y.o. female sp right rotator cuff repair (Dr. Hahn 10/3/23) here for initial evaluation of neck and right arm pain (Neck - 7, Arm - 7). The pain has been present since her rotator cuff surgery. The patient describes the pain as radiating from the right side of her neck to her right shoulder/upper arm and sometimes down the entire right arm. The pain is worse with right arm movement and improved by nothing. There is mild associated numbness and tingling. There is positive subjective weakness. Prior treatments have included current PT for 8 weeks in the last 3 months, but no ESIs or surgery.     The patient denies myelopathic symptoms such as handwriting changes or difficulty with buttons/coins/keys. Denies perineal paresthesias, bowel/bladder dysfunction.    PAST MEDICAL/SURGICAL HISTORY:  Past Medical History:   Diagnosis Date    Essential (primary) hypertension      Past Surgical History:   Procedure Laterality Date    ARTHROSCOPIC DEBRIDEMENT OF SHOULDER Right 10/3/2023    Procedure: EXTENSIVE DEBRIDEMENT, SHOULDER, ARTHROSCOPIC;  Surgeon: Yessi Hahn MD;  Location: [facility] OR;  Service: Orthopedics;  Laterality: Right;    ARTHROSCOPIC REPAIR OF ROTATOR CUFF OF SHOULDER Right 10/3/2023    Procedure: REPAIR, ROTATOR CUFF, ARTHROSCOPIC WITH CUFF MEND;  Surgeon: Yessi Hahn MD;  Location: [facility] OR;  Service: Orthopedics;  Laterality: Right;    ARTHROSCOPY OF SHOULDER WITH DECOMPRESSION OF SUBACROMIAL SPACE Right 10/3/2023    Procedure: ARTHROSCOPY, SHOULDER, WITH SUBACROMIAL  DECOMPRESSION/ BURSECTOMY;  Surgeon: Yessi Hahn MD;  Location: [facility] OR;  Service: Orthopedics;  Laterality: Right;    ARTHROSCOPY,SHOULDER,WITH BICEPS TENODESIS Right 10/3/2023    Procedure: ARTHROSCOPY,SHOULDER,WITH BICEPS TENODESIS;  Surgeon: Yessi Hahn MD;  Location: [facility]  OR;  Service: Orthopedics;  Laterality: Right;     SECTION  2001    DISTAL CLAVICLE EXCISION Right 10/3/2023    Procedure: EXCISION, CLAVICLE, DISTAL;  Surgeon: Yessi Hahn MD;  Location: [facility] OR;  Service: Orthopedics;  Laterality: Right;    LYSIS, ADHESIONS, SHOULDER, ARTHROSCOPIC Right 10/3/2023    Procedure: LYSIS, ADHESIONS, SHOULDER, ARTHROSCOPIC;  Surgeon: Yessi Hahn MD;  Location: [facility] OR;  Service: Orthopedics;  Laterality: Right;    SHOULDER ARTHROSCOPY Right 10/3/2023    Procedure: ARTHROSCOPY, SHOULDER WITH L;ABRAL DEBRIDEMENT;  Surgeon: Yessi Hahn MD;  Location: [facility] OR;  Service: Orthopedics;  Laterality: Right;       Medications:  Current Outpatient Medications on File Prior to Visit   Medication Sig Dispense Refill    amLODIPine (NORVASC) 5 MG tablet Take 1 tablet (5 mg total) by mouth once daily. 90 tablet 3    meloxicam (MOBIC) 15 MG tablet Take 1 tablet (15 mg total) by mouth once daily. 30 tablet 3    methylPREDNISolone (MEDROL DOSEPACK) 4 mg tablet use as directed 21 each 0    naproxen (NAPROSYN) 500 MG tablet Take 1 tablet (500 mg total) by mouth 2 (two) times daily as needed (Pain). Take with meal 60 tablet 3     No current facility-administered medications on file prior to visit.       Social History:   Social History     Socioeconomic History    Marital status: Single   Tobacco Use    Smoking status: Never    Smokeless tobacco: Never   Substance and Sexual Activity    Alcohol use: Not Currently    Drug use: Never       REVIEW OF SYSTEMS:  Constitution: Negative. Negative for chills, fever and night sweats.   Cardiovascular: Negative for chest pain and syncope.   Respiratory: Negative for cough and shortness of breath.   Gastrointestinal: See HPI. Negative for nausea/vomiting. Negative for abdominal pain.  Genitourinary: See HPI. Negative for discoloration or dysuria.  Skin: Negative for dry skin, itching and rash.   Hematologic/Lymphatic: Negative for bleeding problem.  Does not bruise/bleed easily.   Musculoskeletal: Negative for falls and muscle weakness.   Neurological: See HPI. No seizures.   Endocrine: Negative for polydipsia, polyphagia and polyuria.   Allergic/Immunologic: Negative for hives and persistent infections.  Psychiatric/Behavioral: Negative for depression and insomnia.         EXAM:  There were no vitals taken for this visit.    General: The patient is a very pleasant 54 y.o. female in no apparent distress, the patient is oriented to person, place and time.  Psych: Normal mood and affect  HEENT: Vision grossly intact, hearing intact to the spoken word.  Lungs: Respirations unlabored.  Gait: Normal station and gait, no difficulty with toe or heel walk.   Skin: Cervical skin negative for rashes, lesions, hairy patches and surgical scars.  Range of motion: Cervical range of motion is acceptable. There is mild tenderness to palpation.  Spinal Balance: Global saggital and coronal spinal balance acceptable, no significant for scoliosis and kyphosis.  Musculoskeletal: No pain with the range of motion of the bilateral shoulders and elbows. Normal bulk and contour of the bilateral hands.  Vascular: Bilateral hands warm and well perfused, radial pulses 2+ bilaterally.  Neurological: Normal strength and tone in all major motor groups in the bilateral upper and lower extremities. Normal sensation to light touch in the C5-T1 and L2-S1 dermatomes bilaterally.  Deep tendon reflexes symmetric 2+ in the bilateral upper and lower extremities.  Negative Inverted Radial Reflex and Henderson's bilaterally. Negative Babinski bilaterally.     IMAGING:   Today I personally reviewed MRI shoulder right (2023) demonstrates small partial-thickness rotator cuff tear with tendinosis, involving the infraspinatus tendon     There is no height or weight on file to calculate BMI.    Hemoglobin A1C   Date Value Ref Range Status   09/26/2023 6.0 (H) 4.0 - 5.6 % Final     Comment:     ADA Screening  Guidelines:  5.7-6.4%  Consistent with prediabetes  >or=6.5%  Consistent with diabetes    High levels of fetal hemoglobin interfere with the HbA1C  assay. Heterozygous hemoglobin variants (HbS, HgC, etc)do  not significantly interfere with this assay.   However, presence of multiple variants may affect accuracy.             ASSESSMENT/PLAN:    There are no diagnoses linked to this encounter.    Today we discussed at length all of the different treatment options including anti-inflammatories, acetaminophen, rest, ice, heat, physical therapy including strengthening and stretching exercises, home exercises, ROM, aerobic conditioning, aqua therapy, other modalities including ultrasound, massage, and dry needling, epidural steroid injections and finally surgical intervention.      Pt presents with chronic cervical radiculopathy. Failure of conservative rx. Will obtain MRI to further evaluate and send gabapentin to pharmacy

## 2024-04-15 ENCOUNTER — CLINICAL SUPPORT (OUTPATIENT)
Dept: REHABILITATION | Facility: HOSPITAL | Age: 54
End: 2024-04-15
Payer: OTHER MISCELLANEOUS

## 2024-04-15 DIAGNOSIS — M50.30 DDD (DEGENERATIVE DISC DISEASE), CERVICAL: Primary | ICD-10-CM

## 2024-04-15 DIAGNOSIS — M25.619 LIMITED RANGE OF MOTION (ROM) OF SHOULDER: ICD-10-CM

## 2024-04-15 DIAGNOSIS — M25.511 ACUTE PAIN OF RIGHT SHOULDER: Primary | ICD-10-CM

## 2024-04-15 PROCEDURE — 97112 NEUROMUSCULAR REEDUCATION: CPT | Mod: CQ

## 2024-04-15 PROCEDURE — 97530 THERAPEUTIC ACTIVITIES: CPT | Mod: CQ

## 2024-04-15 NOTE — PROGRESS NOTES
Physical Therapy Daily Treatment Note     Name: Holly Julian  [facility] Number: [id number]  Therapy Diagnosis:        Encounter Diagnoses   Name Primary?    S/P right rotator cuff repair      Limited range of motion (ROM) of shoulder          Physician: Yessi Hahn MD     Physician Orders: PT Eval and Treat   Medical Diagnosis from Referral: M75.101 (ICD-10-CM) - Nontraumatic rotator cuff tear, right  Evaluation Date: 10/19/2023  Authorization Period Expiration: 7/9/2024  Plan of Care Expiration: 5/19/2024  Progress Note Due: 4/19/2024  Visit # / Visits authorized: 1/12  Total visits 28  FOTO: 1/1     Precautions: Standard      Time In: 0800  Time Out: 0900  Total Appointment Time (timed & untimed codes): 60 minutes     POSTOPERATIVE PLAN: We will follow the arthroscopic rotator cuff repair guidelines for a small size rotator cuff tear.  We discussed with the patient's family after surgery.  The patient will remain in a sling for 6 weeks.  PT to start at 1-2 weeks.     Cuff specific program:  Pendulum exercises and Codman's exercises in 5-7 days, protecting rotator cuff repair for 1 week by avoiding active motion program until 1 week.     PASSIVE ROM: ER side 30 degrees, Forward Flex 90 degrees, ABD - 60 degrees   Full AAROM/PROM starting at 5-7 days as tolerated         Quality of tissue: fair      Quality of the repair: good       Size of tear: 10 x 15 mm, 60% partial thickness bursal     Subjective     Pt reports mod pain with shld today   She was compliant with home exercise program. Did a lot   Response to previous treatment: soreness/mixed feelings   Functional change: improved functional mobility, but still painful 90' flex/abd and pain at night     Pain: 4/10 and increased with movement   Location: right shoulder      Objective     Limited endurance in the community using the UE, still notes weakness with reaching cross body so concerned with turning wheel to drive.    ER 4-/5   IR 4/5  LT 3/5  MT  "4-/5  Flexion 4/5  Abd 4-/5      Holly received therapeutic exercises to develop strength, endurance, ROM, flexibility, posture, and core stabilization for 10 minutes including:      Holly received the following manual therapy techniques: Joint mobilizations, Manual traction, Myofacial release, Manual Lymphatic Drainage, Soft tissue Mobilization, and Friction Massage were applied to the: R shld  for  minutes, including:  Scapular mobs, oscillation, R UT stretch,   Posterior capsular stretch, MET shld horizontal abd        Holly participated in neuromuscular re-education activities to improve: Balance, Coordination, Kinesthetic, Sense, Proprioception, and Posture for 40 minutes. The following activities were included:  Supine YTB shld flexion -band on foot 2x20/3"  Thoracic ext in chair 30x  Open books R UE 30x   Supine YTB HABD   Seated flexion wand 30x  R UE nerve glides   Supine T YTB 2 x 15  Prone R shld ext 3x10  Prone R rows 1# 3x10  Sidelying ER 30 1#  Sidelying shld abd 2x15 0#   Sidelying fleixon 2 x 15 0#    Not today   CC shld flexion 30x eccentric control  CC shld scap 30x eccentric control   Slot machines 2# 3x10  OH OTB pulsing shld flexion 5x/30"       Holly participated in dynamic functional therapeutic activities to improve functional performance for 15  minutes, including:  Pulley shld flex and scaption 5' ea   UBE 5'/5' level 4 for scap endurance and motion   Scaptions lift off propped table 3 x 12 np    NT  Wall slides 3x10/3"  Standing wall c/cc [place] simulating turning wheel 3x30 ea   Rows GTB with ER 3 x 10  Row GTB 30x   IR/ER OTB conc/ecc 3x10       Home Exercises Provided and Patient Education Provided     Education provided:   - Compliance with HEP     Written Home Exercises Provided: yes.  Exercises were reviewed and Holly was able to demonstrate them prior to the end of the session.  Holly demonstrated good  understanding of the education provided.       Assessment   Pt tolerating " tx better today. Continue with ROM and strengthening.  VC/TC for correcting form/technique and for encouragement to get back on track. Continue to progress as tolerated.     Holly Is progressing well towards her goals.   Pt prognosis is Good.     Pt will continue to benefit from skilled outpatient physical therapy to address the deficits listed in the problem list box on initial evaluation, provide pt/family education and to maximize pt's level of independence in the home and community environment.     Pt's spiritual, cultural and educational needs considered and pt agreeable to plan of care and goals.    Anticipated barriers to physical therapy: none     Goals: GOALS: Short Term Goals:  6 weeks (Updated 2/19/2024)  1.Report decreased shoulder pain < / =  2/10  to increase tolerance for return to ADLs out the sling(met)  2. Increase PROM 120 flexion and 25 ER to restore a more functional ROM (met)  3. Increased strength by 1/3 MMT grade in R shoulder scap stabilizers to increase tolerance for ADL and work activities.(met)  4. 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 Pt will have improved gcode of CJ (20-40% limited) on FOTO shoulder in order to demonstrate true functional improvement. (Progressing, not met)    Plan     Certification Period: 2/19/2024 to 5/19/2024    Progress cuff strength    Therapist: Judd Freitas, PTA, STS

## 2024-04-16 ENCOUNTER — OFFICE VISIT (OUTPATIENT)
Dept: ORTHOPEDICS | Facility: CLINIC | Age: 54
End: 2024-04-16
Payer: OTHER MISCELLANEOUS

## 2024-04-16 VITALS — WEIGHT: 212.38 LBS | HEIGHT: 59 IN | BODY MASS INDEX: 42.81 KG/M2

## 2024-04-16 DIAGNOSIS — M54.2 CERVICALGIA: ICD-10-CM

## 2024-04-16 DIAGNOSIS — M54.12 CERVICAL RADICULOPATHY: Primary | ICD-10-CM

## 2024-04-16 PROCEDURE — 99204 OFFICE O/P NEW MOD 45 MIN: CPT | Mod: S$GLB,,, | Performed by: ORTHOPAEDIC SURGERY

## 2024-04-16 PROCEDURE — 99999 PR PBB SHADOW E&M-EST. PATIENT-LVL III: CPT | Mod: PBBFAC,,, | Performed by: ORTHOPAEDIC SURGERY

## 2024-04-16 RX ORDER — GABAPENTIN 100 MG/1
100 CAPSULE ORAL 3 TIMES DAILY
Qty: 90 CAPSULE | Refills: 11 | Status: SHIPPED | OUTPATIENT
Start: 2024-04-16 | End: 2025-04-16

## 2024-04-22 ENCOUNTER — CLINICAL SUPPORT (OUTPATIENT)
Dept: REHABILITATION | Facility: HOSPITAL | Age: 54
End: 2024-04-22
Payer: OTHER MISCELLANEOUS

## 2024-04-22 DIAGNOSIS — M25.619 LIMITED RANGE OF MOTION (ROM) OF SHOULDER: ICD-10-CM

## 2024-04-22 DIAGNOSIS — M25.511 ACUTE PAIN OF RIGHT SHOULDER: Primary | ICD-10-CM

## 2024-04-22 PROCEDURE — 97530 THERAPEUTIC ACTIVITIES: CPT | Mod: CQ

## 2024-04-22 PROCEDURE — 97112 NEUROMUSCULAR REEDUCATION: CPT | Mod: CQ

## 2024-04-22 NOTE — PROGRESS NOTES
Physical Therapy Daily Treatment Note     Name: Holly Julian  [facility] Number: [id number]  Therapy Diagnosis:        Encounter Diagnoses   Name Primary?    S/P right rotator cuff repair      Limited range of motion (ROM) of shoulder          Physician: Yessi Hahn MD     Physician Orders: PT Eval and Treat   Medical Diagnosis from Referral: M75.101 (ICD-10-CM) - Nontraumatic rotator cuff tear, right  Evaluation Date: 10/19/2023  Authorization Period Expiration: 7/9/2024  Plan of Care Expiration: 5/19/2024  Progress Note Due: 4/19/2024  Visit # / Visits authorized: 1/12  Total visits 28  FOTO: 1/1     Precautions: Standard      Time In: 0900  Time Out: 1000  Total Appointment Time (timed & untimed codes): 60 minutes     POSTOPERATIVE PLAN: We will follow the arthroscopic rotator cuff repair guidelines for a small size rotator cuff tear.  We discussed with the patient's family after surgery.  The patient will remain in a sling for 6 weeks.  PT to start at 1-2 weeks.     Cuff specific program:  Pendulum exercises and Codman's exercises in 5-7 days, protecting rotator cuff repair for 1 week by avoiding active motion program until 1 week.     PASSIVE ROM: ER side 30 degrees, Forward Flex 90 degrees, ABD - 60 degrees   Full AAROM/PROM starting at 5-7 days as tolerated         Quality of tissue: fair      Quality of the repair: good       Size of tear: 10 x 15 mm, 60% partial thickness bursal     Subjective     Pt reports min pain with shld today   She was compliant with home exercise program.   Response to previous treatment: soreness   Functional change: improved functional mobility, but still painful 90' flex/abd and pain at night     Pain: 4/10 and increased with movement   Location: right shoulder      Objective     Limited endurance in the community using the UE, still notes weakness with reaching cross body so concerned with turning wheel to drive.    ER 4-/5   IR 4/5  LT 3/5  MT 4-/5  Flexion 4/5  Abd  "4-/5      Holly received therapeutic exercises to develop strength, endurance, ROM, flexibility, posture, and core stabilization for 10 minutes including:      Holly received the following manual therapy techniques: Joint mobilizations, Manual traction, Myofacial release, Manual Lymphatic Drainage, Soft tissue Mobilization, and Friction Massage were applied to the: R shld  for  minutes, including:  Scapular mobs, oscillation, R UT stretch,   Posterior capsular stretch, MET shld horizontal abd        Holly participated in neuromuscular re-education activities to improve: Balance, Coordination, Kinesthetic, Sense, Proprioception, and Posture for 40 minutes. The following activities were included:  Supine YTB shld flexion -band on foot 2x20/3"  SL YTB shld abd - band on foot 2x20/3"  Open books R UE 30x   Supine YTB hand cuff shld flex     Thoracic ext in chair 30x  Supine YTB HABD   Seated flexion wand 30x  R UE nerve glides   Supine T YTB 2 x 15  Prone R shld ext 3x10  Prone R rows 1# 3x10  Sidelying ER 30 1#  Sidelying shld abd 2x15 0#   Sidelying fleixon 2 x 15 0#    Not today   CC shld flexion 30x eccentric control  CC shld scap 30x eccentric control   Slot machines 2# 3x10  OH OTB pulsing shld flexion 5x/30"       Holly participated in dynamic functional therapeutic activities to improve functional performance for 20  minutes, including:  Pulley shld flex and scaption 5' ea   UBE 5'/5' level 4 for scap endurance and motion   Scaptions lift off propped table 3 x 12 NP    NT  Wall slides 3x10/3"  Standing wall c/cc [place] simulating turning wheel 3x30 ea   Rows GTB with ER 3 x 10  Row GTB 30x   IR/ER OTB conc/ecc 3x10       Home Exercises Provided and Patient Education Provided     Education provided:   - Compliance with HEP     Written Home Exercises Provided: yes.  Exercises were reviewed and Holly was able to demonstrate them prior to the end of the session.  Holly demonstrated good  understanding of the " education provided.       Assessment   Pt continues to show improved tolerance in therapy. Continue with ROM and strengthening.  VC/TC for correcting form/technique and for encouragement to get back on track. Continue to progress as tolerated.     Holly Is progressing well towards her goals.   Pt prognosis is Good.     Pt will continue to benefit from skilled outpatient physical therapy to address the deficits listed in the problem list box on initial evaluation, provide pt/family education and to maximize pt's level of independence in the home and community environment.     Pt's spiritual, cultural and educational needs considered and pt agreeable to plan of care and goals.    Anticipated barriers to physical therapy: none     Goals: GOALS: Short Term Goals:  6 weeks (Updated 2/19/2024)  1.Report decreased shoulder pain < / =  2/10  to increase tolerance for return to ADLs out the sling(met)  2. Increase PROM 120 flexion and 25 ER to restore a more functional ROM (met)  3. Increased strength by 1/3 MMT grade in R shoulder scap stabilizers to increase tolerance for ADL and work activities.(met)  4. 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 Pt will have improved gcode of CJ (20-40% limited) on FOTO shoulder in order to demonstrate true functional improvement. (Progressing, not met)    Plan     Certification Period: 2/19/2024 to 5/19/2024    Progress cuff strength    Therapist: Judd Freitas, PTA, STS

## 2024-05-06 ENCOUNTER — CLINICAL SUPPORT (OUTPATIENT)
Dept: REHABILITATION | Facility: HOSPITAL | Age: 54
End: 2024-05-06
Payer: OTHER MISCELLANEOUS

## 2024-05-06 DIAGNOSIS — M25.511 ACUTE PAIN OF RIGHT SHOULDER: Primary | ICD-10-CM

## 2024-05-06 DIAGNOSIS — M25.619 LIMITED RANGE OF MOTION (ROM) OF SHOULDER: ICD-10-CM

## 2024-05-06 PROCEDURE — 97530 THERAPEUTIC ACTIVITIES: CPT | Performed by: PHYSICAL THERAPIST

## 2024-05-06 PROCEDURE — 97140 MANUAL THERAPY 1/> REGIONS: CPT | Performed by: PHYSICAL THERAPIST

## 2024-05-06 PROCEDURE — 97112 NEUROMUSCULAR REEDUCATION: CPT | Performed by: PHYSICAL THERAPIST

## 2024-05-06 RX ORDER — DIAZEPAM 2 MG/1
2 TABLET ORAL
Qty: 2 TABLET | Refills: 0 | Status: SHIPPED | OUTPATIENT
Start: 2024-05-06 | End: 2024-06-05

## 2024-05-06 NOTE — PLAN OF CARE
Physical Therapy Daily Treatment Note     Name: Holly Julian  [facility] Number: [id number]  Therapy Diagnosis:        Encounter Diagnoses   Name Primary?    S/P right rotator cuff repair      Limited range of motion (ROM) of shoulder          Physician: Yessi Hahn MD     Physician Orders: PT Eval and Treat   Medical Diagnosis from Referral: M75.101 (ICD-10-CM) - Nontraumatic rotator cuff tear, right  Evaluation Date: 10/19/2023  Authorization Period Expiration: 7/9/2024  New Plan of Care Expiration: 8/6/2024  Visit # / Visits authorized: 6/12  Total visits 28  FOTO: 1/1     Precautions: Standard      Time In: 0700  Time Out: 759  Total Appointment Time (timed & untimed codes): 59 minutes     POSTOPERATIVE PLAN: We will follow the arthroscopic rotator cuff repair guidelines for a small size rotator cuff tear.  We discussed with the patient's family after surgery.  The patient will remain in a sling for 6 weeks.  PT to start at 1-2 weeks.     Cuff specific program:  Pendulum exercises and Codman's exercises in 5-7 days, protecting rotator cuff repair for 1 week by avoiding active motion program until 1 week.     PASSIVE ROM: ER side 30 degrees, Forward Flex 90 degrees, ABD - 60 degrees   Full AAROM/PROM starting at 5-7 days as tolerated         Quality of tissue: fair      Quality of the repair: good       Size of tear: 10 x 15 mm, 60% partial thickness bursal     Subjective     Pt reports dropped her phone in the car and reached to grab it, notes a pulling pain  She was compliant with home exercise program.   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      Holly received therapeutic exercises to develop strength, endurance, ROM, flexibility, posture, and core stabilization for 10 minutes including:      Holly received the following manual therapy techniques: Joint mobilizations, Manual traction, Myofacial release, Manual Lymphatic Drainage, Soft tissue Mobilization, and Friction Massage were applied to the: R shld  for 9  minutes, including:  Scapular mobs, oscillation, R UT stretch,   Posterior capsular stretch, MET shld horizontal abd        Holly participated in neuromuscular re-education activities to improve: Balance, Coordination, Kinesthetic, Sense, Proprioception, and Posture for 25 minutes. The following activities were included:      Seated flexion wand 3# 30x  Sidelying ER 30 1#  Sidelying shld abd 2x15 #   Sidelying fleixon 2 x 15 1#    Not today   CC shld flexion 30x eccentric control  CC shld scap 30x eccentric control   Slot machines 2# 3x10  OH OTB pulsing shld flexion 5x/30"       Holly participated in dynamic functional therapeutic activities to improve functional performance for 25  minutes, including:    UBE 4'/4' level 6 for scap endurance and motion   Scaptions 1# 3 x 12  Row GTB 30x   Scap extension OTB 30x    NT  Wall slides 3x10/3"  Standing wall c/cc [place] simulating turning wheel 3x30 ea   Rows GTB with ER 3 x 10    IR/ER OTB conc/ecc 3x10       Home Exercises Provided and Patient Education Provided     Education provided:   - Compliance with HEP     Written Home Exercises Provided: yes.  Exercises were reviewed and Holly was able to demonstrate them prior to the end of the session.  Holly demonstrated good  understanding of the education provided.       Assessment     Patient assess within clinic today, watched her perform exercises from further away. Scaptions without hiking and able to lift 1# weight " slightly overhead to 110 degrees. Increased load with sidelying flexion, no limitation in motion. Cuff did fatigue with weight as expected. Able to hold 3# wand away from body to mimic reaching out for the wheel. Still nees to improve push/pull to drive safely, added resisted rows and to add more serratus press.    Holly Is progressing well towards her goals.   Pt prognosis is Good.     Pt will continue to benefit from skilled outpatient physical therapy to address the deficits listed in the problem list box on initial evaluation, provide pt/family education and to maximize pt's level of independence in the home and community environment.     Pt's spiritual, cultural and educational needs considered and pt agreeable to plan of care and goals.    Anticipated barriers to physical therapy: none     Goals: GOALS: Short Term Goals:  6 weeks (Updated 5/6/2024)  1.Report decreased shoulder pain < / =  2/10  to increase tolerance for return to ADLs out the sling(met)  2. Increase PROM 120 flexion and 25 ER to restore a more functional ROM (met)  3. Increased strength by 1/3 MMT grade in R shoulder scap stabilizers to increase tolerance for ADL and work activities.(met)  4. 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 Pt will have improved gcode of CJ (20-40% limited) on FOTO shoulder in order to demonstrate true functional improvement. (Progressing, not met)    Plan     Certification Period: 5/6/2024 to 8/6/2024  Recommended Treatment Plan: 2 times per week for 12 weeks: Manual  Therapy, Moist Heat/ Ice, Neuromuscular Re-ed, Patient Education, Self Care, Therapeutic Activities, and Therapeutic Exercise  Other Recommendations: modalities prn, ASTYM prn, Dry Needling prn    Therapist: Nasir De León, PT, DPT    I CERTIFY THE NEED FOR THESE SERVICES FURNISHED UNDER THIS PLAN OF TREATMENT AND WHILE UNDER MY CARE    Physician's comments: ________________________________________________________________________________________________________________________________________________      Physician's Name: ___________________________________

## 2024-05-06 NOTE — PROGRESS NOTES
Physical Therapy Daily Treatment Note     Name: Holly Julian  [facility] Number: [id number]  Therapy Diagnosis:        Encounter Diagnoses   Name Primary?    S/P right rotator cuff repair      Limited range of motion (ROM) of shoulder          Physician: Yessi Hahn MD     Physician Orders: PT Eval and Treat   Medical Diagnosis from Referral: M75.101 (ICD-10-CM) - Nontraumatic rotator cuff tear, right  Evaluation Date: 10/19/2023  Authorization Period Expiration: 7/9/2024  New Plan of Care Expiration: 8/6/2024  Visit # / Visits authorized: 6/12  Total visits 28  FOTO: 1/1     Precautions: Standard      Time In: 0700  Time Out: 759  Total Appointment Time (timed & untimed codes): 59 minutes     POSTOPERATIVE PLAN: We will follow the arthroscopic rotator cuff repair guidelines for a small size rotator cuff tear.  We discussed with the patient's family after surgery.  The patient will remain in a sling for 6 weeks.  PT to start at 1-2 weeks.     Cuff specific program:  Pendulum exercises and Codman's exercises in 5-7 days, protecting rotator cuff repair for 1 week by avoiding active motion program until 1 week.     PASSIVE ROM: ER side 30 degrees, Forward Flex 90 degrees, ABD - 60 degrees   Full AAROM/PROM starting at 5-7 days as tolerated         Quality of tissue: fair      Quality of the repair: good       Size of tear: 10 x 15 mm, 60% partial thickness bursal     Subjective     Pt reports dropped her phone in the car and reached to grab it, notes a pulling pain  She was compliant with home exercise program.   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      Holly received therapeutic exercises to develop strength, endurance, ROM, flexibility, posture, and core stabilization for 10 minutes including:      Holly received the following manual therapy techniques: Joint mobilizations, Manual traction, Myofacial release, Manual Lymphatic Drainage, Soft tissue Mobilization, and Friction Massage were applied to the: R shld  for 9  minutes, including:  Scapular mobs, oscillation, R UT stretch,   Posterior capsular stretch, MET shld horizontal abd        Holly participated in neuromuscular re-education activities to improve: Balance, Coordination, Kinesthetic, Sense, Proprioception, and Posture for 25 minutes. The following activities were included:      Seated flexion wand 3# 30x  Sidelying ER 30 1#  Sidelying shld abd 2x15 #   Sidelying fleixon 2 x 15 1#    Not today   CC shld flexion 30x eccentric control  CC shld scap 30x eccentric control   Slot machines 2# 3x10  OH OTB pulsing shld flexion 5x/30"       Holly participated in dynamic functional therapeutic activities to improve functional performance for 25  minutes, including:    UBE 4'/4' level 6 for scap endurance and motion   Scaptions 1# 3 x 12  Row GTB 30x   Scap extension OTB 30x    NT  Wall slides 3x10/3"  Standing wall c/cc [place] simulating turning wheel 3x30 ea   Rows GTB with ER 3 x 10    IR/ER OTB conc/ecc 3x10       Home Exercises Provided and Patient Education Provided     Education provided:   - Compliance with HEP     Written Home Exercises Provided: yes.  Exercises were reviewed and Holly was able to demonstrate them prior to the end of the session.  Holly demonstrated good  understanding of the education provided.       Assessment     Patient assess within clinic today, watched her perform exercises from further away. Scaptions without hiking and able to lift 1# weight " slightly overhead to 110 degrees. Increased load with sidelying flexion, no limitation in motion. Cuff did fatigue with weight as expected. Able to hold 3# wand away from body to mimic reaching out for the wheel. Still nees to improve push/pull to drive safely, added resisted rows and to add more serratus press.    Holly Is progressing well towards her goals.   Pt prognosis is Good.     Pt will continue to benefit from skilled outpatient physical therapy to address the deficits listed in the problem list box on initial evaluation, provide pt/family education and to maximize pt's level of independence in the home and community environment.     Pt's spiritual, cultural and educational needs considered and pt agreeable to plan of care and goals.    Anticipated barriers to physical therapy: none     Goals: GOALS: Short Term Goals:  6 weeks (Updated 5/6/2024)  1.Report decreased shoulder pain < / =  2/10  to increase tolerance for return to ADLs out the sling(met)  2. Increase PROM 120 flexion and 25 ER to restore a more functional ROM (met)  3. Increased strength by 1/3 MMT grade in R shoulder scap stabilizers to increase tolerance for ADL and work activities.(met)  4. 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 Pt will have improved gcode of CJ (20-40% limited) on FOTO shoulder in order to demonstrate true functional improvement. (Progressing, not met)    Plan     Certification Period: 5/6/2024 to 8/6/2024  Recommended Treatment Plan: 2 times per week for 12 weeks: Manual  Therapy, Moist Heat/ Ice, Neuromuscular Re-ed, Patient Education, Self Care, Therapeutic Activities, and Therapeutic Exercise  Other Recommendations: modalities prn, ASTYM prn, Dry Needling prn    Therapist: Nasir De León, PT, DPT    I CERTIFY THE NEED FOR THESE SERVICES FURNISHED UNDER THIS PLAN OF TREATMENT AND WHILE UNDER MY CARE    Physician's comments: ________________________________________________________________________________________________________________________________________________      Physician's Name: ___________________________________

## 2024-05-07 ENCOUNTER — HOSPITAL ENCOUNTER (OUTPATIENT)
Dept: RADIOLOGY | Facility: HOSPITAL | Age: 54
Discharge: HOME | End: 2024-05-07
Attending: ORTHOPAEDIC SURGERY
Payer: OTHER MISCELLANEOUS

## 2024-05-07 PROCEDURE — 72141 MRI NECK SPINE W/O DYE: CPT | Mod: 26,,, | Performed by: INTERNAL MEDICINE

## 2024-05-07 PROCEDURE — 72141 MRI NECK SPINE W/O DYE: CPT | Mod: TC

## 2024-05-08 ENCOUNTER — OFFICE VISIT (OUTPATIENT)
Dept: ORTHOPEDICS | Facility: CLINIC | Age: 54
End: 2024-05-08
Payer: OTHER MISCELLANEOUS

## 2024-05-08 DIAGNOSIS — M25.511 CHRONIC RIGHT SHOULDER PAIN: ICD-10-CM

## 2024-05-08 DIAGNOSIS — M54.12 CERVICAL RADICULOPATHY: Primary | ICD-10-CM

## 2024-05-08 DIAGNOSIS — G89.29 CHRONIC RIGHT SHOULDER PAIN: ICD-10-CM

## 2024-05-08 PROCEDURE — 99499 UNLISTED E&M SERVICE: CPT | Mod: 95,,, | Performed by: ORTHOPAEDIC SURGERY

## 2024-05-08 RX ORDER — NAPROXEN 500 MG/1
500 TABLET ORAL 2 TIMES DAILY PRN
Qty: 60 TABLET | Refills: 3 | Status: SHIPPED | OUTPATIENT
Start: 2024-05-08

## 2024-05-08 NOTE — TELEPHONE ENCOUNTER
No care due was identified.  [facility] Embedded Care Due Messages. Reference number: [id number].   5/08/2024 12:41:33 AM CDT

## 2024-05-08 NOTE — PROGRESS NOTES
Spoke with pt virtually. Pt was last seen 4/16/24 and continues to have neck and right arm pain. Pt has tried home exercises and PT with no relief. Pain is 8/10. I have provided the patient with a home exercise program. It is the [employer] cervical exercise program. Exercises include walking erectly with neutral head position, supine neutral head position, supine retraction, sitting or standing neck retraction, posture training, forward/backward/sideward isometric strengthening, prone head lifts, supine head lifts, scapular retraction, and neck rotation. Pt completes each exercise daily for one hour with worsening of pain. MRI demonstrates mild degenerative changes and neural foraminal narrowing without central stenosis. Will order C6-7 ROSIO (aim right) with pain management. Pt will fu if pain persists.

## 2024-05-13 ENCOUNTER — CLINICAL SUPPORT (OUTPATIENT)
Dept: REHABILITATION | Facility: HOSPITAL | Age: 54
End: 2024-05-13
Payer: OTHER MISCELLANEOUS

## 2024-05-13 DIAGNOSIS — M25.511 ACUTE PAIN OF RIGHT SHOULDER: Primary | ICD-10-CM

## 2024-05-13 DIAGNOSIS — M25.619 LIMITED RANGE OF MOTION (ROM) OF SHOULDER: ICD-10-CM

## 2024-05-13 PROCEDURE — 97112 NEUROMUSCULAR REEDUCATION: CPT | Performed by: PHYSICAL THERAPIST

## 2024-05-13 PROCEDURE — 97140 MANUAL THERAPY 1/> REGIONS: CPT | Performed by: PHYSICAL THERAPIST

## 2024-05-13 PROCEDURE — 97530 THERAPEUTIC ACTIVITIES: CPT | Performed by: PHYSICAL THERAPIST

## 2024-05-13 NOTE — PROGRESS NOTES
Physical Therapy Daily Treatment Note     Name: Holly Julian  [facility] Number: [id number]  Therapy Diagnosis:        Encounter Diagnoses   Name Primary?    S/P right rotator cuff repair      Limited range of motion (ROM) of shoulder          Physician: Yessi Hahn MD     Physician Orders: PT Eval and Treat   Medical Diagnosis from Referral: M75.101 (ICD-10-CM) - Nontraumatic rotator cuff tear, right  Evaluation Date: 10/19/2023  Authorization Period Expiration: 7/9/2024  New Plan of Care Expiration: 8/6/2024  Visit # / Visits authorized: 7/12  Total visits 28  FOTO: 1/1     Precautions: Standard      Time In: 0700  Time Out: 755  Total Appointment Time (timed & untimed codes): 55 minutes     POSTOPERATIVE PLAN: We will follow the arthroscopic rotator cuff repair guidelines for a small size rotator cuff tear.  We discussed with the patient's family after surgery.  The patient will remain in a sling for 6 weeks.  PT to start at 1-2 weeks.     Cuff specific program:  Pendulum exercises and Codman's exercises in 5-7 days, protecting rotator cuff repair for 1 week by avoiding active motion program until 1 week.     PASSIVE ROM: ER side 30 degrees, Forward Flex 90 degrees, ABD - 60 degrees   Full AAROM/PROM starting at 5-7 days as tolerated         Quality of tissue: fair      Quality of the repair: good       Size of tear: 10 x 15 mm, 60% partial thickness bursal     Subjective     Pt reports nice mother's day with the family,  did the cooking  She was compliant with home exercise program.   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      Holly received therapeutic exercises to develop strength, endurance, ROM, flexibility, posture, and core stabilization for 0 minutes including:      Holly received the following manual therapy techniques: Joint mobilizations, Manual traction, Myofacial release, Manual Lymphatic Drainage, Soft tissue Mobilization, and Friction Massage were applied to the: R shld  for 15  minutes, including:  Scapular mobs, oscillation, R UT stretch,   Posterior capsular stretch, MET shld horizontal abd        Holly participated in neuromuscular re-education activities to improve: Balance, Coordination, Kinesthetic, Sense, Proprioception, and Posture for 25 minutes. The following activities were included:    Seated flexion wand 3# 30x  Sidelying ER 30 1#  Sidelying shld abd 2x15 #1   Sidelying fleixon 2 x 15 1#    Not today   CC shld flexion 30x eccentric control  CC shld scap 30x eccentric control   Slot machines 2# 3x10  OH OTB pulsing shld flexion 5x/30"       Holly participated in dynamic functional therapeutic activities to improve functional performance for 15  minutes, including:    UBE 4'/4' level 6 for scap endurance and motion   Scaptions 1# 3 x 12    NT  Row GTB 30x   Scap extension OTB 30x  Wall slides 3x10/3"  Standing wall c/cc [place] simulating turning wheel 3x30 ea   Rows GTB with ER 3 x 10    IR/ER OTB conc/ecc 3x10       Home Exercises Provided and Patient Education Provided     Education provided:   - Compliance with HEP     Written Home Exercises Provided: yes.  Exercises were reviewed and Holly was able to demonstrate them prior to the end of the session.  Holly demonstrated good  understanding of the education provided.       Assessment     Patient returned to clinic with similar motion to the shoulder. She was able to complete 1# loading to the shoulder within 0-120 degrees of " motion. Able to push herself up onto the table with UE.     Holly Is progressing well towards her goals.   Pt prognosis is Good.     Pt will continue to benefit from skilled outpatient physical therapy to address the deficits listed in the problem list box on initial evaluation, provide pt/family education and to maximize pt's level of independence in the home and community environment.     Pt's spiritual, cultural and educational needs considered and pt agreeable to plan of care and goals.    Anticipated barriers to physical therapy: none     Goals: GOALS: Short Term Goals:  6 weeks (Updated 5/6/2024)  1.Report decreased shoulder pain < / =  2/10  to increase tolerance for return to ADLs out the sling(met)  2. Increase PROM 120 flexion and 25 ER to restore a more functional ROM (met)  3. Increased strength by 1/3 MMT grade in R shoulder scap stabilizers to increase tolerance for ADL and work activities.(met)  4. 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 Pt will have improved gcode of CJ (20-40% limited) on FOTO shoulder in order to demonstrate true functional improvement. (Progressing, not met)    Plan     Certification Period: 5/6/2024 to 8/6/2024  Recommended Treatment Plan: 2 times per week for 12 weeks: Manual Therapy, Moist Heat/ Ice, Neuromuscular Re-ed, Patient Education, Self Care, Therapeutic Activities, and Therapeutic Exercise  Other Recommendations: modalities prn, ASTYM prn, Dry Needling prn    Therapist: Nasir De León, PT, DPT    I CERTIFY THE NEED FOR  THESE SERVICES FURNISHED UNDER THIS PLAN OF TREATMENT AND WHILE UNDER MY CARE    Physician's comments: ________________________________________________________________________________________________________________________________________________      Physician's Name: ___________________________________

## 2024-05-20 ENCOUNTER — CLINICAL SUPPORT (OUTPATIENT)
Dept: REHABILITATION | Facility: HOSPITAL | Age: 54
End: 2024-05-20
Payer: OTHER MISCELLANEOUS

## 2024-05-20 DIAGNOSIS — M25.619 LIMITED RANGE OF MOTION (ROM) OF SHOULDER: ICD-10-CM

## 2024-05-20 DIAGNOSIS — M25.511 ACUTE PAIN OF RIGHT SHOULDER: Primary | ICD-10-CM

## 2024-05-20 PROCEDURE — 97140 MANUAL THERAPY 1/> REGIONS: CPT | Mod: CQ

## 2024-05-20 PROCEDURE — 97530 THERAPEUTIC ACTIVITIES: CPT | Mod: CQ

## 2024-05-20 PROCEDURE — 97112 NEUROMUSCULAR REEDUCATION: CPT | Mod: CQ

## 2024-05-27 ENCOUNTER — CLINICAL SUPPORT (OUTPATIENT)
Dept: REHABILITATION | Facility: HOSPITAL | Age: 54
End: 2024-05-27
Payer: OTHER MISCELLANEOUS

## 2024-05-27 DIAGNOSIS — M25.619 LIMITED RANGE OF MOTION (ROM) OF SHOULDER: ICD-10-CM

## 2024-05-27 DIAGNOSIS — M25.511 ACUTE PAIN OF RIGHT SHOULDER: Primary | ICD-10-CM

## 2024-05-27 PROCEDURE — 97530 THERAPEUTIC ACTIVITIES: CPT | Performed by: PHYSICAL THERAPIST

## 2024-05-27 PROCEDURE — 97112 NEUROMUSCULAR REEDUCATION: CPT | Performed by: PHYSICAL THERAPIST

## 2024-05-27 PROCEDURE — 97140 MANUAL THERAPY 1/> REGIONS: CPT | Performed by: PHYSICAL THERAPIST

## 2024-05-27 NOTE — PROGRESS NOTES
Physical Therapy Daily Treatment Note     Name: Holly Julian  [facility] Number: [id number]  Therapy Diagnosis:        Encounter Diagnoses   Name Primary?    S/P right rotator cuff repair      Limited range of motion (ROM) of shoulder          Physician: Yessi Hahn MD     Physician Orders: PT Eval and Treat   Medical Diagnosis from Referral: M75.101 (ICD-10-CM) - Nontraumatic rotator cuff tear, right  Evaluation Date: 10/19/2023  Authorization Period Expiration: 7/9/2024  New Plan of Care Expiration: 8/6/2024  Visit # / Visits authorized: 9/12  Total visits 28  FOTO: 1/1     Precautions: Standard      Time In: 0700  Time Out: 0800  Total Appointment Time (timed & untimed codes): 60 minutes     POSTOPERATIVE PLAN: We will follow the arthroscopic rotator cuff repair guidelines for a small size rotator cuff tear.  We discussed with the patient's family after surgery.  The patient will remain in a sling for 6 weeks.  PT to start at 1-2 weeks.     Cuff specific program:  Pendulum exercises and Codman's exercises in 5-7 days, protecting rotator cuff repair for 1 week by avoiding active motion program until 1 week.     PASSIVE ROM: ER side 30 degrees, Forward Flex 90 degrees, ABD - 60 degrees   Full AAROM/PROM starting at 5-7 days as tolerated         Quality of tissue: fair      Quality of the repair: good       Size of tear: 10 x 15 mm, 60% partial thickness bursal     Subjective     Pt reports getting an injection in her neck later this week for some nerve issues  Response to previous treatment: soreness   Functional change: improved functional mobility, but still pain at night     Pain: 4/10  Location: right shoulder      Objective     5/6/2024:  Active flexion to 130 degrees, scaption 1# no hiking or visual signs of discomfort  ER 40 deg arm by side  Good scap mobility in sidelying  ER 4/5, able perform with 1# weight today  Improved endurance to shoulder on UBE level 6  Arms outstretched on 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      Holly received therapeutic exercises to develop strength, endurance, ROM, flexibility, posture, and core stabilization for 0 minutes including:      Holly received the following manual therapy techniques: Joint mobilizations, Manual traction, Myofacial release, Manual Lymphatic Drainage, Soft tissue Mobilization, and Friction Massage were applied to the: R shld  for 15  minutes, including:  Scapular mobs, oscillation, R UT stretch,   Posterior capsular stretch, MET shld horizontal abd        Holly participated in neuromuscular re-education activities to improve: Balance, Coordination, Kinesthetic, Sense, Proprioception, and Posture for 25 minutes. The following activities were included:    Supine ER OTB 3 x 15  No money OTB 30x 3"  Prone extensions 2# 3 x 15   Prone row 4# 3 x 15    Not   Seated flexion wand 3# 30x  Sidelying ER 30 1#  Sidelying shld abd 2x15 #1   Sidelying fleixon 2 x 15 1#  Pulley 5 shld flexion ROM   CC shld flexion 30x eccentric control  CC shld scap 30x eccentric control   Slot machines 2# 3x10  OH OTB pulsing shld flexion 5x/30"       Holly participated in dynamic functional therapeutic activities to improve functional performance for 25  minutes, including:  UBE 5'/5' level 6 for scap endurance and motion   Scaptions 1# 3 x 12  Serratus wall slide YTB 30x    NT  Row GTB 30x   Scap extension OTB 30x  Wall slides 3x10/3"  Standing wall c/cc [place] simulating turning wheel 3x30 ea   Rows GTB with ER 3 x 10  IR/ER OTB conc/ecc 3x10       Home Exercises Provided and Patient Education Provided     Education provided:   - Compliance with HEP     Written Home Exercises Provided: yes.  Exercises were reviewed and Holly was able to demonstrate them prior to the end of the session.  Holly demonstrated good  understanding of the education provided.       Assessment     Holly progressed with strengthening today overhead at the wall. She fatigued within session but overhead " motion did look improved with wall slides. She's hoping injection to the neck helps her with pain she is feeling on top of shoulder    Holly Is progressing well towards her goals.   Pt prognosis is Good.     Pt will continue to benefit from skilled outpatient physical therapy to address the deficits listed in the problem list box on initial evaluation, provide pt/family education and to maximize pt's level of independence in the home and community environment.     Pt's spiritual, cultural and educational needs considered and pt agreeable to plan of care and goals.    Anticipated barriers to physical therapy: none     Goals: GOALS: Short Term Goals:  6 weeks (Updated 5/6/2024)  1.Report decreased shoulder pain < / =  2/10  to increase tolerance for return to ADLs out the sling(met)  2. Increase PROM 120 flexion and 25 ER to restore a more functional ROM (met)  3. Increased strength by 1/3 MMT grade in R shoulder scap stabilizers to increase tolerance for ADL and work activities.(met)  4. 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 Pt will have improved gcode of CJ (20-40% limited) on FOTO shoulder in order to demonstrate true functional improvement. (Progressing, not met)    Plan     Certification Period: 5/6/2024 to 8/6/2024  Recommended Treatment Plan: 2 times per week for 12 weeks: Manual Therapy, Moist Heat/ Ice, Neuromuscular Re-ed, Patient Education, Self Care, Therapeutic Activities, and Therapeutic Exercise  Other Recommendations: modalities prn, ASTYM prn, Dry  Needling prn    Therapist: Nasir De León, PT, DPT

## 2024-06-03 ENCOUNTER — CLINICAL SUPPORT (OUTPATIENT)
Dept: REHABILITATION | Facility: HOSPITAL | Age: 54
End: 2024-06-03
Payer: OTHER MISCELLANEOUS

## 2024-06-03 DIAGNOSIS — M25.619 LIMITED RANGE OF MOTION (ROM) OF SHOULDER: ICD-10-CM

## 2024-06-03 DIAGNOSIS — M25.511 ACUTE PAIN OF RIGHT SHOULDER: Primary | ICD-10-CM

## 2024-06-03 PROCEDURE — 97530 THERAPEUTIC ACTIVITIES: CPT | Performed by: PHYSICAL THERAPIST

## 2024-06-03 PROCEDURE — 97112 NEUROMUSCULAR REEDUCATION: CPT | Performed by: PHYSICAL THERAPIST

## 2024-06-03 PROCEDURE — 97140 MANUAL THERAPY 1/> REGIONS: CPT | Performed by: PHYSICAL THERAPIST

## 2024-06-03 NOTE — PLAN OF CARE
Physical Therapy Daily Treatment Note     Name: Holly HEREDIA [facility] Number: [id number]  Therapy Diagnosis:        Encounter Diagnoses   Name Primary?    S/P right rotator cuff repair      Limited range of motion (ROM) of shoulder          Physician: Yessi Hahn MD     Physician Orders: PT Eval and Treat   Medical Diagnosis from Referral: M75.101 (ICD-10-CM) - Nontraumatic rotator cuff tear, right  Evaluation Date: 10/19/2023  Authorization Period Expiration: 7/9/2024  New Plan of Care Expiration: 8/6/2024  Visit # / Visits authorized: 10/12  Total visits 37  FOTO: 1/1     Precautions: Standard      Time In: 0700  Time Out: 0800  Total Appointment Time (timed & untimed codes): 55 minutes     POSTOPERATIVE PLAN: We will follow the arthroscopic rotator cuff repair guidelines for a small size rotator cuff tear.  We discussed with the patient's family after surgery.  The patient will remain in a sling for 6 weeks.  PT to start at 1-2 weeks.     Cuff specific program:  Pendulum exercises and Codman's exercises in 5-7 days, protecting rotator cuff repair for 1 week by avoiding active motion program until 1 week.     PASSIVE ROM: ER side 30 degrees, Forward Flex 90 degrees, ABD - 60 degrees   Full AAROM/PROM starting at 5-7 days as tolerated         Quality of tissue: fair      Quality of the repair: good       Size of tear: 10 x 15 mm, 60% partial thickness bursal     Subjective     Pt reports Notes some throbbing in the shoulder, waiting on approval for shot from worker's comp. 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      Holly received therapeutic exercises to develop strength,  "endurance, ROM, flexibility, posture, and core stabilization for 0 minutes including:      Holly received the following manual therapy techniques: Joint mobilizations, Manual traction, Myofacial release, Manual Lymphatic Drainage, Soft tissue Mobilization, and Friction Massage were applied to the: R shld  for 15  minutes, including:  Scapular mobs, oscillation, R UT stretch,   Posterior capsular stretch, MET shld horizontal abd        Holly participated in neuromuscular re-education activities to improve: Balance, Coordination, Kinesthetic, Sense, Proprioception, and Posture for 25 minutes. The following activities were included:    Sidelying flexion 1# 3 x 12  Sidelying ER 1# 3 X 12  No money OTB 30x 3" with MH    Not   Supine ER OTB 3 x 15  Prone extensions 2# 3 x 15   Prone row 4# 3 x 15  Seated flexion wand 3# 30x  Sidelying ER 30 1#  Sidelying shld abd 2x15 #1   Sidelying fleixon 2 x 15 1#  [place] 5 shld flexion ROM   CC shld flexion 30x eccentric control  CC shld scap 30x eccentric control   Slot machines 2# 3x10  OH OTB pulsing shld flexion 5x/30"       Holly participated in dynamic functional therapeutic activities to improve functional performance for 15  minutes, including:  UBE 5'/5' level 6 for scap endurance and motion   Wand 2# flexion     NT  Scaptions 1# 3 x 12  Serratus wall slide YTB 30x  Row GTB 30x   Scap extension OTB 30x  Wall slides 3x10/3"  Standing wall c/cc [place] simulating turning wheel 3x30 ea   Rows GTB with ER 3 x 10  IR/ER OTB conc/ecc 3x10    MH 10' to neck with no money exercise GTB     Home Exercises Provided and Patient Education Provided     Education provided:   - Compliance with HEP     Written Home Exercises Provided: yes.  Exercises were reviewed and Holly was able to demonstrate them prior to the end of the session.  Holly demonstrated good  understanding of the education provided.       Assessment     Limited strength in the shoulder today. Notes she feels like she " will drop objects at home. Requested heat for her neck due to pinching pain. Lifted 2# wand BUE to shoulder height. FOTO score down to 5, almost same score as POD1. Reports doing her HEP every day but not getting any better.    Holly Is progressing well towards her goals.   Pt prognosis is Good.     Pt will continue to benefit from skilled outpatient physical therapy to address the deficits listed in the problem list box on initial evaluation, provide pt/family education and to maximize pt's level of independence in the home and community environment.     Pt's spiritual, cultural and educational needs considered and pt agreeable to plan of care and goals.    Anticipated barriers to physical therapy: none     Goals: GOALS: Short Term Goals:  6 weeks (Updated 6/3/2024)  1.Report decreased shoulder pain < / =  2/10  to increase tolerance for return to ADLs out the sling(Progressing, not met)  2. Increase PROM 120 flexion and 25 ER to restore a more functional ROM (met)  3. Increased strength by 1/3 MMT grade in R shoulder scap stabilizers to increase tolerance for ADL and work activities.(met)  4. 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 Pt will have improved gcode of CJ (20-40% limited) on FOTO shoulder in order to demonstrate true functional improvement. (Progressing, not met)    Plan     Certification Period: 5/6/2024 to 8/6/2024  Recommended Treatment Plan: 2 times per week for 12 weeks: Manual Therapy, Moist Heat/ Ice, Neuromuscular Re-ed, Patient Education, Self  Care, Therapeutic Activities, and Therapeutic Exercise  Other Recommendations: modalities prn, ASTYM prn, Dry Needling prn    Therapist: Nasir De León, PT, DPT

## 2024-06-03 NOTE — PROGRESS NOTES
Physical Therapy Daily Treatment Note     Name: Holly HEREDIA [facility] Number: [id number]  Therapy Diagnosis:        Encounter Diagnoses   Name Primary?    S/P right rotator cuff repair      Limited range of motion (ROM) of shoulder          Physician: Yessi Hahn MD     Physician Orders: PT Eval and Treat   Medical Diagnosis from Referral: M75.101 (ICD-10-CM) - Nontraumatic rotator cuff tear, right  Evaluation Date: 10/19/2023  Authorization Period Expiration: 7/9/2024  New Plan of Care Expiration: 8/6/2024  Visit # / Visits authorized: 10/12  Total visits 37  FOTO: 1/1     Precautions: Standard      Time In: 0700  Time Out: 0800  Total Appointment Time (timed & untimed codes): 55 minutes     POSTOPERATIVE PLAN: We will follow the arthroscopic rotator cuff repair guidelines for a small size rotator cuff tear.  We discussed with the patient's family after surgery.  The patient will remain in a sling for 6 weeks.  PT to start at 1-2 weeks.     Cuff specific program:  Pendulum exercises and Codman's exercises in 5-7 days, protecting rotator cuff repair for 1 week by avoiding active motion program until 1 week.     PASSIVE ROM: ER side 30 degrees, Forward Flex 90 degrees, ABD - 60 degrees   Full AAROM/PROM starting at 5-7 days as tolerated         Quality of tissue: fair      Quality of the repair: good       Size of tear: 10 x 15 mm, 60% partial thickness bursal     Subjective     Pt reports Notes some throbbing in the shoulder, waiting on approval for shot from worker's comp. 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      Holly received therapeutic exercises to develop strength,  "endurance, ROM, flexibility, posture, and core stabilization for 0 minutes including:      Holly received the following manual therapy techniques: Joint mobilizations, Manual traction, Myofacial release, Manual Lymphatic Drainage, Soft tissue Mobilization, and Friction Massage were applied to the: R shld  for 15  minutes, including:  Scapular mobs, oscillation, R UT stretch,   Posterior capsular stretch, MET shld horizontal abd        Holly participated in neuromuscular re-education activities to improve: Balance, Coordination, Kinesthetic, Sense, Proprioception, and Posture for 25 minutes. The following activities were included:    Sidelying flexion 1# 3 x 12  Sidelying ER 1# 3 X 12  No money OTB 30x 3" with MH    Not   Supine ER OTB 3 x 15  Prone extensions 2# 3 x 15   Prone row 4# 3 x 15  Seated flexion wand 3# 30x  Sidelying ER 30 1#  Sidelying shld abd 2x15 #1   Sidelying fleixon 2 x 15 1#  [place] 5 shld flexion ROM   CC shld flexion 30x eccentric control  CC shld scap 30x eccentric control   Slot machines 2# 3x10  OH OTB pulsing shld flexion 5x/30"       Holly participated in dynamic functional therapeutic activities to improve functional performance for 15  minutes, including:  UBE 5'/5' level 6 for scap endurance and motion   Wand 2# flexion     NT  Scaptions 1# 3 x 12  Serratus wall slide YTB 30x  Row GTB 30x   Scap extension OTB 30x  Wall slides 3x10/3"  Standing wall c/cc [place] simulating turning wheel 3x30 ea   Rows GTB with ER 3 x 10  IR/ER OTB conc/ecc 3x10    MH 10' to neck with no money exercise GTB     Home Exercises Provided and Patient Education Provided     Education provided:   - Compliance with HEP     Written Home Exercises Provided: yes.  Exercises were reviewed and Holly was able to demonstrate them prior to the end of the session.  Holly demonstrated good  understanding of the education provided.       Assessment     Limited strength in the shoulder today. Notes she feels like she " will drop objects at home. Requested heat for her neck due to pinching pain. Lifted 2# wand BUE to shoulder height. FOTO score down to 5, almost same score as POD1. Reports doing her HEP every day but not getting any better.    Holly Is progressing well towards her goals.   Pt prognosis is Good.     Pt will continue to benefit from skilled outpatient physical therapy to address the deficits listed in the problem list box on initial evaluation, provide pt/family education and to maximize pt's level of independence in the home and community environment.     Pt's spiritual, cultural and educational needs considered and pt agreeable to plan of care and goals.    Anticipated barriers to physical therapy: none     Goals: GOALS: Short Term Goals:  6 weeks (Updated 6/3/2024)  1.Report decreased shoulder pain < / =  2/10  to increase tolerance for return to ADLs out the sling(Progressing, not met)  2. Increase PROM 120 flexion and 25 ER to restore a more functional ROM (met)  3. Increased strength by 1/3 MMT grade in R shoulder scap stabilizers to increase tolerance for ADL and work activities.(met)  4. 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 Pt will have improved gcode of CJ (20-40% limited) on FOTO shoulder in order to demonstrate true functional improvement. (Progressing, not met)    Plan     Certification Period: 5/6/2024 to 8/6/2024  Recommended Treatment Plan: 2 times per week for 12 weeks: Manual Therapy, Moist Heat/ Ice, Neuromuscular Re-ed, Patient Education, Self  Care, Therapeutic Activities, and Therapeutic Exercise  Other Recommendations: modalities prn, ASTYM prn, Dry Needling prn    Therapist: Nasir De León, PT, DPT

## 2024-06-10 ENCOUNTER — OFFICE VISIT (OUTPATIENT)
Dept: SPORTS MEDICINE | Facility: CLINIC | Age: 54
End: 2024-06-10
Payer: OTHER MISCELLANEOUS

## 2024-06-10 ENCOUNTER — CLINICAL SUPPORT (OUTPATIENT)
Dept: REHABILITATION | Facility: HOSPITAL | Age: 54
End: 2024-06-10
Payer: OTHER MISCELLANEOUS

## 2024-06-10 VITALS — WEIGHT: 213.88 LBS | BODY MASS INDEX: 43.19 KG/M2

## 2024-06-10 DIAGNOSIS — M25.511 ACUTE PAIN OF RIGHT SHOULDER: Primary | ICD-10-CM

## 2024-06-10 DIAGNOSIS — Z98.890 S/P RIGHT ROTATOR CUFF REPAIR: Primary | ICD-10-CM

## 2024-06-10 DIAGNOSIS — M25.619 LIMITED RANGE OF MOTION (ROM) OF SHOULDER: ICD-10-CM

## 2024-06-10 DIAGNOSIS — M79.2 RADICULAR PAIN IN RIGHT ARM: ICD-10-CM

## 2024-06-10 PROCEDURE — 97530 THERAPEUTIC ACTIVITIES: CPT | Performed by: PHYSICAL THERAPIST

## 2024-06-10 PROCEDURE — 20610 DRAIN/INJ JOINT/BURSA W/O US: CPT | Mod: RT,S$GLB,, | Performed by: ORTHOPAEDIC SURGERY

## 2024-06-10 PROCEDURE — 99999 PR PBB SHADOW E&M-EST. PATIENT-LVL II: CPT | Mod: PBBFAC,,, | Performed by: ORTHOPAEDIC SURGERY

## 2024-06-10 PROCEDURE — 99214 OFFICE O/P EST MOD 30 MIN: CPT | Mod: 25,S$GLB,, | Performed by: ORTHOPAEDIC SURGERY

## 2024-06-10 PROCEDURE — 97112 NEUROMUSCULAR REEDUCATION: CPT | Performed by: PHYSICAL THERAPIST

## 2024-06-10 RX ORDER — TRIAMCINOLONE ACETONIDE 40 MG/ML
80 INJECTION, SUSPENSION INTRA-ARTICULAR; INTRAMUSCULAR
Status: DISCONTINUED | OUTPATIENT
Start: 2024-06-10 | End: 2024-06-10 | Stop reason: HOSPADM

## 2024-06-10 RX ADMIN — TRIAMCINOLONE ACETONIDE 80 MG: 40 INJECTION, SUSPENSION INTRA-ARTICULAR; INTRAMUSCULAR at 11:06

## 2024-06-10 NOTE — PROCEDURES
Large Joint Aspiration/Injection: R subacromial bursa    Date/Time: 6/10/2024 11:15 AM    Performed by: Yessi Hahn MD  Authorized by: Yessi Hahn MD    Consent Done?:  Yes (Verbal)  Indications:  Pain  Site marked: the procedure site was marked    Timeout: prior to procedure the correct patient, procedure, and site was verified    Prep: patient was prepped and draped in usual sterile fashion      Details:  Needle Size:  22 G  Approach:  Posterior  Location:  Shoulder  Site:  R subacromial bursa  Medications:  80 mg triamcinolone acetonide 40 mg/mL  Patient tolerance:  Patient tolerated the procedure well with no immediate complications

## 2024-06-10 NOTE — PROGRESS NOTES
Chief Complaint: right shoulder follow up    HISTORY OF PRESENT ILLNESS:   Pt is here today for 8 month follow up of shoulder arthroscopy.   We have reviewed patient's findings and discussed plan of care and future treatment options.      Patient has been attending physical therapy at the [facility] location, working with Yenny. She states she has developed a radiating pain beginning in her neck/trap area, radiating into her mid forearm and down into her long finger.     She was seen and evaluated by the spine team and an Cervical MRI was obtained. Per the spine's team plan she is to have a ROSIO injection targeting the right C6-7 region.    Seh was given Mobic 15mg daily at her last appointment but she states it hurts her stomach and she stopped it, medrol dose pack prescribed then as well  and she states she didn't get any relief after taking it.     Work Comp, she reports she has not returned to work yet and feels like she can't until she is completely pain free.     SANE 50/100  VAS 6/10      DATE OF PROCEDURE: 10/03/2023  right  1. Shoulder arthroscopic rotator cuff repair (CPT 49027) with CuffMend ()  2. Shoulder arthroscopic biceps tenodesis (CPT 89246)  3. Shoulder arthroscopic subacromial decompression, bursectomy   4. Shoulder arthroscopic extensive debridement (anterior, posterior glenohumeral joint, subacromial space) (CPT 17909)  5. Shoulder arthroscopic labral debridement (CPT 13359)  6. Shoulder arthroscopic lysis of adhesions (CPT 93854)  7. Shoulder arthroscopic distal clavicle excision (CPT 25426)     Size of tear: 10 x 15 mm, 60% partial thickness bursal          Review of Systems   Constitution: Negative. Negative for chills, fever and night sweats.   HENT: Negative for congestion and headaches.    Eyes: Negative for blurred vision, left vision loss and right vision loss.   Cardiovascular: Negative for chest pain and syncope.   Respiratory: Negative for cough and shortness of  breath.    Endocrine: Negative for polydipsia, polyphagia and polyuria.   Hematologic/Lymphatic: Negative for bleeding problem. Does not bruise/bleed easily.   Skin: Negative for dry skin, itching and rash.   Musculoskeletal: Negative for falls and muscle weakness.   Gastrointestinal: Negative for abdominal pain and bowel incontinence.   Genitourinary: Negative for bladder incontinence and nocturia.   Neurological: Negative for disturbances in coordination, loss of balance and seizures.   Psychiatric/Behavioral: Negative for depression. The patient does not have insomnia.    Allergic/Immunologic: Negative for hives and persistent infections.       PAST MEDICAL HISTORY:   Past Medical History:   Diagnosis Date    Essential (primary) hypertension      PAST SURGICAL HISTORY:   Past Surgical History:   Procedure Laterality Date    ARTHROSCOPIC DEBRIDEMENT OF SHOULDER Right 10/3/2023    Procedure: EXTENSIVE DEBRIDEMENT, SHOULDER, ARTHROSCOPIC;  Surgeon: Yessi Hahn MD;  Location: [facility] OR;  Service: Orthopedics;  Laterality: Right;    ARTHROSCOPIC REPAIR OF ROTATOR CUFF OF SHOULDER Right 10/3/2023    Procedure: REPAIR, ROTATOR CUFF, ARTHROSCOPIC WITH CUFF MEND;  Surgeon: Yessi Hahn MD;  Location: [facility] OR;  Service: Orthopedics;  Laterality: Right;    ARTHROSCOPY OF SHOULDER WITH DECOMPRESSION OF SUBACROMIAL SPACE Right 10/3/2023    Procedure: ARTHROSCOPY, SHOULDER, WITH SUBACROMIAL  DECOMPRESSION/ BURSECTOMY;  Surgeon: Yessi Hahn MD;  Location: [facility] OR;  Service: Orthopedics;  Laterality: Right;    ARTHROSCOPY,SHOULDER,WITH BICEPS TENODESIS Right 10/3/2023    Procedure: ARTHROSCOPY,SHOULDER,WITH BICEPS TENODESIS;  Surgeon: Yessi Hahn MD;  Location: [facility] OR;  Service: Orthopedics;  Laterality: Right;     SECTION  2001    DISTAL CLAVICLE EXCISION Right 10/3/2023    Procedure: EXCISION, CLAVICLE, DISTAL;  Surgeon: Yessi Hahn MD;  Location: [facility] OR;  Service: Orthopedics;  Laterality: Right;    LYSIS,  ADHESIONS, SHOULDER, ARTHROSCOPIC Right 10/3/2023    Procedure: LYSIS, ADHESIONS, SHOULDER, ARTHROSCOPIC;  Surgeon: Yessi Hahn MD;  Location: [facility] OR;  Service: Orthopedics;  Laterality: Right;    SHOULDER ARTHROSCOPY Right 10/3/2023    Procedure: ARTHROSCOPY, SHOULDER WITH L;ABRAL DEBRIDEMENT;  Surgeon: Yessi Hahn MD;  Location: [facility] OR;  Service: Orthopedics;  Laterality: Right;     FAMILY HISTORY: No family history on file.  SOCIAL HISTORY:   Social History     Socioeconomic History    Marital status: Single   Tobacco Use    Smoking status: Never    Smokeless tobacco: Never   Substance and Sexual Activity    Alcohol use: Not Currently    Drug use: Never     Social Determinants of Health     Financial Resource Strain: Low Risk  (5/8/2024)    Overall Financial Resource Strain (CARDIA)     Difficulty of Paying Living Expenses: Not hard at all   Food Insecurity: No Food Insecurity (5/8/2024)    Hunger Vital Sign     Worried About Running Out of Food in the Last Year: Never true     Ran Out of Food in the Last Year: Never true   Transportation Needs: No Transportation Needs (5/8/2024)    PRAPARE - Transportation     Lack of Transportation (Medical): No     Lack of Transportation (Non-Medical): No   Physical Activity: Inactive (5/8/2024)    Exercise Vital Sign     Days of Exercise per Week: 0 days     Minutes of Exercise per Session: 0 min   Stress: No Stress Concern Present (5/8/2024)    [place] [employer] [place] [employer] - Occupational Stress Questionnaire     Feeling of Stress : Not at all   Housing Stability: Unknown (5/8/2024)    Housing Stability Vital Sign     Unable to Pay for Housing in the Last Year: No       MEDICATIONS:   Current Outpatient Medications:     amLODIPine (NORVASC) 5 MG tablet, Take 1 tablet (5 mg total) by mouth once daily., Disp: 90 tablet, Rfl: 3    gabapentin (NEURONTIN) 100 MG capsule, Take 1 capsule (100 mg total) by mouth 3 (three) times daily., Disp: 90 capsule, Rfl: 11     meloxicam (MOBIC) 15 MG tablet, Take 1 tablet (15 mg total) by mouth once daily., Disp: 30 tablet, Rfl: 3    diazePAM (VALIUM) 2 MG tablet, Take 1 tablet (2 mg total) by mouth On call Procedure., Disp: 2 tablet, Rfl: 0    naproxen (NAPROSYN) 500 MG tablet, TAKE 1 TABLET (500 MG TOTAL) BY MOUTH 2 (TWO) TIMES DAILY AS NEEDED (PAIN). TAKE WITH MEAL, Disp: 60 tablet, Rfl: 3  ALLERGIES: Review of patient's allergies indicates:  No Known Allergies    VITAL SIGNS: Wt 97 kg (213 lb 13.5 oz)   BMI 43.19 kg/m²                                                                               PHYSICAL EXAMINATION:     Incision sites healed well  No evidence of any erythema, infection or induration  elbow Range of motion full   No effusion  2+ Radial pulses  No swelling         ROM:      Forward Elevation: Active 90 Passive 130   ER: 50  IR: buttock    Strength  Scaption at 0 and 30: 4+/5- *with Pain  ER: 5/5                                                                          ASSESSMENT:                                                                                                                                               1. Status post above, doing well.                                                                                                                               PLAN:                                                                                                                                                     1. Continue PT, case discussed with therapist.   2.  Follow up in 4 months for 1 year appointment  3. Spine referral placed, patient to have ROSIO of Cervical spine in near future      PROCEDURE NOTE:   After cortisone consent and post-injection information was given, the shoulder was prepped with betadyne and alcohol. The right subacromial space of the shoulder was injected with 2 cc 40 mg kenalog and 4 cc lidocaine and 4 cc .5% marcaine.   Bandaid was applied. Patient was reminded to rest with  RICE for 48 hours post injection and to call clinic immediately for any adverse side effects as explained in clinic today.

## 2024-06-10 NOTE — PROGRESS NOTES
Physical Therapy Daily Treatment Note     Name: Holly HEREDIA [facility] Number: [id number]  Therapy Diagnosis:        Encounter Diagnoses   Name Primary?    S/P right rotator cuff repair      Limited range of motion (ROM) of shoulder          Physician: Yessi Hahn MD     Physician Orders: PT Eval and Treat   Medical Diagnosis from Referral: M75.101 (ICD-10-CM) - Nontraumatic rotator cuff tear, right  Evaluation Date: 10/19/2023  Authorization Period Expiration: 7/9/2024  New Plan of Care Expiration: 8/6/2024  Visit # / Visits authorized: 11/12  Total visits 37  FOTO: 1/1     Precautions: Standard      Time In: 0700  Time Out: 0755  Total Appointment Time (timed & untimed codes): 55 minutes     POSTOPERATIVE PLAN: We will follow the arthroscopic rotator cuff repair guidelines for a small size rotator cuff tear.  We discussed with the patient's family after surgery.  The patient will remain in a sling for 6 weeks.  PT to start at 1-2 weeks.     Cuff specific program:  Pendulum exercises and Codman's exercises in 5-7 days, protecting rotator cuff repair for 1 week by avoiding active motion program until 1 week.     PASSIVE ROM: ER side 30 degrees, Forward Flex 90 degrees, ABD - 60 degrees   Full AAROM/PROM starting at 5-7 days as tolerated         Quality of tissue: fair      Quality of the repair: good       Size of tear: 10 x 15 mm, 60% partial thickness bursal     Subjective     Pt reports Went to the circus this weekend. 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 on arm rest      Holly received therapeutic exercises to develop strength, endurance, ROM, flexibility, posture, and core stabilization for 0 minutes  "including:      Holly received the following manual therapy techniques: Joint mobilizations, Manual traction, Myofacial release, Manual Lymphatic Drainage, Soft tissue Mobilization, and Friction Massage were applied to the: R shld  for 0  minutes, including:  Scapular mobs, oscillation, R UT stretch,   Posterior capsular stretch, MET shld horizontal abd        Holly participated in neuromuscular re-education activities to improve: Balance, Coordination, Kinesthetic, Sense, Proprioception, and Posture for 45 minutes. The following activities were included:    Sidelying flexion 0# 3 x 12  Sidelying ER 0# 3 X 12  No money OTB 30x 3" with MH  Prone extensions 0# 3 x 15   Prone row 0# 3 x 15  Sidelying ER 30 0#    Not   Supine ER OTB 3 x 15  Seated flexion wand 3# 30x  Sidelying shld abd 2x15 #1   Sidelying fleixon 2 x 15 1#  Pulley 5 shld flexion ROM   CC shld flexion 30x eccentric control  CC shld scap 30x eccentric control   Slot machines 2# 3x10  OH OTB pulsing shld flexion 5x/30"       Holly participated in dynamic functional therapeutic activities to improve functional performance for 15  minutes, including:    UBE 5'/5' level 6 for scap endurance and motion   Towel slide up wall 30x    NT  Wand 2# flexion   Scaptions 1# 3 x 12  Serratus wall slide YTB 30x  Row GTB 30x   Scap extension OTB 30x  Wall slides 3x10/3"  Standing wall c/cc [facility] simulating turning wheel 3x30 ea   Rows GTB with ER 3 x 10  IR/ER OTB conc/ecc 3x10    MH 10' to neck with no money exercise GTB     Home Exercises Provided and Patient Education Provided     Education provided:   - Compliance with HEP     Written Home Exercises Provided: yes.  Exercises were reviewed and Holly was able to demonstrate them prior to the end of the session.  Holly demonstrated good  understanding of the education provided.       Assessment     Spent today with no weight performing exercises, still appears to be self limiting herself with ROM. MRI results in " chart. To see MD after this visit, only 1 more visit approved through worker's comp    Holly Is progressing well towards her goals.   Pt prognosis is Good.     Pt will continue to benefit from skilled outpatient physical therapy to address the deficits listed in the problem list box on initial evaluation, provide pt/family education and to maximize pt's level of independence in the home and community environment.     Pt's spiritual, cultural and educational needs considered and pt agreeable to plan of care and goals.    Anticipated barriers to physical therapy: none     Goals: GOALS: Short Term Goals:  6 weeks (Updated 6/3/2024)  1.Report decreased shoulder pain < / =  2/10  to increase tolerance for return to ADLs out the sling(Progressing, not met)  2. Increase PROM 120 flexion and 25 ER to restore a more functional ROM (met)  3. Increased strength by 1/3 MMT grade in R shoulder scap stabilizers to increase tolerance for ADL and work activities.(met)  4. 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 Pt will have improved gcode of CJ (20-40% limited) on FOTO shoulder in order to demonstrate true functional improvement. (Progressing, not met)    Plan     Certification Period: 5/6/2024 to 8/6/2024  Recommended Treatment Plan: 2 times per week for 12 weeks: Manual Therapy, Moist Heat/ Ice, Neuromuscular Re-ed, Patient Education, Self Care, Therapeutic Activities, and Therapeutic Exercise  Other Recommendations: modalities prn, ASTYM prn, Dry Needling prn    Therapist: Nasir  Sarthak, PT, DPT

## 2024-06-10 NOTE — LETTER
Patient: Holly Salcido [place]   YOB: 1970   Clinic Number: [id number]   Today's Date: Kari 10, 2024        Certificate to Return to Work     Holly Salcido was seen by Yessi Hahn MD on 6/10/2024.    Holly will be out of work for the next 4 months.      Holly's anticipated return to work date is 10/15/2024.     Specific restrictions: Holly will be out of work until 10/14/2024. She has a follow-up scheduled with Dr. Hahn on 10/14/2024. At this appointment she will be reevaluated and her anticipated return to work date will be updated if needed.       If you have any questions or concerns, please feel free to contact the office at [phone].     Thank you.     Yessi Hahn MD        Signature:

## 2024-06-17 ENCOUNTER — CLINICAL SUPPORT (OUTPATIENT)
Dept: REHABILITATION | Facility: HOSPITAL | Age: 54
End: 2024-06-17
Payer: OTHER MISCELLANEOUS

## 2024-06-17 DIAGNOSIS — M25.619 LIMITED RANGE OF MOTION (ROM) OF SHOULDER: ICD-10-CM

## 2024-06-17 DIAGNOSIS — M25.511 ACUTE PAIN OF RIGHT SHOULDER: Primary | ICD-10-CM

## 2024-06-17 PROCEDURE — 97112 NEUROMUSCULAR REEDUCATION: CPT | Performed by: PHYSICAL THERAPIST

## 2024-06-17 NOTE — PROGRESS NOTES
Physical Therapy Daily Treatment Note     Name: Holly HEREDIA [facility] Number: [id number]  Therapy Diagnosis:        Encounter Diagnoses   Name Primary?    S/P right rotator cuff repair      Limited range of motion (ROM) of shoulder          Physician: Yessi Hahn MD     Physician Orders: PT Eval and Treat   Medical Diagnosis from Referral: M75.101 (ICD-10-CM) - Nontraumatic rotator cuff tear, right  Evaluation Date: 10/19/2023  Authorization Period Expiration: 7/9/2024  New Plan of Care Expiration: 8/6/2024  Visit # / Visits authorized: 12/12  Total visits 37  FOTO: 1/1     Precautions: Standard      Time In: 0700  Time Out: 0755  Total Appointment Time (timed & untimed codes): 45 minutes     POSTOPERATIVE PLAN: We will follow the arthroscopic rotator cuff repair guidelines for a small size rotator cuff tear.  We discussed with the patient's family after surgery.  The patient will remain in a sling for 6 weeks.  PT to start at 1-2 weeks.     Cuff specific program:  Pendulum exercises and Codman's exercises in 5-7 days, protecting rotator cuff repair for 1 week by avoiding active motion program until 1 week.     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 and rest RUE on arm rest      Holly received therapeutic exercises to develop strength, endurance, ROM, flexibility, posture, and core stabilization for 0 minutes including:      Holly received  "the following manual therapy techniques: Joint mobilizations, Manual traction, Myofacial release, Manual Lymphatic Drainage, Soft tissue Mobilization, and Friction Massage were applied to the: R shld  for 0  minutes, including:    Scapular mobs, oscillation, R UT stretch,   Posterior capsular stretch, MET shld horizontal abd        Holly participated in neuromuscular re-education activities to improve: Balance, Coordination, Kinesthetic, Sense, Proprioception, and Posture for 45 minutes. The following activities were included:    3# wand press 30x  No money GTB 30x  Sidelying shld abd 2x15 #1   Sidelying fleixon 2 x 15 1#  Sidelying ER 0# 3 X 12    Not   Sidelying flexion 0# 3 x 12  No money OTB 30x 3" with MH  Prone extensions 0# 3 x 15   Prone row 0# 3 x 15  Sidelying ER 30 0#  Supine ER OTB 3 x 15  Seated flexion wand 3# 30x  Pulley 5 shld flexion ROM   CC shld flexion 30x eccentric control  CC shld scap 30x eccentric control   Slot machines 2# 3x10  OH OTB pulsing shld flexion 5x/30"       Holly participated in dynamic functional therapeutic activities to improve functional performance for 15  minutes, including:    UBE 5'/5' level 6 for scap endurance and motion   Towel slide up wall 30x    NT  Wand 2# flexion   Scaptions 1# 3 x 12  Serratus wall slide YTB 30x  Row GTB 30x   Scap extension OTB 30x  Wall slides 3x10/3"  Standing wall c/cc [place] simulating turning wheel 3x30 ea   Rows GTB with ER 3 x 10  IR/ER OTB conc/ecc 3x10    MH 0' to neck with no money exercise GTB     Home Exercises Provided and Patient Education Provided     Education provided:   - Compliance with HEP     Written Home Exercises Provided: yes.  Exercises were reviewed and Holly was able to demonstrate them prior to the end of the session.  Holly demonstrated good  understanding of the education provided.       Assessment     Progressing with strengthening but still limited to shoulder height. Increased difficulty on the UBE today " without trouble. Wall slide to 120 degrees flexion BUE.     Holly Is progressing well towards her goals.   Pt prognosis is Good.     Pt will continue to benefit from skilled outpatient physical therapy to address the deficits listed in the problem list box on initial evaluation, provide pt/family education and to maximize pt's level of independence in the home and community environment.     Pt's spiritual, cultural and educational needs considered and pt agreeable to plan of care and goals.    Anticipated barriers to physical therapy: none     Goals: GOALS: Short Term Goals:  6 weeks (Updated 6/3/2024)  1.Report decreased shoulder pain < / =  2/10  to increase tolerance for return to ADLs out the sling(Progressing, not met)  2. Increase PROM 120 flexion and 25 ER to restore a more functional ROM (met)  3. Increased strength by 1/3 MMT grade in R shoulder scap stabilizers to increase tolerance for ADL and work activities.(met)  4. 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 Pt will have improved gcode of CJ (20-40% limited) on FOTO shoulder in order to demonstrate true functional improvement. (Progressing, not met)    Plan     Certification Period: 5/6/2024 to 8/6/2024  Recommended Treatment Plan: 2 times per week for 12 weeks: Manual Therapy, Moist Heat/ Ice, Neuromuscular Re-ed, Patient Education, Self Care, Therapeutic Activities, and Therapeutic Exercise  Other Recommendations: modalities prn, ASTYM prn, Dry Needling prn    Therapist: Nasir De León, PT, DPT

## 2024-10-14 ENCOUNTER — OFFICE VISIT (OUTPATIENT)
Dept: SPORTS MEDICINE | Facility: CLINIC | Age: 54
End: 2024-10-14
Payer: OTHER MISCELLANEOUS

## 2024-10-14 VITALS
WEIGHT: 200 LBS | HEART RATE: 111 BPM | SYSTOLIC BLOOD PRESSURE: 155 MMHG | BODY MASS INDEX: 40.32 KG/M2 | HEIGHT: 59 IN | DIASTOLIC BLOOD PRESSURE: 89 MMHG

## 2024-10-14 DIAGNOSIS — M75.01 ADHESIVE CAPSULITIS OF RIGHT SHOULDER: Primary | ICD-10-CM

## 2024-10-14 PROCEDURE — 99214 OFFICE O/P EST MOD 30 MIN: CPT | Mod: S$GLB,,, | Performed by: ORTHOPAEDIC SURGERY

## 2024-10-14 PROCEDURE — 99999 PR PBB SHADOW E&M-EST. PATIENT-LVL III: CPT | Mod: PBBFAC,,, | Performed by: ORTHOPAEDIC SURGERY

## 2024-10-14 NOTE — PROGRESS NOTES
Chief Complaint: right shoulder follow up    HISTORY OF PRESENT ILLNESS:   Pt is here today for 12 month follow up of shoulder arthroscopy.   We have reviewed patient's findings and discussed plan of care and future treatment options.       Patient has d/c attending physical therapy at the [facility] location, due to insurance denial    She was seen and evaluated by the spine team and an Cervical MRI was obtained. Per the spine's team plan she was to have a ROSIO injection targeting the right C6-7, but was denied by work comp.    She has d/c Mobic 15mg due to GI irritation, and denies previous relief from a medrol dose pack. Taking OTC advil with minimal relief    Work Comp, she reports she has not returned to work yet and feels like she can't until she is completely pain free.     SANE 40/100  VAS 8/10      DATE OF PROCEDURE: 10/03/2023  right  1. Shoulder arthroscopic rotator cuff repair (CPT 19203) with CuffMend ()  2. Shoulder arthroscopic biceps tenodesis (CPT 17030)  3. Shoulder arthroscopic subacromial decompression, bursectomy   4. Shoulder arthroscopic extensive debridement (anterior, posterior glenohumeral joint, subacromial space) (CPT 74423)  5. Shoulder arthroscopic labral debridement (CPT 78829)  6. Shoulder arthroscopic lysis of adhesions (CPT 47560)  7. Shoulder arthroscopic distal clavicle excision (CPT 01422)     Size of tear: 10 x 15 mm, 60% partial thickness bursal          Review of Systems   Constitution: Negative. Negative for chills, fever and night sweats.   HENT: Negative for congestion and headaches.    Eyes: Negative for blurred vision, left vision loss and right vision loss.   Cardiovascular: Negative for chest pain and syncope.   Respiratory: Negative for cough and shortness of breath.    Endocrine: Negative for polydipsia, polyphagia and polyuria.   Hematologic/Lymphatic: Negative for bleeding problem. Does not bruise/bleed easily.   Skin: Negative for dry skin, itching and  rash.   Musculoskeletal: Negative for falls and muscle weakness.   Gastrointestinal: Negative for abdominal pain and bowel incontinence.   Genitourinary: Negative for bladder incontinence and nocturia.   Neurological: Negative for disturbances in coordination, loss of balance and seizures.   Psychiatric/Behavioral: Negative for depression. The patient does not have insomnia.    Allergic/Immunologic: Negative for hives and persistent infections.       PAST MEDICAL HISTORY:   Past Medical History:   Diagnosis Date    Essential (primary) hypertension      PAST SURGICAL HISTORY:   Past Surgical History:   Procedure Laterality Date    ARTHROSCOPIC DEBRIDEMENT OF SHOULDER Right 10/3/2023    Procedure: EXTENSIVE DEBRIDEMENT, SHOULDER, ARTHROSCOPIC;  Surgeon: Yessi Hahn MD;  Location: [facility] OR;  Service: Orthopedics;  Laterality: Right;    ARTHROSCOPIC REPAIR OF ROTATOR CUFF OF SHOULDER Right 10/3/2023    Procedure: REPAIR, ROTATOR CUFF, ARTHROSCOPIC WITH CUFF MEND;  Surgeon: Yessi Hahn MD;  Location: [facility] OR;  Service: Orthopedics;  Laterality: Right;    ARTHROSCOPY OF SHOULDER WITH DECOMPRESSION OF SUBACROMIAL SPACE Right 10/3/2023    Procedure: ARTHROSCOPY, SHOULDER, WITH SUBACROMIAL  DECOMPRESSION/ BURSECTOMY;  Surgeon: Yessi Hahn MD;  Location: [facility] OR;  Service: Orthopedics;  Laterality: Right;    ARTHROSCOPY,SHOULDER,WITH BICEPS TENODESIS Right 10/3/2023    Procedure: ARTHROSCOPY,SHOULDER,WITH BICEPS TENODESIS;  Surgeon: Yessi Hahn MD;  Location: [facility] OR;  Service: Orthopedics;  Laterality: Right;     SECTION  2001    DISTAL CLAVICLE EXCISION Right 10/3/2023    Procedure: EXCISION, CLAVICLE, DISTAL;  Surgeon: Yessi Hahn MD;  Location: [facility] OR;  Service: Orthopedics;  Laterality: Right;    LYSIS, ADHESIONS, SHOULDER, ARTHROSCOPIC Right 10/3/2023    Procedure: LYSIS, ADHESIONS, SHOULDER, ARTHROSCOPIC;  Surgeon: Yessi Hahn MD;  Location: [facility] OR;  Service: Orthopedics;  Laterality: Right;     SHOULDER ARTHROSCOPY Right 10/3/2023    Procedure: ARTHROSCOPY, SHOULDER WITH L;ABRAL DEBRIDEMENT;  Surgeon: Yessi Hahn MD;  Location: [facility];  Service: Orthopedics;  Laterality: Right;     FAMILY HISTORY: No family history on file.  SOCIAL HISTORY:   Social History     Socioeconomic History    Marital status: Single   Tobacco Use    Smoking status: Never    Smokeless tobacco: Never   Substance and Sexual Activity    Alcohol use: Not Currently    Drug use: Never     Social Drivers of Health     Financial Resource Strain: Low Risk  (5/8/2024)    Overall Financial Resource Strain (CARDIA)     Difficulty of Paying Living Expenses: Not hard at all   Food Insecurity: No Food Insecurity (5/8/2024)    Hunger Vital Sign     Worried About Running Out of Food in the Last Year: Never true     Ran Out of Food in the Last Year: Never true   Transportation Needs: No Transportation Needs (5/8/2024)    PRAPARE - Transportation     Lack of Transportation (Medical): No     Lack of Transportation (Non-Medical): No   Physical Activity: Inactive (5/8/2024)    Exercise Vital Sign     Days of Exercise per Week: 0 days     Minutes of Exercise per Session: 0 min   Stress: No Stress Concern Present (5/8/2024)    [employer] - Occupational Stress Questionnaire     Feeling of Stress : Not at all   Housing Stability: Unknown (5/8/2024)    Housing Stability Vital Sign     Unable to Pay for Housing in the Last Year: No       MEDICATIONS:   Current Outpatient Medications:     amLODIPine (NORVASC) 5 MG tablet, Take 1 tablet (5 mg total) by mouth once daily., Disp: 90 tablet, Rfl: 3    diazePAM (VALIUM) 2 MG tablet, Take 1 tablet (2 mg total) by mouth On call Procedure., Disp: 2 tablet, Rfl: 0    gabapentin (NEURONTIN) 100 MG capsule, Take 1 capsule (100 mg total) by mouth 3 (three) times daily., Disp: 90 capsule, Rfl: 11    meloxicam (MOBIC) 15 MG tablet, Take 1 tablet (15 mg total) by mouth once daily., Disp: 30  "tablet, Rfl: 3    naproxen (NAPROSYN) 500 MG tablet, TAKE 1 TABLET (500 MG TOTAL) BY MOUTH 2 (TWO) TIMES DAILY AS NEEDED (PAIN). TAKE WITH MEAL, Disp: 60 tablet, Rfl: 3  ALLERGIES: Review of patient's allergies indicates:  No Known Allergies    VITAL SIGNS: BP (!) 155/89   Pulse (!) 111   Ht 4' 11" (1.499 m)   Wt 90.7 kg (200 lb)   BMI 40.40 kg/m²                                                                               PHYSICAL EXAMINATION:     Incision sites healed well  No evidence of any erythema, infection or induration  elbow Range of motion full   No effusion  2+ Radial pulses  No swelling         ROM:      Forward Elevation: Active 90 Passive 130   ER: 10  IR: L4    Strength  Scaption at 0 and 30: 5/5 *with Pain  ER: 5/5                                                                          ASSESSMENT:                                                                                                                                               1. Status post above, PT denied by Work Comp.                                                                                                                               PLAN:                                                                                                                                                     1. Resume PT, case discussed with therapist. PT ordered today. IS MEDICALLY NECESSARY TO CONTINUE PT FOR RANGE OF MOTION AND STRENGTHENING AND REHABILITATION.   Continued PT is MEDICALLY NECESSARY for her rehabilitation after her right shoulder surgery, probable duration will be 4-6 months from now.  Any denial of this continued physical therapy would constitute a significant insult to Mrs. Julian's recovery and certainly not within the standard of care.     2.  Follow up in 3 months     3. Also spine care was denied by Work Comp.                                                                                                         "

## 2024-10-31 ENCOUNTER — CLINICAL SUPPORT (OUTPATIENT)
Dept: REHABILITATION | Facility: HOSPITAL | Age: 54
End: 2024-10-31
Attending: ORTHOPAEDIC SURGERY
Payer: OTHER MISCELLANEOUS

## 2024-10-31 DIAGNOSIS — M25.511 RIGHT ANTERIOR SHOULDER PAIN: Primary | ICD-10-CM

## 2024-10-31 DIAGNOSIS — M75.01 ADHESIVE CAPSULITIS OF RIGHT SHOULDER: ICD-10-CM

## 2024-10-31 PROCEDURE — 97140 MANUAL THERAPY 1/> REGIONS: CPT | Performed by: PHYSICAL THERAPIST

## 2024-10-31 PROCEDURE — 97161 PT EVAL LOW COMPLEX 20 MIN: CPT | Performed by: PHYSICAL THERAPIST

## 2024-10-31 PROCEDURE — 97112 NEUROMUSCULAR REEDUCATION: CPT | Performed by: PHYSICAL THERAPIST

## 2024-11-01 NOTE — PROGRESS NOTES
Physical Therapy Daily Treatment Note     Name: Holly HEREDIA [facility] Number: [id number]  Therapy Diagnosis:        Encounter Diagnoses   Name Primary?    Adhesive capsulitis of right shoulder      Right anterior shoulder pain Yes        Physician: Yessi Hahn MD     Physician Orders: PT Eval and Treat   Medical Diagnosis from Referral:   Diagnosis   M75.01 (ICD-10-CM) - Adhesive capsulitis of right shoulder      Evaluation Date: 10/31/2024  Authorization Period Expiration: 1/22/2025  Plan of Care Expiration: 2/28/2025  Progress Note Due: 1/31/2025  Visit # / Visits authorized: 1/ 12   FOTO: 1/3     Precautions: Standard      Time 0810  Time Out: 0900  Total Appointment Time (timed & untimed codes): 60 minutes     Subjective     Pt reports: feel terrible   She was compliant with home exercise program.  Response to previous treatment:   Functional change:     Pain: 9/10  Location: right shoulder      Objective     Holly received therapeutic exercises to develop strength, endurance, ROM, flexibility, posture, and core stabilization for  minutes including:      Holly received the following manual therapy techniques: Joint mobilizations, Manual traction, and Soft tissue Mobilization were applied to the: R shld  for 10  minutes, including:  Scapular mobs   Inferior mob   Flexion 0-120 deg  ER arm by side  Oscillations     Holly participated in neuromuscular re-education activities to improve: Balance, Coordination, Kinesthetic, Sense, Proprioception, and Posture for  30 minutes. The following activities were included:  Supine wand ER 2x20  Supine shld flexion wand 30x  SL shld flexion slot machine 2x20  Slot machine 30x    Holly participated in dynamic functional therapeutic activities to improve functional performance for 10  minutes, including:  Pulley shld flex 5'   Pulley shld abd 5'        Home Exercises Provided and Patient Education Provided     Education provided:   - compliance with HEP     Written Home  Exercises Provided: yes.  Exercises were reviewed and Holly was able to demonstrate them prior to the end of the session.  Holly demonstrated good  understanding of the education provided.       Assessment   Pt tolerating tx fair. Continued with PROM and AAROM along with strengthening with limited ROM. Increase pain during tx.  VC/TC for correcting form/technique. Continue to progress as tolerated.   Holly Is progressing well towards her goals.   Pt prognosis is Good.     Pt will continue to benefit from skilled outpatient physical therapy to address the deficits listed in the problem list box on initial evaluation, provide pt/family education and to maximize pt's level of independence in the home and community environment.     Pt's spiritual, cultural and educational needs considered and pt agreeable to plan of care and goals.    Anticipated barriers to physical therapy: none     Goals: GOALS: Short Term Goals:  6 weeks  1.Report decreased shoulder pain < / =  4/10  to increase tolerance for return to exercise  2. Increase PROM full motion without pain   3. Increased strength by 1/3 MMT grade in shoulder cuff to increase tolerance for ADL and work activities.  4. Pt to tolerate HEP to improve ROM and independence with ADL's     Long Term Goals: 18 weeks  1.Report decreased shoulder pain  < / =  0 /10  to increase tolerance for return to driving  2.Increase AROM to full motion without pain in the shoulder  3.Increase strength to >/= 4/5 in cuff and low trap to increase tolerance for ADL and work activities.  4. Pt goal: return to driving without pain   5. Pt will have improved gcode of CJ (20-40% limited) on FOTO shoulder in order to demonstrate true functional improvement.   Plan       Continue with POC     Judd Freitas, PTA, STS

## 2024-11-04 ENCOUNTER — CLINICAL SUPPORT (OUTPATIENT)
Dept: REHABILITATION | Facility: HOSPITAL | Age: 54
End: 2024-11-04
Payer: OTHER MISCELLANEOUS

## 2024-11-04 DIAGNOSIS — M25.511 RIGHT ANTERIOR SHOULDER PAIN: Primary | ICD-10-CM

## 2024-11-04 PROCEDURE — 97530 THERAPEUTIC ACTIVITIES: CPT | Mod: CQ

## 2024-11-04 PROCEDURE — 97112 NEUROMUSCULAR REEDUCATION: CPT | Mod: CQ

## 2024-11-04 PROCEDURE — 97140 MANUAL THERAPY 1/> REGIONS: CPT | Mod: CQ

## 2024-11-06 ENCOUNTER — CLINICAL SUPPORT (OUTPATIENT)
Dept: REHABILITATION | Facility: HOSPITAL | Age: 54
End: 2024-11-06
Payer: OTHER MISCELLANEOUS

## 2024-11-06 DIAGNOSIS — M25.511 RIGHT ANTERIOR SHOULDER PAIN: Primary | ICD-10-CM

## 2024-11-06 PROCEDURE — 97530 THERAPEUTIC ACTIVITIES: CPT | Mod: CQ

## 2024-11-06 PROCEDURE — 97140 MANUAL THERAPY 1/> REGIONS: CPT | Mod: CQ

## 2024-11-06 NOTE — PROGRESS NOTES
Physical Therapy Daily Treatment Note     Name: Holly HEREDIA [facility] Number: [id number]  Therapy Diagnosis:        Encounter Diagnoses   Name Primary?    Adhesive capsulitis of right shoulder      Right anterior shoulder pain Yes        Physician: Yessi Hahn MD     Physician Orders: PT Eval and Treat   Medical Diagnosis from Referral:   Diagnosis   M75.01 (ICD-10-CM) - Adhesive capsulitis of right shoulder      Evaluation Date: 10/31/2024  Authorization Period Expiration: 1/22/2025  Plan of Care Expiration: 2/28/2025  Progress Note Due: 1/31/2025  Visit # / Visits authorized: 1/ 12   FOTO: 1/3     Precautions: Standard      Time 0800  Time Out: 0900  Total Appointment Time (timed & untimed codes): 60 minutes     Subjective     Pt reports: it really hurt last night and this morning  She was compliant with home exercise program.  Response to previous treatment: pain   Functional change: no change     Pain: 8/10  Location: right shoulder      Objective     Holly received therapeutic exercises to develop strength, endurance, ROM, flexibility, posture, and core stabilization for  minutes including:      Holly received the following manual therapy techniques: Joint mobilizations, Manual traction, and Soft tissue Mobilization were applied to the: R shld  for 10  minutes, including:  Scapular mobs   Inferior mob   Flexion 0-120 deg  ER arm by side  Oscillations     Holly participated in neuromuscular re-education activities to improve: Balance, Coordination, Kinesthetic, Sense, Proprioception, and Posture for  0 minutes. The following activities were included:      Holly participated in dynamic functional therapeutic activities to improve functional performance for 40  minutes, including:  UBE 5'/5' push/pull   Pulley shld flex 5'   Pulley shld abd 5'  Supine wand ER 2x20  Supine shld flexion wand 30x  SL shld flexion slot machine 2x20  Slot machine 30x    RICE for 10'       Home Exercises Provided and Patient  Education Provided     Education provided:   - compliance with HEP     Written Home Exercises Provided: yes.  Exercises were reviewed and Holly was able to demonstrate them prior to the end of the session.  Holly demonstrated good  understanding of the education provided.       Assessment   Pt tolerating tx fair. Continued with PROM and AAROM along with strengthening with limited ROM. Discomfort/ pain during tx with short rest at times.  VC/TC for correcting form/technique. Continue to progress as tolerated.   Holly Is progressing well towards her goals.   Pt prognosis is Good.     Pt will continue to benefit from skilled outpatient physical therapy to address the deficits listed in the problem list box on initial evaluation, provide pt/family education and to maximize pt's level of independence in the home and community environment.     Pt's spiritual, cultural and educational needs considered and pt agreeable to plan of care and goals.    Anticipated barriers to physical therapy: none     Goals: GOALS: Short Term Goals:  6 weeks  1.Report decreased shoulder pain < / =  4/10  to increase tolerance for return to exercise  2. Increase PROM full motion without pain   3. Increased strength by 1/3 MMT grade in shoulder cuff to increase tolerance for ADL and work activities.  4. Pt to tolerate HEP to improve ROM and independence with ADL's     Long Term Goals: 18 weeks  1.Report decreased shoulder pain  < / =  0 /10  to increase tolerance for return to driving  2.Increase AROM to full motion without pain in the shoulder  3.Increase strength to >/= 4/5 in cuff and low trap to increase tolerance for ADL and work activities.  4. Pt goal: return to driving without pain   5. Pt will have improved gcode of CJ (20-40% limited) on FOTO shoulder in order to demonstrate true functional improvement.   Plan     Continue with POC     Judd Freitas, PTA, STS

## 2024-11-13 ENCOUNTER — CLINICAL SUPPORT (OUTPATIENT)
Dept: REHABILITATION | Facility: HOSPITAL | Age: 54
End: 2024-11-13
Payer: OTHER MISCELLANEOUS

## 2024-11-13 DIAGNOSIS — M25.511 RIGHT ANTERIOR SHOULDER PAIN: Primary | ICD-10-CM

## 2024-11-13 PROCEDURE — 97140 MANUAL THERAPY 1/> REGIONS: CPT | Mod: CQ

## 2024-11-13 PROCEDURE — 97530 THERAPEUTIC ACTIVITIES: CPT | Mod: CQ

## 2024-11-13 NOTE — PROGRESS NOTES
Physical Therapy Daily Treatment Note     Name: Holly HEREDIA [facility] Number: [id number]  Therapy Diagnosis:        Encounter Diagnoses   Name Primary?    Adhesive capsulitis of right shoulder      Right anterior shoulder pain Yes        Physician: Yessi Hahn MD     Physician Orders: PT Eval and Treat   Medical Diagnosis from Referral:   Diagnosis   M75.01 (ICD-10-CM) - Adhesive capsulitis of right shoulder      Evaluation Date: 10/31/2024  Authorization Period Expiration: 1/22/2025  Plan of Care Expiration: 2/28/2025  Progress Note Due: 1/31/2025  Visit # / Visits authorized: 1/ 12   FOTO: 1/3     Precautions: Standard      Time 0800  Time Out: 0900  Total Appointment Time (timed & untimed codes): 60 minutes     Subjective     Pt reports: continues with no change in pain level. Stated f/u with Dr. Hahn and recommended CSI next visit if pain do not subside.    She was compliant with home exercise program.  Response to previous treatment: pain   Functional change: no change     Pain: 8/10  Location: right shoulder      Objective     Holly received therapeutic exercises to develop strength, endurance, ROM, flexibility, posture, and core stabilization for  minutes including:      Holly received the following manual therapy techniques: Joint mobilizations, Manual traction, and Soft tissue Mobilization were applied to the: R shld  for 10  minutes, including:  Scapular mobs   Inferior mob   Flexion 0-120 deg  ER arm by side  Oscillations     Holly participated in neuromuscular re-education activities to improve: Balance, Coordination, Kinesthetic, Sense, Proprioception, and Posture for  0 minutes. The following activities were included:      Holly participated in dynamic functional therapeutic activities to improve functional performance for 40  minutes, including:  UBE 5'/5' push/pull   LLLD infer mob prop MH + 5#  5'  Table step backs 30x   Pulley shld flex 5'   Pulley shld abd 5'  Supine wand ER  "2x20/3"  Supine shld flexion wand 30x  SL shld flexion slot machine 2x20  Slot machine 30x    RICE for 10'       Home Exercises Provided and Patient Education Provided     Education provided:   - compliance with HEP     Written Home Exercises Provided: yes.  Exercises were reviewed and Holly was able to demonstrate them prior to the end of the session.  Holly demonstrated good  understanding of the education provided.       Assessment   Pt tolerating tx fair. Limited ROM with increased pain. PROM and AAROM along with strengthening.  VC/TC for correcting form/technique. Continue to progress as tolerated.   Holly Is progressing well towards her goals.   Pt prognosis is Good.     Pt will continue to benefit from skilled outpatient physical therapy to address the deficits listed in the problem list box on initial evaluation, provide pt/family education and to maximize pt's level of independence in the home and community environment.     Pt's spiritual, cultural and educational needs considered and pt agreeable to plan of care and goals.    Anticipated barriers to physical therapy: none     Goals: GOALS: Short Term Goals:  6 weeks  1.Report decreased shoulder pain < / =  4/10  to increase tolerance for return to exercise  2. Increase PROM full motion without pain   3. Increased strength by 1/3 MMT grade in shoulder cuff to increase tolerance for ADL and work activities.  4. Pt to tolerate HEP to improve ROM and independence with ADL's     Long Term Goals: 18 weeks  1.Report decreased shoulder pain  < / =  0 /10  to increase tolerance for return to driving  2.Increase AROM to full motion without pain in the shoulder  3.Increase strength to >/= 4/5 in cuff and low trap to increase tolerance for ADL and work activities.  4. Pt goal: return to driving without pain   5. Pt will have improved gcode of CJ (20-40% limited) on FOTO shoulder in order to demonstrate true functional improvement.   Plan     Continue with POC "     Judd Freitas, PTA, STS

## 2024-11-15 ENCOUNTER — CLINICAL SUPPORT (OUTPATIENT)
Dept: REHABILITATION | Facility: HOSPITAL | Age: 54
End: 2024-11-15
Payer: OTHER MISCELLANEOUS

## 2024-11-15 DIAGNOSIS — M25.511 RIGHT ANTERIOR SHOULDER PAIN: Primary | ICD-10-CM

## 2024-11-15 PROCEDURE — 97140 MANUAL THERAPY 1/> REGIONS: CPT | Mod: CQ

## 2024-11-15 PROCEDURE — 97530 THERAPEUTIC ACTIVITIES: CPT | Mod: CQ

## 2024-11-15 NOTE — PROGRESS NOTES
"    Physical Therapy Daily Treatment Note     Name: Holly HEREDIA [facility] Number: [id number]  Therapy Diagnosis:        Encounter Diagnoses   Name Primary?    Adhesive capsulitis of right shoulder      Right anterior shoulder pain Yes        Physician: Yessi Hahn MD     Physician Orders: PT Eval and Treat   Medical Diagnosis from Referral:   Diagnosis   M75.01 (ICD-10-CM) - Adhesive capsulitis of right shoulder      Evaluation Date: 10/31/2024  Authorization Period Expiration: 1/22/2025  Plan of Care Expiration: 2/28/2025  Progress Note Due: 1/31/2025  Visit # / Visits authorized: 1/ 12   FOTO: 1/3     Precautions: Standard      Time 0655  Time Out: 0745  Total Appointment Time (timed & untimed codes): 50 minutes     Subjective     Pt reports: it still hurts   She was compliant with home exercise program.  Response to previous treatment: painful   Functional change: no change     Pain: 8/10  Location: right shoulder      Objective     Holly received therapeutic exercises to develop strength, endurance, ROM, flexibility, posture, and core stabilization for  minutes including:      Holly received the following manual therapy techniques: Joint mobilizations, Manual traction, and Soft tissue Mobilization were applied to the: R shld  for 10  minutes, including:  Scapular mobs   Inferior mob   Flexion 0-120 deg  ER arm by side  Oscillations     Holly participated in neuromuscular re-education activities to improve: Balance, Coordination, Kinesthetic, Sense, Proprioception, and Posture for  0 minutes. The following activities were included:      Holly participated in dynamic functional therapeutic activities to improve functional performance for 40  minutes, including:  UBE 5'/5' push/pull   LLLD infer mob prop MH + 5#  5'  Table step backs 30x   Pulley shld flex 5'   Pulley shld abd 5'  Supine wand ER 2x20/3"    Not today   Supine shld flexion wand 30x  SL shld flexion slot machine 2x20  Slot machine 30x   RICE for " 10'       Home Exercises Provided and Patient Education Provided     Education provided:   - compliance with HEP     Written Home Exercises Provided: yes.  Exercises were reviewed and Holly was able to demonstrate them prior to the end of the session.  Holly demonstrated good  understanding of the education provided.       Assessment   Pt tolerating tx fair. PROM and AAROM along with strengthening.  VC/TC for correcting form/technique. Continue to progress as tolerated. Patient needing to leave early.   Holly Is progressing well towards her goals.   Pt prognosis is Good.     Pt will continue to benefit from skilled outpatient physical therapy to address the deficits listed in the problem list box on initial evaluation, provide pt/family education and to maximize pt's level of independence in the home and community environment.     Pt's spiritual, cultural and educational needs considered and pt agreeable to plan of care and goals.    Anticipated barriers to physical therapy: none     Goals: GOALS: Short Term Goals:  6 weeks  1.Report decreased shoulder pain < / =  4/10  to increase tolerance for return to exercise  2. Increase PROM full motion without pain   3. Increased strength by 1/3 MMT grade in shoulder cuff to increase tolerance for ADL and work activities.  4. Pt to tolerate HEP to improve ROM and independence with ADL's     Long Term Goals: 18 weeks  1.Report decreased shoulder pain  < / =  0 /10  to increase tolerance for return to driving  2.Increase AROM to full motion without pain in the shoulder  3.Increase strength to >/= 4/5 in cuff and low trap to increase tolerance for ADL and work activities.  4. Pt goal: return to driving without pain   5. Pt will have improved gcode of CJ (20-40% limited) on FOTO shoulder in order to demonstrate true functional improvement.   Plan     Continue with POC     Judd Freitas, PTA, STS

## 2024-11-18 ENCOUNTER — CLINICAL SUPPORT (OUTPATIENT)
Dept: REHABILITATION | Facility: HOSPITAL | Age: 54
End: 2024-11-18
Payer: OTHER MISCELLANEOUS

## 2024-11-18 DIAGNOSIS — M25.511 RIGHT ANTERIOR SHOULDER PAIN: Primary | ICD-10-CM

## 2024-11-18 PROCEDURE — 97530 THERAPEUTIC ACTIVITIES: CPT | Performed by: PHYSICAL THERAPIST

## 2024-11-18 PROCEDURE — 97140 MANUAL THERAPY 1/> REGIONS: CPT | Performed by: PHYSICAL THERAPIST

## 2024-11-18 NOTE — PROGRESS NOTES
"Physical Therapy Daily Treatment Note     Name: Holly HEREDIA [facility] Number: [id number]  Therapy Diagnosis:        Encounter Diagnoses   Name Primary?    Adhesive capsulitis of right shoulder      Right anterior shoulder pain Yes        Physician: Yessi Hahn MD     Physician Orders: PT Eval and Treat   Medical Diagnosis from Referral:   Diagnosis   M75.01 (ICD-10-CM) - Adhesive capsulitis of right shoulder      Evaluation Date: 10/31/2024  Authorization Period Expiration: 1/22/2025  Plan of Care Expiration: 2/28/2025  Progress Note Due: 1/31/2025  Visit # / Visits authorized: 6/ 12   FOTO: 1/3     Precautions: Standard      Time 800  Time Out: 910  Total Appointment Time (timed & untimed codes): 60 minutes     Subjective     Pt reports: no change in the shoulder, nice and relaxing weekend  She was compliant with home exercise program.  Response to previous treatment: painful   Functional change: no change     Pain: 8/10  Location: right shoulder      Objective     Holly received therapeutic exercises to develop strength, endurance, ROM, flexibility, posture, and core stabilization for  minutes including:      Holly received the following manual therapy techniques: Joint mobilizations, Manual traction, and Soft tissue Mobilization were applied to the: R shld  for 10  minutes, including:    Scapular mobs   Inferior mob   Flexion 0-120 deg  ER arm by side  Oscillations     Holly participated in neuromuscular re-education activities to improve: Balance, Coordination, Kinesthetic, Sense, Proprioception, and Posture for  0 minutes. The following activities were included:      Holly participated in dynamic functional therapeutic activities to improve functional performance for 45  minutes, including:    UBE 5'/5' push/pull level 5  Supine wand ER 2x20/3"  SL shld flexion slot machine 2x20  Slot machine 30x   Serratus wall slide foam roll 20x  Incline flexion 2# wand 30x    Not today   LLLD infer mob prop MH + 5#  " 5'  Table step backs 30x   Pulley shld flex 5'   Pulley shld abd 5'  ER wand on table 30x  Supine shld flexion wand 30x    MH for 10' R shoulder end of session      Home Exercises Provided and Patient Education Provided     Education provided:   - compliance with HEP     Written Home Exercises Provided: yes.  Exercises were reviewed and Holly was able to demonstrate them prior to the end of the session.  Holly demonstrated good  understanding of the education provided.       Assessment     AAROM on the wall with foam roll to 120 deg. Noted some neck symptoms with side-lying dowel flexion. Improved PROM flexion and ER since last time I saw patient.     Holly Is progressing well towards her goals.   Pt prognosis is Good.     Pt will continue to benefit from skilled outpatient physical therapy to address the deficits listed in the problem list box on initial evaluation, provide pt/family education and to maximize pt's level of independence in the home and community environment.     Pt's spiritual, cultural and educational needs considered and pt agreeable to plan of care and goals.    Anticipated barriers to physical therapy: none     Goals: GOALS: Short Term Goals:  6 weeks  1.Report decreased shoulder pain < / =  4/10  to increase tolerance for return to exercise  2. Increase PROM full motion without pain   3. Increased strength by 1/3 MMT grade in shoulder cuff to increase tolerance for ADL and work activities.  4. Pt to tolerate HEP to improve ROM and independence with ADL's     Long Term Goals: 18 weeks  1.Report decreased shoulder pain  < / =  0 /10  to increase tolerance for return to driving  2.Increase AROM to full motion without pain in the shoulder  3.Increase strength to >/= 4/5 in cuff and low trap to increase tolerance for ADL and work activities.  4. Pt goal: return to driving without pain   5. Pt will have improved gcode of CJ (20-40% limited) on FOTO shoulder in order to demonstrate true functional  improvement.   Plan     Continue with POC     Nasir De León, PT, DPT

## 2024-11-20 ENCOUNTER — CLINICAL SUPPORT (OUTPATIENT)
Dept: REHABILITATION | Facility: HOSPITAL | Age: 54
End: 2024-11-20
Payer: OTHER MISCELLANEOUS

## 2024-11-20 DIAGNOSIS — M25.511 RIGHT ANTERIOR SHOULDER PAIN: Primary | ICD-10-CM

## 2024-11-20 PROCEDURE — 97140 MANUAL THERAPY 1/> REGIONS: CPT | Mod: CQ

## 2024-11-20 PROCEDURE — 97530 THERAPEUTIC ACTIVITIES: CPT | Mod: CQ

## 2024-11-20 NOTE — PROGRESS NOTES
Physical Therapy Daily Treatment Note     Name: Holly HEREDIA [facility] Number: [id number]  Therapy Diagnosis:        Encounter Diagnoses   Name Primary?    Adhesive capsulitis of right shoulder      Right anterior shoulder pain Yes        Physician: Yessi Hahn MD     Physician Orders: PT Eval and Treat   Medical Diagnosis from Referral:   Diagnosis   M75.01 (ICD-10-CM) - Adhesive capsulitis of right shoulder      Evaluation Date: 10/31/2024  Authorization Period Expiration: 1/22/2025  Plan of Care Expiration: 2/28/2025  Progress Note Due: 1/31/2025  Visit # / Visits authorized: 6/ 12   FOTO: 1/3     Precautions: Standard      Time 0810  Time Out: 0910  Total Appointment Time (timed & untimed codes): 60 minutes     Subjective     Pt reports: pain the same   She was compliant with home exercise program.  Response to previous treatment: painful   Functional change: no change     Pain: 8/10  Location: right shoulder      Objective     Holly received therapeutic exercises to develop strength, endurance, ROM, flexibility, posture, and core stabilization for  minutes including:      Holly received the following manual therapy techniques: Joint mobilizations, Manual traction, and Soft tissue Mobilization were applied to the: R shld  for 10  minutes, including:  Scapular mobs   Inferior mob   Flexion 0-120 deg  ER arm by side  Oscillations     Holly participated in neuromuscular re-education activities to improve: Balance, Coordination, Kinesthetic, Sense, Proprioception, and Posture for  0 minutes. The following activities were included:      Holly participated in dynamic functional therapeutic activities to improve functional performance for 40  minutes, including:  UBE 5'/5' push/pull level 5  Pulley shld flex 5'   Pulley shld abd 5'  Supine wand ER30x  SL shld flexion slot machine 2x20    Slot machine 30x   Serratus wall slide foam roll 20x  Incline flexion 2# wand 30x    Not today   LLLD infer mob prop MH + 5#   5'  Table step backs 30x   ER wand on table 30x  Supine shld flexion wand 30x    MH for 10' R shoulder end of session      Home Exercises Provided and Patient Education Provided     Education provided:   - compliance with HEP     Written Home Exercises Provided: yes.  Exercises were reviewed and Holly was able to demonstrate them prior to the end of the session.  Holly demonstrated good  understanding of the education provided.       Assessment   Pt tolerating tx fair; continue with PROM flexion and ER and gravity eliminated exercise. VC/TC for correcting form/technique. Progress as tolerated.        Holly Is progressing well towards her goals.   Pt prognosis is Good.     Pt will continue to benefit from skilled outpatient physical therapy to address the deficits listed in the problem list box on initial evaluation, provide pt/family education and to maximize pt's level of independence in the home and community environment.     Pt's spiritual, cultural and educational needs considered and pt agreeable to plan of care and goals.    Anticipated barriers to physical therapy: none     Goals: GOALS: Short Term Goals:  6 weeks  1.Report decreased shoulder pain < / =  4/10  to increase tolerance for return to exercise  2. Increase PROM full motion without pain   3. Increased strength by 1/3 MMT grade in shoulder cuff to increase tolerance for ADL and work activities.  4. Pt to tolerate HEP to improve ROM and independence with ADL's     Long Term Goals: 18 weeks  1.Report decreased shoulder pain  < / =  0 /10  to increase tolerance for return to driving  2.Increase AROM to full motion without pain in the shoulder  3.Increase strength to >/= 4/5 in cuff and low trap to increase tolerance for ADL and work activities.  4. Pt goal: return to driving without pain   5. Pt will have improved gcode of CJ (20-40% limited) on FOTO shoulder in order to demonstrate true functional improvement.   Plan     Continue with ZAY Whaley  P Fortino, PTA, STS

## 2024-11-25 ENCOUNTER — CLINICAL SUPPORT (OUTPATIENT)
Dept: REHABILITATION | Facility: HOSPITAL | Age: 54
End: 2024-11-25
Payer: OTHER MISCELLANEOUS

## 2024-11-25 DIAGNOSIS — M25.511 RIGHT ANTERIOR SHOULDER PAIN: Primary | ICD-10-CM

## 2024-11-25 PROCEDURE — 97140 MANUAL THERAPY 1/> REGIONS: CPT | Mod: CQ

## 2024-11-25 PROCEDURE — 97530 THERAPEUTIC ACTIVITIES: CPT | Mod: CQ

## 2024-11-25 NOTE — PROGRESS NOTES
"Physical Therapy Daily Treatment Note     Name: Holly HEREDIA [facility] Number: [id number]  Therapy Diagnosis:        Encounter Diagnoses   Name Primary?    Adhesive capsulitis of right shoulder      Right anterior shoulder pain Yes        Physician: Yessi Hahn MD     Physician Orders: PT Eval and Treat   Medical Diagnosis from Referral:   Diagnosis   M75.01 (ICD-10-CM) - Adhesive capsulitis of right shoulder      Evaluation Date: 10/31/2024  Authorization Period Expiration: 1/22/2025  Plan of Care Expiration: 2/28/2025  Progress Note Due: 1/31/2025  Visit # / Visits authorized: 6/ 12   FOTO: 1/3     Precautions: Standard      Time 0800  Time Out: 0900  Total Appointment Time (timed & untimed codes): 60 minutes     Subjective     Pt reports: pain the same   She was compliant with home exercise program.  Response to previous treatment: painful   Functional change: no change     Pain: 8/10  Location: right shoulder      Objective     Holly received therapeutic exercises to develop strength, endurance, ROM, flexibility, posture, and core stabilization for  minutes including:      Holly received the following manual therapy techniques: Joint mobilizations, Manual traction, and Soft tissue Mobilization were applied to the: R shld  for 10  minutes, including:  Scapular mobs   Inferior mob   Flexion 0-120 deg  ER arm by side  Oscillations     Holly participated in neuromuscular re-education activities to improve: Balance, Coordination, Kinesthetic, Sense, Proprioception, and Posture for  0 minutes. The following activities were included:      Holly participated in dynamic functional therapeutic activities to improve functional performance for 40  minutes, including:  UBE 5'/5' push/pull level 5  Pulley shld flex 5'   Pulley shld abd 5'  Supine wand ER 3x10/3"  Supine shld flex 3x10  SL shld flexion 3x10/3'    Slot machine 30x   Serratus wall slide foam roll 20x  Incline flexion 2# wand 30x    Not today   LLLD infer mob " prop MH + 5#  5'  Table step backs 30x   ER wand on table 30x  Supine shld flexion wand 30x    MH for 10' R shoulder end of session      Home Exercises Provided and Patient Education Provided     Education provided:   - compliance with HEP     Written Home Exercises Provided: yes.  Exercises were reviewed and Holly was able to demonstrate them prior to the end of the session.  Holly demonstrated good  understanding of the education provided.       Assessment   Pt tolerating tx fair; continue with PROM flexion and ER and gravity eliminated exercise with progression to AROM vs AAROM. VC/TC for correcting form/technique. Progress as tolerated.        Holly Is progressing well towards her goals.   Pt prognosis is Good.     Pt will continue to benefit from skilled outpatient physical therapy to address the deficits listed in the problem list box on initial evaluation, provide pt/family education and to maximize pt's level of independence in the home and community environment.     Pt's spiritual, cultural and educational needs considered and pt agreeable to plan of care and goals.    Anticipated barriers to physical therapy: none     Goals: GOALS: Short Term Goals:  6 weeks  1.Report decreased shoulder pain < / =  4/10  to increase tolerance for return to exercise  2. Increase PROM full motion without pain   3. Increased strength by 1/3 MMT grade in shoulder cuff to increase tolerance for ADL and work activities.  4. Pt to tolerate HEP to improve ROM and independence with ADL's     Long Term Goals: 18 weeks  1.Report decreased shoulder pain  < / =  0 /10  to increase tolerance for return to driving  2.Increase AROM to full motion without pain in the shoulder  3.Increase strength to >/= 4/5 in cuff and low trap to increase tolerance for ADL and work activities.  4. Pt goal: return to driving without pain   5. Pt will have improved gcode of CJ (20-40% limited) on FOTO shoulder in order to demonstrate true functional  improvement.   Plan     Continue with POC     Judd Freitas, PTA, STS

## 2024-12-02 ENCOUNTER — CLINICAL SUPPORT (OUTPATIENT)
Dept: REHABILITATION | Facility: HOSPITAL | Age: 54
End: 2024-12-02
Payer: OTHER MISCELLANEOUS

## 2024-12-02 DIAGNOSIS — M25.511 RIGHT ANTERIOR SHOULDER PAIN: Primary | ICD-10-CM

## 2024-12-02 PROCEDURE — 97530 THERAPEUTIC ACTIVITIES: CPT | Performed by: PHYSICAL THERAPIST

## 2024-12-02 PROCEDURE — 97140 MANUAL THERAPY 1/> REGIONS: CPT | Performed by: PHYSICAL THERAPIST

## 2024-12-02 NOTE — PROGRESS NOTES
"Physical Therapy Daily Treatment Note     Name: Holly HEREDIA [facility] Number: [id number]  Therapy Diagnosis:        Encounter Diagnoses   Name Primary?    Adhesive capsulitis of right shoulder      Right anterior shoulder pain Yes        Physician: Yessi Hahn MD     Physician Orders: PT Eval and Treat   Medical Diagnosis from Referral:   Diagnosis   M75.01 (ICD-10-CM) - Adhesive capsulitis of right shoulder      Evaluation Date: 10/31/2024  Authorization Period Expiration: 1/22/2025  Plan of Care Expiration: 2/28/2025  Progress Note Due: 1/31/2025  Visit # / Visits authorized: 9/ 12   FOTO: 2/3     Precautions: Standard      Time 0811  Time Out: 0900  Total Appointment Time (timed & untimed codes): 49 minutes     Subjective     Pt reports: overall doing better, waiting on the shot next month     She was compliant with home exercise program.  Response to previous treatment: painful   Functional change: no change     Pain: 8/10  Location: right shoulder      Objective     Holly received therapeutic exercises to develop strength, endurance, ROM, flexibility, posture, and core stabilization for  minutes including:      Holly received the following manual therapy techniques: Joint mobilizations, Manual traction, and Soft tissue Mobilization were applied to the: R shld  for 10  minutes, including:  Scapular mobs   Inferior mob   Flexion 0-120 deg  ER arm by side  Oscillations     Holly participated in neuromuscular re-education activities to improve: Balance, Coordination, Kinesthetic, Sense, Proprioception, and Posture for  0 minutes. The following activities were included:      Holly participated in dynamic functional therapeutic activities to improve functional performance for 39  minutes, including:    UBE 5'/5' push/pull level 5  Pulley shld flex 5'   Pulley shld abd 5'  Slot machine 2# 30x   No money yellow loop 30x 3"    Not today   Supine wand ER 3x10/3"  Supine shld flex 3x10  SL shld flexion " 3x10/3'  Serratus wall slide foam roll 20x  Incline flexion 2# wand 30x  LLLD infer mob prop MH + 5#  5'  Table step backs 30x   ER wand on table 30x  Supine shld flexion wand 30x    MH for 10' R shoulder end of session      Home Exercises Provided and Patient Education Provided     Education provided:   - compliance with HEP     Written Home Exercises Provided: yes.  Exercises were reviewed and Holly was able to demonstrate them prior to the end of the session.  Holly demonstrated good  understanding of the education provided.       Assessment   FOTO score improved to 26 today. Improved ROM seen within session, able to tolerate 2# on slot machine exercise. Will continue to progress with loading as tolerated.        Holly Is progressing well towards her goals.   Pt prognosis is Good.     Pt will continue to benefit from skilled outpatient physical therapy to address the deficits listed in the problem list box on initial evaluation, provide pt/family education and to maximize pt's level of independence in the home and community environment.     Pt's spiritual, cultural and educational needs considered and pt agreeable to plan of care and goals.    Anticipated barriers to physical therapy: none     Goals: GOALS: Short Term Goals:  6 weeks  1.Report decreased shoulder pain < / =  4/10  to increase tolerance for return to exercise  2. Increase PROM full motion without pain   3. Increased strength by 1/3 MMT grade in shoulder cuff to increase tolerance for ADL and work activities.  4. Pt to tolerate HEP to improve ROM and independence with ADL's     Long Term Goals: 18 weeks  1.Report decreased shoulder pain  < / =  0 /10  to increase tolerance for return to driving  2.Increase AROM to full motion without pain in the shoulder  3.Increase strength to >/= 4/5 in cuff and low trap to increase tolerance for ADL and work activities.  4. Pt goal: return to driving without pain   5. Pt will have improved gcode of CJ (20-40%  limited) on FOTO shoulder in order to demonstrate true functional improvement.   Plan     Continue with POC     Nasir De León, PT, DPT, DPT

## 2024-12-04 ENCOUNTER — CLINICAL SUPPORT (OUTPATIENT)
Dept: REHABILITATION | Facility: HOSPITAL | Age: 54
End: 2024-12-04
Payer: OTHER MISCELLANEOUS

## 2024-12-04 DIAGNOSIS — M25.511 RIGHT ANTERIOR SHOULDER PAIN: Primary | ICD-10-CM

## 2024-12-04 PROCEDURE — 97530 THERAPEUTIC ACTIVITIES: CPT | Mod: CQ

## 2024-12-04 PROCEDURE — 97112 NEUROMUSCULAR REEDUCATION: CPT | Mod: CQ

## 2024-12-04 PROCEDURE — 97140 MANUAL THERAPY 1/> REGIONS: CPT | Mod: CQ

## 2024-12-04 NOTE — PROGRESS NOTES
"Physical Therapy Daily Treatment Note     Name: Holly HEREDIA [facility] Number: [id number]  Therapy Diagnosis:        Encounter Diagnoses   Name Primary?    Adhesive capsulitis of right shoulder      Right anterior shoulder pain Yes        Physician: Yessi Hahn MD     Physician Orders: PT Eval and Treat   Medical Diagnosis from Referral:   Diagnosis   M75.01 (ICD-10-CM) - Adhesive capsulitis of right shoulder      Evaluation Date: 10/31/2024  Authorization Period Expiration: 1/22/2025  Plan of Care Expiration: 2/28/2025  Progress Note Due: 1/31/2025  Visit # / Visits authorized: 9/ 12   FOTO: 2/3     Precautions: Standard      Time 0805  Time Out: 0900  Total Appointment Time (timed & untimed codes): 55 minutes     Subjective     Pt reports: decreased pain but still hurting with tingling/throbbing pain at times   She was compliant with home exercise program.  Response to previous treatment: pain   Functional change: on going      Pain: 6/10  Location: right shoulder      Objective     Holly received therapeutic exercises to develop strength, endurance, ROM, flexibility, posture, and core stabilization for  minutes including:      Holly received the following manual therapy techniques: Joint mobilizations, Manual traction, and Soft tissue Mobilization were applied to the: R shld  for 10  minutes, including:  Scapular mobs   Inferior mob   Flexion 0-120 deg  ER arm by side  Oscillations     Holly participated in neuromuscular re-education activities to improve: Balance, Coordination, Kinesthetic, Sense, Proprioception, and Posture for  10 minutes. The following activities were included:  Prone R shd T's 3x10  Prone R shld Y's 3x10    Holly participated in dynamic functional therapeutic activities to improve functional performance for 35 minutes, including:  UBE 5'/5' push/pull level 5  Pulley shld flex 5'   Pulley shld abd 5'  Slot machine 2# 4x10  No money yellow loop 30x 3"    Not today   Supine wand ER " "3x10/3"  Supine shld flex 3x10  SL shld flexion 3x10/3'  Serratus wall slide foam roll 20x  Incline flexion 2# wand 30x  LLLD infer mob prop MH + 5#  5'  Table step backs 30x   ER wand on table 30x  Supine shld flexion wand 30x    MH for 10' R shoulder end of session      Home Exercises Provided and Patient Education Provided     Education provided:   - compliance with HEP     Written Home Exercises Provided: yes.  Exercises were reviewed and Holly was able to demonstrate them prior to the end of the session.  Holly demonstrated good  understanding of the education provided.       Assessment   Pt tolerating tx fair. Demonstrating improved PROM with decreased pain.  Improved ROM seen within session, but with min shld/neck discomfort. VC/TC for correcting form/technique  Will continue to progress with loading as tolerated.        Holly Is progressing well towards her goals.   Pt prognosis is Good.     Pt will continue to benefit from skilled outpatient physical therapy to address the deficits listed in the problem list box on initial evaluation, provide pt/family education and to maximize pt's level of independence in the home and community environment.     Pt's spiritual, cultural and educational needs considered and pt agreeable to plan of care and goals.    Anticipated barriers to physical therapy: none     Goals: GOALS: Short Term Goals:  6 weeks  1.Report decreased shoulder pain < / =  4/10  to increase tolerance for return to exercise  2. Increase PROM full motion without pain   3. Increased strength by 1/3 MMT grade in shoulder cuff to increase tolerance for ADL and work activities.  4. Pt to tolerate HEP to improve ROM and independence with ADL's     Long Term Goals: 18 weeks  1.Report decreased shoulder pain  < / =  0 /10  to increase tolerance for return to driving  2.Increase AROM to full motion without pain in the shoulder  3.Increase strength to >/= 4/5 in cuff and low trap to increase tolerance for " ADL and work activities.  4. Pt goal: return to driving without pain   5. Pt will have improved gcode of CJ (20-40% limited) on FOTO shoulder in order to demonstrate true functional improvement.   Plan     Continue with POC     Judd Freitas, PTA, STS

## 2024-12-09 ENCOUNTER — CLINICAL SUPPORT (OUTPATIENT)
Dept: REHABILITATION | Facility: HOSPITAL | Age: 54
End: 2024-12-09
Payer: OTHER MISCELLANEOUS

## 2024-12-09 DIAGNOSIS — M25.511 RIGHT ANTERIOR SHOULDER PAIN: Primary | ICD-10-CM

## 2024-12-09 PROCEDURE — 97530 THERAPEUTIC ACTIVITIES: CPT | Performed by: PHYSICAL THERAPIST

## 2024-12-09 PROCEDURE — 97140 MANUAL THERAPY 1/> REGIONS: CPT | Performed by: PHYSICAL THERAPIST

## 2024-12-09 NOTE — PROGRESS NOTES
Physical Therapy Daily Treatment Note     Name: Holly HEREDIA [facility] Number: [id number]  Therapy Diagnosis:        Encounter Diagnoses   Name Primary?    Adhesive capsulitis of right shoulder      Right anterior shoulder pain Yes        Physician: Yessi Hahn MD     Physician Orders: PT Eval and Treat   Medical Diagnosis from Referral:   Diagnosis   M75.01 (ICD-10-CM) - Adhesive capsulitis of right shoulder      Evaluation Date: 10/31/2024  Authorization Period Expiration: 1/22/2025  Plan of Care Expiration: 2/28/2025  Progress Note Due: 1/31/2025  Visit # / Visits authorized: 11/ 12   FOTO: 2/3     Precautions: Standard      Time 805  Time Out: 0900  Total Appointment Time (timed & untimed codes): 55 minutes     Subjective     Pt reports: Stayed home watching [employer] movies this weekend, very lazy and slow weekend.   She was compliant with home exercise program.  Response to previous treatment: pain   Functional change: on going      Pain: 6/10  Location: right shoulder      Objective     Holly received therapeutic exercises to develop strength, endurance, ROM, flexibility, posture, and core stabilization for  minutes including:      Holly received the following manual therapy techniques: Joint mobilizations, Manual traction, and Soft tissue Mobilization were applied to the: R shld  for 10  minutes, including:  Scapular mobs   Inferior mob   Flexion 0-120 deg  ER arm by side  Oscillations     Holly participated in neuromuscular re-education activities to improve: Balance, Coordination, Kinesthetic, Sense, Proprioception, and Posture for  0 minutes. The following activities were included:  Prone R shd T's 3x10  Prone R shld Y's 3x10    Holly participated in dynamic functional therapeutic activities to improve functional performance for 45 minutes, including:  UBE 5'/5' push/pull level 5  Pulley shld flex 5'   Pulley shld abd 5'  Sidelying ER 1# 3 x 15    Not today   Slot machine 2# 4x10  No money yellow loop 30x  "3"  Supine wand ER 3x10/3"  Supine shld flex 3x10  SL shld flexion 3x10/3'  Serratus wall slide foam roll 20x  Incline flexion 2# wand 30x  LLLD infer mob prop MH + 5#  5'  Table step backs 30x   ER wand on table 30x  Supine shld flexion wand 30x    MH for 10' R shoulder end of session      Home Exercises Provided and Patient Education Provided     Education provided:   - compliance with HEP     Written Home Exercises Provided: yes.  Exercises were reviewed and Holly was able to demonstrate them prior to the end of the session.  Holly demonstrated good  understanding of the education provided.       Assessment     Holly did well with exercising this morning. Tolerated progressed to 2# flexion and improved motion noted on UBE at higher level and full PROM on ganga     Holly Is progressing well towards her goals.   Pt prognosis is Good.     Pt will continue to benefit from skilled outpatient physical therapy to address the deficits listed in the problem list box on initial evaluation, provide pt/family education and to maximize pt's level of independence in the home and community environment.     Pt's spiritual, cultural and educational needs considered and pt agreeable to plan of care and goals.    Anticipated barriers to physical therapy: none     Goals: GOALS: Short Term Goals:  6 weeks  1.Report decreased shoulder pain < / =  4/10  to increase tolerance for return to exercise  2. Increase PROM full motion without pain   3. Increased strength by 1/3 MMT grade in shoulder cuff to increase tolerance for ADL and work activities.  4. Pt to tolerate HEP to improve ROM and independence with ADL's     Long Term Goals: 18 weeks  1.Report decreased shoulder pain  < / =  0 /10  to increase tolerance for return to driving  2.Increase AROM to full motion without pain in the shoulder  3.Increase strength to >/= 4/5 in cuff and low trap to increase tolerance for ADL and work activities.  4. Pt goal: return to driving " without pain   5. Pt will have improved gcode of CJ (20-40% limited) on FOTO shoulder in order to demonstrate true functional improvement.   Plan     Continue with POC     Nasir De León, PT, DPT,

## 2024-12-13 ENCOUNTER — CLINICAL SUPPORT (OUTPATIENT)
Dept: REHABILITATION | Facility: HOSPITAL | Age: 54
End: 2024-12-13
Payer: OTHER MISCELLANEOUS

## 2024-12-13 ENCOUNTER — OFFICE VISIT (OUTPATIENT)
Dept: SPORTS MEDICINE | Facility: CLINIC | Age: 54
End: 2024-12-13
Payer: OTHER MISCELLANEOUS

## 2024-12-13 VITALS
WEIGHT: 214.63 LBS | SYSTOLIC BLOOD PRESSURE: 126 MMHG | BODY MASS INDEX: 43.27 KG/M2 | HEART RATE: 89 BPM | DIASTOLIC BLOOD PRESSURE: 85 MMHG | HEIGHT: 59 IN

## 2024-12-13 DIAGNOSIS — M25.511 RIGHT ANTERIOR SHOULDER PAIN: Primary | ICD-10-CM

## 2024-12-13 DIAGNOSIS — Z98.890 S/P RIGHT ROTATOR CUFF REPAIR: Primary | ICD-10-CM

## 2024-12-13 PROCEDURE — 97140 MANUAL THERAPY 1/> REGIONS: CPT | Mod: CQ

## 2024-12-13 PROCEDURE — 97530 THERAPEUTIC ACTIVITIES: CPT | Mod: CQ

## 2024-12-13 PROCEDURE — 99999 PR PBB SHADOW E&M-EST. PATIENT-LVL III: CPT | Mod: PBBFAC,,, | Performed by: ORTHOPAEDIC SURGERY

## 2024-12-13 PROCEDURE — 97112 NEUROMUSCULAR REEDUCATION: CPT | Mod: CQ

## 2024-12-13 NOTE — PROGRESS NOTES
Chief Complaint: right shoulder follow up    HISTORY OF PRESENT ILLNESS:   Pt is here today for 12 month follow up of shoulder arthroscopy.   We have reviewed patient's findings and discussed plan of care and future treatment options.       Patient has d/c attending physical therapy at the [facility] location, due to insurance denial    She was seen and evaluated by the spine team and an Cervical MRI was obtained. Per the spine's team plan she was to have a ROSIO injection targeting the right C6-7, but was denied by work comp.    She has d/c Mobic 15mg due to GI irritation, and denies previous relief from a medrol dose pack. Taking OTC advil with minimal relief    Work Comp, she reports she has not returned to work yet and feels like she can't until she is completely pain free.     SANE 60/100  VAS 5/10      DATE OF PROCEDURE: 10/03/2023  right  1. Shoulder arthroscopic rotator cuff repair (CPT 52522) with CuffMend ()  2. Shoulder arthroscopic biceps tenodesis (CPT 61806)  3. Shoulder arthroscopic subacromial decompression, bursectomy   4. Shoulder arthroscopic extensive debridement (anterior, posterior glenohumeral joint, subacromial space) (CPT 94629)  5. Shoulder arthroscopic labral debridement (CPT 92169)  6. Shoulder arthroscopic lysis of adhesions (CPT 24801)  7. Shoulder arthroscopic distal clavicle excision (CPT 63400)     Size of tear: 10 x 15 mm, 60% partial thickness bursal          Review of Systems   Constitution: Negative. Negative for chills, fever and night sweats.   HENT: Negative for congestion and headaches.    Eyes: Negative for blurred vision, left vision loss and right vision loss.   Cardiovascular: Negative for chest pain and syncope.   Respiratory: Negative for cough and shortness of breath.    Endocrine: Negative for polydipsia, polyphagia and polyuria.   Hematologic/Lymphatic: Negative for bleeding problem. Does not bruise/bleed easily.   Skin: Negative for dry skin, itching and  rash.   Musculoskeletal: Negative for falls and muscle weakness.   Gastrointestinal: Negative for abdominal pain and bowel incontinence.   Genitourinary: Negative for bladder incontinence and nocturia.   Neurological: Negative for disturbances in coordination, loss of balance and seizures.   Psychiatric/Behavioral: Negative for depression. The patient does not have insomnia.    Allergic/Immunologic: Negative for hives and persistent infections.       PAST MEDICAL HISTORY:   Past Medical History:   Diagnosis Date    Essential (primary) hypertension      PAST SURGICAL HISTORY:   Past Surgical History:   Procedure Laterality Date    ARTHROSCOPIC DEBRIDEMENT OF SHOULDER Right 10/3/2023    Procedure: EXTENSIVE DEBRIDEMENT, SHOULDER, ARTHROSCOPIC;  Surgeon: Yessi Hahn MD;  Location: [facility] OR;  Service: Orthopedics;  Laterality: Right;    ARTHROSCOPIC REPAIR OF ROTATOR CUFF OF SHOULDER Right 10/3/2023    Procedure: REPAIR, ROTATOR CUFF, ARTHROSCOPIC WITH CUFF MEND;  Surgeon: Yessi Hahn MD;  Location: [facility] OR;  Service: Orthopedics;  Laterality: Right;    ARTHROSCOPY OF SHOULDER WITH DECOMPRESSION OF SUBACROMIAL SPACE Right 10/3/2023    Procedure: ARTHROSCOPY, SHOULDER, WITH SUBACROMIAL  DECOMPRESSION/ BURSECTOMY;  Surgeon: Yessi Hahn MD;  Location: [facility] OR;  Service: Orthopedics;  Laterality: Right;    ARTHROSCOPY,SHOULDER,WITH BICEPS TENODESIS Right 10/3/2023    Procedure: ARTHROSCOPY,SHOULDER,WITH BICEPS TENODESIS;  Surgeon: Yessi Hahn MD;  Location: [facility] OR;  Service: Orthopedics;  Laterality: Right;     SECTION  2001    DISTAL CLAVICLE EXCISION Right 10/3/2023    Procedure: EXCISION, CLAVICLE, DISTAL;  Surgeon: Yessi Hahn MD;  Location: [facility] OR;  Service: Orthopedics;  Laterality: Right;    LYSIS, ADHESIONS, SHOULDER, ARTHROSCOPIC Right 10/3/2023    Procedure: LYSIS, ADHESIONS, SHOULDER, ARTHROSCOPIC;  Surgeon: Yessi Hahn MD;  Location: [facility] OR;  Service: Orthopedics;  Laterality: Right;     SHOULDER ARTHROSCOPY Right 10/3/2023    Procedure: ARTHROSCOPY, SHOULDER WITH L;ABRAL DEBRIDEMENT;  Surgeon: Yessi Hahn MD;  Location: [facility];  Service: Orthopedics;  Laterality: Right;     FAMILY HISTORY: No family history on file.  SOCIAL HISTORY:   Social History     Socioeconomic History    Marital status: Single   Tobacco Use    Smoking status: Never    Smokeless tobacco: Never   Substance and Sexual Activity    Alcohol use: Not Currently    Drug use: Never     Social Drivers of Health     Financial Resource Strain: Low Risk  (5/8/2024)    Overall Financial Resource Strain (CARDIA)     Difficulty of Paying Living Expenses: Not hard at all   Food Insecurity: No Food Insecurity (5/8/2024)    Hunger Vital Sign     Worried About Running Out of Food in the Last Year: Never true     Ran Out of Food in the Last Year: Never true   Transportation Needs: No Transportation Needs (5/8/2024)    PRAPARE - Transportation     Lack of Transportation (Medical): No     Lack of Transportation (Non-Medical): No   Physical Activity: Inactive (5/8/2024)    Exercise Vital Sign     Days of Exercise per Week: 0 days     Minutes of Exercise per Session: 0 min   Stress: No Stress Concern Present (5/8/2024)    [employer] - Occupational Stress Questionnaire     Feeling of Stress : Not at all   Housing Stability: Unknown (5/8/2024)    Housing Stability Vital Sign     Unable to Pay for Housing in the Last Year: No       MEDICATIONS:   Current Outpatient Medications:     amLODIPine (NORVASC) 5 MG tablet, Take 1 tablet (5 mg total) by mouth once daily., Disp: 90 tablet, Rfl: 3    diazePAM (VALIUM) 2 MG tablet, Take 1 tablet (2 mg total) by mouth On call Procedure., Disp: 2 tablet, Rfl: 0    gabapentin (NEURONTIN) 100 MG capsule, Take 1 capsule (100 mg total) by mouth 3 (three) times daily., Disp: 90 capsule, Rfl: 11    meloxicam (MOBIC) 15 MG tablet, Take 1 tablet (15 mg total) by mouth once daily., Disp: 30  "tablet, Rfl: 3    naproxen (NAPROSYN) 500 MG tablet, TAKE 1 TABLET (500 MG TOTAL) BY MOUTH 2 (TWO) TIMES DAILY AS NEEDED (PAIN). TAKE WITH MEAL, Disp: 60 tablet, Rfl: 3  ALLERGIES: Review of patient's allergies indicates:  No Known Allergies    VITAL SIGNS: /85 (BP Location: Right arm, Patient Position: Sitting)   Pulse 89   Ht 4' 11" (1.499 m)   Wt 97.3 kg (214 lb 9.9 oz)   BMI 43.35 kg/m²                                                                               PHYSICAL EXAMINATION:     Incision sites healed well  No evidence of any erythema, infection or induration  elbow Range of motion full   No effusion  2+ Radial pulses  No swelling         ROM:      Forward Elevation: Active 90 Passive 130   ER: 35  IR: L4    Strength  Scaption at 0 and 30: 5/5 *with Pain  ER: 5/5                                                                          ASSESSMENT:                                                                                                                                               1. Status post above, PT denied by Work Comp.      2. Cervical radiculopathy C6-7                                                                                                                            PLAN:                                                                                                                                                     1. Resume PT, case discussed with therapist. PT ordered today. IS MEDICALLY NECESSARY TO CONTINUE PT FOR RANGE OF MOTION AND STRENGTHENING AND REHABILITATION.   Continued PT is MEDICALLY NECESSARY for her rehabilitation after her right shoulder surgery, probable duration will be 4-6 months from now.  Any denial of this continued physical therapy would constitute a significant insult to Mrs. Julian's recovery and certainly not within the standard of care.     2.  Follow up in 1.5 months     3. Consultation with Dr. Glez re- c6-7                                 "

## 2024-12-13 NOTE — PROGRESS NOTES
"Physical Therapy Daily Treatment Note     Name: Holly HEREDIA [facility] Number: [id number]  Therapy Diagnosis:        Encounter Diagnoses   Name Primary?    Adhesive capsulitis of right shoulder      Right anterior shoulder pain Yes        Physician: Yessi Hahn MD     Physician Orders: PT Eval and Treat   Medical Diagnosis from Referral:   Diagnosis   M75.01 (ICD-10-CM) - Adhesive capsulitis of right shoulder      Evaluation Date: 10/31/2024  Authorization Period Expiration: 1/22/2025  Plan of Care Expiration: 2/28/2025  Progress Note Due: 1/31/2025  Visit # / Visits authorized: 11/ 12   FOTO: 2/3     Precautions: Standard      Time 0855  Time Out: 0955  Total Appointment Time (timed & untimed codes): 55 minutes     Subjective     Pt reports: feel horrible f/u with Dr. Hahn this morning.   She was compliant with home exercise program.  Response to previous treatment: pain   Functional change: on going      Pain: 7/10  Location: right shoulder      Objective     Holly received therapeutic exercises to develop strength, endurance, ROM, flexibility, posture, and core stabilization for  minutes including:      Holly received the following manual therapy techniques: Joint mobilizations, Manual traction, and Soft tissue Mobilization were applied to the: R shld  for 10  minutes, including:  Scapular mobs   Inferior mob   Flexion 0-120 deg  ER arm by side  Oscillations       Holly participated in neuromuscular re-education activities to improve: Balance, Coordination, Kinesthetic, Sense, Proprioception, and Posture for 15 minutes. The following activities were included:  Perturbation 3x/30"  Prone R shd T's 3x10  Prone R shld Y's 3x10  Prone R shld I's 3x10    Holly participated in dynamic functional therapeutic activities to improve functional performance for 30 minutes, including:  UBE 5'/5' push/pull level 5  Pulley shld flex 5'   Pulley shld abd 5'  Sidelying ER 1# 3x10    Not today   Slot machine 2# 4x10  No money " "yellow loop 30x 3"  Supine wand ER 3x10/3"  Supine shld flex 3x10  SL shld flexion 3x10/3'  Serratus wall slide foam roll 20x  Incline flexion 2# wand 30x  LLLD infer mob prop MH + 5#  5'  Table step backs 30x   ER wand on table 30x  Supine shld flexion wand 30x        Home Exercises Provided and Patient Education Provided     Education provided:   - compliance with HEP     Written Home Exercises Provided: yes.  Exercises were reviewed and Holly was able to demonstrate them prior to the end of the session.  Holly demonstrated good  understanding of the education provided.     Assessment   Pt tolerating fair this morning. Soreness and min pain during tx maybe attributed to cold weather plus f/u visit prior to tx where MD moved shld in different ranges. VC/TC for correcting form/technique. Continue to progress as tolerated.     Holly Is progressing well towards her goals.   Pt prognosis is Good.     Pt will continue to benefit from skilled outpatient physical therapy to address the deficits listed in the problem list box on initial evaluation, provide pt/family education and to maximize pt's level of independence in the home and community environment.     Pt's spiritual, cultural and educational needs considered and pt agreeable to plan of care and goals.    Anticipated barriers to physical therapy: none     Goals: GOALS: Short Term Goals:  6 weeks  1.Report decreased shoulder pain < / =  4/10  to increase tolerance for return to exercise  2. Increase PROM full motion without pain   3. Increased strength by 1/3 MMT grade in shoulder cuff to increase tolerance for ADL and work activities.  4. Pt to tolerate HEP to improve ROM and independence with ADL's     Long Term Goals: 18 weeks  1.Report decreased shoulder pain  < / =  0 /10  to increase tolerance for return to driving  2.Increase AROM to full motion without pain in the shoulder  3.Increase strength to >/= 4/5 in cuff and low trap to increase tolerance for ADL " and work activities.  4. Pt goal: return to driving without pain   5. Pt will have improved gcode of CJ (20-40% limited) on FOTO shoulder in order to demonstrate true functional improvement.   Plan     Continue with POC     Judd Freitas, PTA, STS

## 2024-12-16 ENCOUNTER — TELEPHONE (OUTPATIENT)
Dept: PAIN MEDICINE | Facility: CLINIC | Age: 54
End: 2024-12-16
Payer: COMMERCIAL

## 2024-12-16 NOTE — TELEPHONE ENCOUNTER
Staff tried reaching patient to get her scheduled for a consult for pain. Staff left detailed message for patient to call back and reason.

## 2024-12-16 NOTE — TELEPHONE ENCOUNTER
----- Message from Med Assistant Jeff sent at 12/16/2024 11:03 AM CST -----  Regarding: RE: Scheduling  Good morning, it's placed now.  ----- Message -----  From: Omar Ro MA  Sent: 12/16/2024  10:33 AM CST  To: Jeff Colindres MA  Subject: RE: Scheduling                                   Good morning,    Can you get Dr. Hahn to place a referral for patient?      Kindly,  Pain management  ----- Message -----  From: Jeff Colindres MA  Sent: 12/13/2024   8:58 AM CST  To: Amaris Noriega Staff  Subject: Scheduling                                       Good morning, pt was seen today by . The provider want this pt to see  for a potential neck injection. Please advise. Thank you.

## 2024-12-17 ENCOUNTER — TELEPHONE (OUTPATIENT)
Dept: SPORTS MEDICINE | Facility: CLINIC | Age: 54
End: 2024-12-17
Payer: COMMERCIAL

## 2024-12-17 DIAGNOSIS — M25.511 RIGHT SHOULDER PAIN, UNSPECIFIED CHRONICITY: ICD-10-CM

## 2024-12-17 DIAGNOSIS — Z98.890 S/P RIGHT ROTATOR CUFF REPAIR: Primary | ICD-10-CM

## 2025-01-31 ENCOUNTER — TELEPHONE (OUTPATIENT)
Dept: PAIN MEDICINE | Facility: CLINIC | Age: 55
End: 2025-01-31
Payer: COMMERCIAL

## 2025-01-31 ENCOUNTER — OFFICE VISIT (OUTPATIENT)
Dept: SPORTS MEDICINE | Facility: CLINIC | Age: 55
End: 2025-01-31
Payer: OTHER MISCELLANEOUS

## 2025-01-31 VITALS
DIASTOLIC BLOOD PRESSURE: 75 MMHG | BODY MASS INDEX: 43.61 KG/M2 | WEIGHT: 215.94 LBS | HEART RATE: 94 BPM | SYSTOLIC BLOOD PRESSURE: 131 MMHG

## 2025-01-31 DIAGNOSIS — Z98.890 S/P RIGHT ROTATOR CUFF REPAIR: Primary | ICD-10-CM

## 2025-01-31 PROCEDURE — 99999 PR PBB SHADOW E&M-EST. PATIENT-LVL II: CPT | Mod: PBBFAC,,, | Performed by: ORTHOPAEDIC SURGERY

## 2025-01-31 PROCEDURE — 99214 OFFICE O/P EST MOD 30 MIN: CPT | Mod: S$GLB,,, | Performed by: ORTHOPAEDIC SURGERY

## 2025-01-31 NOTE — TELEPHONE ENCOUNTER
Staff spoke with patient and got her scheduled for a consult for pain w/ Dr. Glez on 2/12 per Dr. Hahn. Patient is in agreement to the above and verbalized understanding.

## 2025-01-31 NOTE — PROGRESS NOTES
Chief Complaint: right shoulder follow up    HISTORY OF PRESENT ILLNESS:   Pt is here today for follow up of shoulder arthroscopy.   We have reviewed patient's findings and discussed plan of care and future treatment options.      Patient has d/c attending physical therapy at the [facility] location, due to insurance denial (which is inexplicable and incorrect and certainly NOT within the standard of care).      She was seen and evaluated by the spine team and an Cervical MRI was obtained. Per the spine's team plan she was to have a ROSIO injection targeting the right C6-7, but was denied by work comp (which is inexplicable and incorrect and certainly NOT within the standard of care).    She has d/c Mobic 15mg due to GI irritation, and denies previous relief from a medrol dose pack. Taking OTC advil with minimal relief    Work Comp, she reports she has not returned to work yet and feels like she can't until she is completely pain free.     SANE 60/100  VAS 5/10      DATE OF PROCEDURE: 10/03/2023  right  1. Shoulder arthroscopic rotator cuff repair (CPT 60308) with CuffMend ()  2. Shoulder arthroscopic biceps tenodesis (CPT 96342)  3. Shoulder arthroscopic subacromial decompression, bursectomy   4. Shoulder arthroscopic extensive debridement (anterior, posterior glenohumeral joint, subacromial space) (CPT 38058)  5. Shoulder arthroscopic labral debridement (CPT 00459)  6. Shoulder arthroscopic lysis of adhesions (CPT 10534)  7. Shoulder arthroscopic distal clavicle excision (CPT 39551)     Size of tear: 10 x 15 mm, 60% partial thickness bursal          Review of Systems   Constitution: Negative. Negative for chills, fever and night sweats.   HENT: Negative for congestion and headaches.    Eyes: Negative for blurred vision, left vision loss and right vision loss.   Cardiovascular: Negative for chest pain and syncope.   Respiratory: Negative for cough and shortness of breath.    Endocrine: Negative for  polydipsia, polyphagia and polyuria.   Hematologic/Lymphatic: Negative for bleeding problem. Does not bruise/bleed easily.   Skin: Negative for dry skin, itching and rash.   Musculoskeletal: Negative for falls and muscle weakness.   Gastrointestinal: Negative for abdominal pain and bowel incontinence.   Genitourinary: Negative for bladder incontinence and nocturia.   Neurological: Negative for disturbances in coordination, loss of balance and seizures.   Psychiatric/Behavioral: Negative for depression. The patient does not have insomnia.    Allergic/Immunologic: Negative for hives and persistent infections.       PAST MEDICAL HISTORY:   Past Medical History:   Diagnosis Date    Essential (primary) hypertension      PAST SURGICAL HISTORY:   Past Surgical History:   Procedure Laterality Date    ARTHROSCOPIC DEBRIDEMENT OF SHOULDER Right 10/3/2023    Procedure: EXTENSIVE DEBRIDEMENT, SHOULDER, ARTHROSCOPIC;  Surgeon: Yessi Hahn MD;  Location: [facility] OR;  Service: Orthopedics;  Laterality: Right;    ARTHROSCOPIC REPAIR OF ROTATOR CUFF OF SHOULDER Right 10/3/2023    Procedure: REPAIR, ROTATOR CUFF, ARTHROSCOPIC WITH CUFF MEND;  Surgeon: Yessi Hahn MD;  Location: [facility] OR;  Service: Orthopedics;  Laterality: Right;    ARTHROSCOPY OF SHOULDER WITH DECOMPRESSION OF SUBACROMIAL SPACE Right 10/3/2023    Procedure: ARTHROSCOPY, SHOULDER, WITH SUBACROMIAL  DECOMPRESSION/ BURSECTOMY;  Surgeon: Yessi Hahn MD;  Location: [facility] OR;  Service: Orthopedics;  Laterality: Right;    ARTHROSCOPY,SHOULDER,WITH BICEPS TENODESIS Right 10/3/2023    Procedure: ARTHROSCOPY,SHOULDER,WITH BICEPS TENODESIS;  Surgeon: Yessi Hahn MD;  Location: [facility] OR;  Service: Orthopedics;  Laterality: Right;     SECTION  2001    DISTAL CLAVICLE EXCISION Right 10/3/2023    Procedure: EXCISION, CLAVICLE, DISTAL;  Surgeon: Yessi Hahn MD;  Location: [facility] OR;  Service: Orthopedics;  Laterality: Right;    LYSIS, ADHESIONS, SHOULDER, ARTHROSCOPIC  Right 10/3/2023    Procedure: LYSIS, ADHESIONS, SHOULDER, ARTHROSCOPIC;  Surgeon: Yessi Hahn MD;  Location: [facility] OR;  Service: Orthopedics;  Laterality: Right;    SHOULDER ARTHROSCOPY Right 10/3/2023    Procedure: ARTHROSCOPY, SHOULDER WITH L;ABRAL DEBRIDEMENT;  Surgeon: Yessi Hahn MD;  Location: [facility] OR;  Service: Orthopedics;  Laterality: Right;     FAMILY HISTORY: No family history on file.  SOCIAL HISTORY:   Social History     Socioeconomic History    Marital status: Single   Tobacco Use    Smoking status: Never    Smokeless tobacco: Never   Substance and Sexual Activity    Alcohol use: Not Currently    Drug use: Never     Social Drivers of Health     Financial Resource Strain: Low Risk  (5/8/2024)    Overall Financial Resource Strain (CARDIA)     Difficulty of Paying Living Expenses: Not hard at all   Food Insecurity: No Food Insecurity (5/8/2024)    Hunger Vital Sign     Worried About Running Out of Food in the Last Year: Never true     Ran Out of Food in the Last Year: Never true   Transportation Needs: No Transportation Needs (5/8/2024)    PRAPARE - Transportation     Lack of Transportation (Medical): No     Lack of Transportation (Non-Medical): No   Physical Activity: Inactive (5/8/2024)    Exercise Vital Sign     Days of Exercise per Week: 0 days     Minutes of Exercise per Session: 0 min   Stress: No Stress Concern Present (5/8/2024)    [employer] [place] [employer] - Occupational Stress Questionnaire     Feeling of Stress : Not at all   Housing Stability: Unknown (5/8/2024)    Housing Stability Vital Sign     Unable to Pay for Housing in the Last Year: No       MEDICATIONS:   Current Outpatient Medications:     amLODIPine (NORVASC) 5 MG tablet, Take 1 tablet (5 mg total) by mouth once daily., Disp: 90 tablet, Rfl: 3    diazePAM (VALIUM) 2 MG tablet, Take 1 tablet (2 mg total) by mouth On call Procedure., Disp: 2 tablet, Rfl: 0    gabapentin (NEURONTIN) 100 MG capsule, Take 1 capsule (100  mg total) by mouth 3 (three) times daily. (Patient not taking: Reported on 1/31/2025), Disp: 90 capsule, Rfl: 11    meloxicam (MOBIC) 15 MG tablet, Take 1 tablet (15 mg total) by mouth once daily. (Patient not taking: Reported on 1/31/2025), Disp: 30 tablet, Rfl: 3    naproxen (NAPROSYN) 500 MG tablet, TAKE 1 TABLET (500 MG TOTAL) BY MOUTH 2 (TWO) TIMES DAILY AS NEEDED (PAIN). TAKE WITH MEAL (Patient not taking: Reported on 1/31/2025), Disp: 60 tablet, Rfl: 3  ALLERGIES: Review of patient's allergies indicates:  No Known Allergies    VITAL SIGNS: /75 (BP Location: Left arm, Patient Position: Sitting)   Pulse 94   Wt 98 kg (215 lb 15.1 oz)   BMI 43.61 kg/m²                                                                               PHYSICAL EXAMINATION:     Incision sites healed well  No evidence of any erythema, infection or induration  elbow Range of motion full   No effusion  2+ Radial pulses  No swelling    ++ radicular pain to c6-7         ROM:      Forward Elevation: Active 120 Passive 140  ER: 35  IR: L2    Strength  [place]aption at 0 and 30: 5/5 *with Pain  ER: 5/5                                                                          ASSESSMENT:                                                                                                                                               1. Status post above, PT denied by Work Comp.      2. Cervical radiculopathy C6-7                                                                                                                            PLAN:                                                                                                                                                     1. Resume PT, case discussed with therapist. PT ordered today. IS MEDICALLY NECESSARY TO CONTINUE PT FOR RANGE OF MOTION AND STRENGTHENING AND REHABILITATION.   Continued PT is MEDICALLY NECESSARY for her rehabilitation after her right shoulder surgery, probable  duration will be 4-6 months from now.  Any denial of this continued physical therapy would constitute a significant insult to Mrs. Julian's recovery and certainly not within the standard of care.     2.  Follow up in 1.5 months     3. Consultation with Dr. Glez re- c6-7 this is medically necessary.  The neck component is significant and likely the source of pain.  The steroid shot in the shoulder (with local anesthetic) only helped 20-30%.  Showing the neck as the likely source.  ++ radicular pain to c6-7

## 2025-01-31 NOTE — TELEPHONE ENCOUNTER
----- Message from Tech Alicia sent at 1/31/2025 11:36 AM CST -----  Good morning,     Dr. Hahn saw the patient today for follow up. Y'all have tried to call her previously to schedule a consult with Dr. Glez to discuss a possible injection for cervical radiculopathy. Could you try to call her again to get her scheduled please, this will be going through her personal insurance, not workers comp.     Thank you    Alicia Hernandez   Clinical/OR assistant to Dr. Yessi Hahn

## 2025-02-03 ENCOUNTER — TELEPHONE (OUTPATIENT)
Dept: PAIN MEDICINE | Facility: CLINIC | Age: 55
End: 2025-02-03
Payer: COMMERCIAL

## 2025-02-03 NOTE — TELEPHONE ENCOUNTER
Staff spoke with patient. Patient states she needs to reschedule her appointment. Staff rescheduled her for 2/19 with Dr. Glez. Patient is in agreement to the above and verbalized understanding.

## 2025-02-03 NOTE — TELEPHONE ENCOUNTER
----- Message from [employer] sent at 2/3/2025 11:54 AM CST -----   Name of Who is Calling:     What is the request in detail:  patient request call back in reference to reschedule appointment (worker comp appointment ) / patient want to know if have date 02/20 26 or 27  available or anytime in early march Please contact to further discuss and advise      Can the clinic reply by [facility]:     What Number to Call Back if not in [facility]:   [phone]

## 2025-02-19 ENCOUNTER — OFFICE VISIT (OUTPATIENT)
Dept: PAIN MEDICINE | Facility: CLINIC | Age: 55
End: 2025-02-19
Attending: ANESTHESIOLOGY
Payer: OTHER MISCELLANEOUS

## 2025-02-19 VITALS
SYSTOLIC BLOOD PRESSURE: 131 MMHG | DIASTOLIC BLOOD PRESSURE: 75 MMHG | WEIGHT: 216.06 LBS | TEMPERATURE: 98 F | BODY MASS INDEX: 43.64 KG/M2 | HEART RATE: 94 BPM

## 2025-02-19 DIAGNOSIS — Z98.890 S/P ROTATOR CUFF REPAIR: ICD-10-CM

## 2025-02-19 DIAGNOSIS — M54.12 CERVICAL RADICULOPATHY: ICD-10-CM

## 2025-02-19 DIAGNOSIS — M47.812 CERVICAL SPONDYLOSIS: Primary | ICD-10-CM

## 2025-02-19 NOTE — PROGRESS NOTES
Chronic Pain - New Consult    PCP: Ruddy Ho MD    REFERRING PHYSICIAN: Yessi Hahn MD    CHIEF COMPLAINT: neck and right arm pain    Original HISTORY OF PRESENT ILLNESS: Holly Julian presents to the clinic for the evaluation of the above pain.       Original Pain Description:  The pain started after her rotator cuff repair surgery in 10/2024.  Neck pain started a few months after  The pain is located in the right and is radiating to the index and middle fingers .   The pain is described as numbing, shooting, and tingling.   Exacerbating factors: any movement, overhead movements, carrying groceries.   Mitigating factors nothing.   Symptoms interfere with daily activity and sleeping.   The patient feels like symptoms have been worsening.   Patient denies night fever/night sweats, urinary incontinence, and bowel incontinence.    Original PAIN SCORES:  Best: Pain is 8  Worst: Pain is 10  Current: Pain is 8      INTERVAL HISTORY (Date):        (Newest visit at the top)       6 weeks of Conservative therapy:  Physical Therapy: Yes, last session was in 12/2024.  The patient has diligently completed over six weeks of physical therapy and continues to follow a regimen of daily home exercises as prescribed by their physician and physical therapist.  Home Exercises Program: Yes, limited relief  Chiropractic [place]: No      Treatments / Medications: (Ice/Heat/NSAIDS/APAP/etc):  Tylenol - currently taking  Meloxicam, Naporxen - not helpful  Gabapentin - made sleepy    Blood thinners: None    Interventional Pain Procedures: (Previous injections)  None      IMAGING:    CERVICAL MRI  Results for orders placed in visit on 04/16/24    MRI Cervical Spine Without Contrast    Narrative  EXAMINATION:  MRI CERVICAL SPINE WITHOUT CONTRAST    CLINICAL HISTORY:  Neck pain, chronic, degenerative changes on xray;.  Radiculopathy, cervical region    TECHNIQUE:  Multiplanar, multisequence MR images of the cervical spine were acquired  without the administration of contrast.    COMPARISON:  None.    FINDINGS:  ALIGNMENT: Straightening of the cervical spine.  No spondylolisthesis.    BONE: No compression fractures.  No marrow replacing lesions.    JOINT: Disc desiccation at multiple levels.  Intervertebral disc heights are preserved.  Multilevel facet arthropathy.  No bone marrow edema.    SPINAL CANAL: The cervical spinal cord is unremarkable.  No mass or collection.    POSTERIOR FOSSA: Unremarkable.    PARASPINAL SOFT TISSUES: Unremarkable.    SIGNIFICANT FINDINGS BY LEVEL:    C2-3: Right facet arthropathy resulting in mild right foraminal stenosis.    C3-4: Unremarkable.    C4-5: Disc osteophyte complex.  No canal stenosis.  Mild right foraminal stenosis.    C5-6: Disc osteophyte complex.  No canal stenosis.  No foraminal stenosis.    C6-7: Disc osteophyte complex.  No canal stenosis.  Mild left foraminal stenosis.    C7-T1: Unremarkable.    Impression  Mild degenerative changes.  No high-grade stenosis.      Electronically signed by: Duane Bass  Date:    05/07/2024  Time:    14:48         Past Medical History:   Diagnosis Date    Essential (primary) hypertension      Past Surgical History:   Procedure Laterality Date    ARTHROSCOPIC DEBRIDEMENT OF SHOULDER Right 10/3/2023    Procedure: EXTENSIVE DEBRIDEMENT, SHOULDER, ARTHROSCOPIC;  Surgeon: Yessi Hahn MD;  Location: [facility] OR;  Service: Orthopedics;  Laterality: Right;    ARTHROSCOPIC REPAIR OF ROTATOR CUFF OF SHOULDER Right 10/3/2023    Procedure: REPAIR, ROTATOR CUFF, ARTHROSCOPIC WITH CUFF MEND;  Surgeon: Yessi aHhn MD;  Location: [facility] OR;  Service: Orthopedics;  Laterality: Right;    ARTHROSCOPY OF SHOULDER WITH DECOMPRESSION OF SUBACROMIAL SPACE Right 10/3/2023    Procedure: ARTHROSCOPY, SHOULDER, WITH SUBACROMIAL  DECOMPRESSION/ BURSECTOMY;  Surgeon: Yessi Hahn MD;  Location: [facility] OR;  Service: Orthopedics;  Laterality: Right;    ARTHROSCOPY,SHOULDER,WITH BICEPS TENODESIS Right  10/3/2023    Procedure: ARTHROSCOPY,SHOULDER,WITH BICEPS TENODESIS;  Surgeon: Yessi Hahn MD;  Location: [facility] OR;  Service: Orthopedics;  Laterality: Right;     SECTION  2001    DISTAL CLAVICLE EXCISION Right 10/3/2023    Procedure: EXCISION, CLAVICLE, DISTAL;  Surgeon: Yessi Hahn MD;  Location: [facility] OR;  Service: Orthopedics;  Laterality: Right;    LYSIS, ADHESIONS, SHOULDER, ARTHROSCOPIC Right 10/3/2023    Procedure: LYSIS, ADHESIONS, SHOULDER, ARTHROSCOPIC;  Surgeon: Yessi Hahn MD;  Location: [facility] OR;  Service: Orthopedics;  Laterality: Right;    SHOULDER ARTHROSCOPY Right 10/3/2023    Procedure: ARTHROSCOPY, SHOULDER WITH L;ABRAL DEBRIDEMENT;  Surgeon: Yessi Hahn MD;  Location: [facility] OR;  Service: Orthopedics;  Laterality: Right;     Social History[1]  No family history on file.    Review of patient's allergies indicates:  No Known Allergies    Current Outpatient Medications   Medication Sig    amLODIPine (NORVASC) 5 MG tablet Take 1 tablet (5 mg total) by mouth once daily.    diazePAM (VALIUM) 2 MG tablet Take 1 tablet (2 mg total) by mouth On call Procedure.     No current facility-administered medications for this visit.         REVIEW OF SYSTEM  GENERAL: Denies fever, chills, fatigue.    HEENT: Denies eye pain, double vision.  Denies ear pain.  CV: Denies chest pain, edema  PULM: Denies of shortness of breath, new cough, wheezing  GI: Denies abdominal pain, nausea, vomiting, constipation.  : Denies new urinary urgency, frequency, pain with urination.  ENDO: Denies excessive thirst, new heat/cold intolerance  MSK:  + Pain, see HPI  SKIN: Denies new rash, skin lesions   NEURO: Denies new dizziness, new seizures.   PSYCH: Denies new sleep disturbances, mood changes, suicidal thoughts.        VITALS:   Vitals:    25 0908   BP: 131/75   Pulse: 94   Temp: 97.6 °F (36.4 °C)   Weight: 98 kg (216 lb 0.8 oz)   PainSc:   8   PainLoc: Neck       PAIN PDI SCORES      2025     9:08 AM    Last 3 PDI Scores   Pain Disability Index (PDI) 48         PHYSICAL EXAM:   GENERAL: Well appearing, in no acute distress, alert and oriented x3.  PSYCH:  Mood and affect appropriate.  SKIN: Skin color, texture, turgor normal, no rashes or lesions.  HEENT:  Normocephalic, atraumatic. Cranial nerves grossly intact.  NECK:   POSITIVE BILATERALLY pain to palpation over the cervical paraspinous muscles.   Negative pain to palpation over facets.   POSITIVE BILATERALLY pain with neck flexion, extension, or lateral flexion.   Spurling test is Negative Bilaterally  TTP of right paraspinals and trapezius  PULM: No evidence of respiratory difficulty, symmetric chest rise.  GI:  Non-distended  EXTREMITIES: No deformities, edema, or skin discoloration.  No atrophy is noted   Right shoulder ROM limited and shoulder provocative exam grossly positive in all motion.  CTS and Phalens negative  NEURO: Sensation is equal and appropriate bilaterally. Bilateral upper and lower extremity strength is normal and symmetric. Bilateral upper and lower extremity coordination and muscle stretch reflexes are physiologic and symmetric. Plantar response are downgoing.   GAIT: Normal, ambulates with no assistive devices      Right hand appears darker than right    ASSESSMENT: 55 y.o. year old with hx of right rotator cuff repair present with right neck and right arm pain. Unclear etiology at this time but DDX can include cervical radiculopathy vs TOS vs CTS vs CRPS.  We will obtain EMG to further evaluation:      1. Cervical spondylosis  Ambulatory Referral/Consult to Physical Therapy/Occupational Therapy      2. S/P rotator cuff repair        3. Cervical radiculopathy  EMG W/ ULTRASOUND AND NERVE CONDUCTION TEST 2 Extremities    Ambulatory Referral/Consult to Physical Therapy/Occupational Therapy            PLAN:  We discussed with the patient the assessment and recommendations. The following is the plan we agreed on:   Patient  Education:  Discussed the importance of physical therapy, activity modification, and a home exercise plan to help with pain and improve health.  Therapy referral: prescribed physical therapy for neck and right shoulder pain  Medications:   Patient can continue with medications for now since they are providing benefits, using them appropriately, and without side effects.  Continue tylenol as needed for pain  Ordered EMG to eval right cervical radic vs TOS vs CTS  Follow up with Dr. Hahn  Imaging: cervical MRI reviewed  Return to clinic: 8 weeks      I would like to thank Yessi Hahn MD for the opportunity to assist in the care of this patient. We had a very nice visit and I look forward to continuing their care. Please let me know if I can be of further assistance.     The above plan and management options were discussed at length with patient. Patient is in agreement with the above and verbalized understanding. It will be communicated with the referring physician via electronic record, fax, or mail.      Tiffany Christian MD  02/19/2025  Visit today included increased complexity associated with the care of the episodic problem of chronic pain which was addressed and continue to manage the longitudinal care of the patient due to the serious and/or complex managed problem(s) listed above.   I have personally taken the history and examined this patient and agree with the fellow's note as stated above.        [1]   Social History  Socioeconomic History    Marital status: Single   Tobacco Use    Smoking status: Never    Smokeless tobacco: Never   Substance and Sexual Activity    Alcohol use: Not Currently    Drug use: Never     Social Drivers of Health     Financial Resource Strain: Low Risk  (5/8/2024)    Overall Financial Resource Strain (CARDIA)     Difficulty of Paying Living Expenses: Not hard at all   Food Insecurity: No Food Insecurity (5/8/2024)    Hunger Vital Sign     Worried About Running Out of Food in the Last  Year: Never true     Ran Out of Food in the Last Year: Never true   Transportation Needs: No Transportation Needs (5/8/2024)    PRAPARE - Transportation     Lack of Transportation (Medical): No     Lack of Transportation (Non-Medical): No   Physical Activity: Inactive (5/8/2024)    Exercise Vital Sign     Days of Exercise per Week: 0 days     Minutes of Exercise per Session: 0 min   Stress: No Stress Concern Present (5/8/2024)    [employer] [place] [employer] - Occupational Stress Questionnaire     Feeling of Stress : Not at all   Housing Stability: Unknown (5/8/2024)    Housing Stability Vital Sign     Unable to Pay for Housing in the Last Year: No

## 2025-02-25 ENCOUNTER — PATIENT OUTREACH (OUTPATIENT)
Dept: ADMINISTRATIVE | Facility: HOSPITAL | Age: 55
End: 2025-02-25
Payer: OTHER MISCELLANEOUS

## 2025-02-25 VITALS — SYSTOLIC BLOOD PRESSURE: 131 MMHG | DIASTOLIC BLOOD PRESSURE: 75 MMHG

## 2025-04-16 ENCOUNTER — OFFICE VISIT (OUTPATIENT)
Dept: PAIN MEDICINE | Facility: CLINIC | Age: 55
End: 2025-04-16
Attending: ANESTHESIOLOGY
Payer: OTHER MISCELLANEOUS

## 2025-04-16 VITALS
DIASTOLIC BLOOD PRESSURE: 67 MMHG | WEIGHT: 213.88 LBS | SYSTOLIC BLOOD PRESSURE: 142 MMHG | TEMPERATURE: 98 F | BODY MASS INDEX: 43.19 KG/M2 | HEART RATE: 83 BPM

## 2025-04-16 DIAGNOSIS — M25.611 IMPAIRED RANGE OF MOTION OF RIGHT SHOULDER: ICD-10-CM

## 2025-04-16 DIAGNOSIS — M25.511 CHRONIC RIGHT SHOULDER PAIN: ICD-10-CM

## 2025-04-16 DIAGNOSIS — G89.29 CHRONIC RIGHT SHOULDER PAIN: ICD-10-CM

## 2025-04-16 DIAGNOSIS — M25.511 RIGHT ANTERIOR SHOULDER PAIN: Primary | ICD-10-CM

## 2025-04-16 NOTE — PROGRESS NOTES
Chronic Pain - Established Patient - Follow up    PCP: Ruddy Ho MD    REFERRING PHYSICIAN: Natividad Diaz    CHIEF COMPLAINT: neck and right arm pain    Original HISTORY OF PRESENT ILLNESS: Holly Julian presents to the clinic for the evaluation of the above pain.       Original Pain Description:  The pain started after her rotator cuff repair surgery in 10/2024.  Neck pain started a few months after  The pain is located in the right and is radiating to the index and middle fingers .   The pain is described as numbing, shooting, and tingling.   Exacerbating factors: any movement, overhead movements, carrying groceries.   Mitigating factors nothing.   Symptoms interfere with daily activity and sleeping.   The patient feels like symptoms have been worsening.   Patient denies night fever/night sweats, urinary incontinence, and bowel incontinence.    Original PAIN SCORES:  Best: Pain is 8  Worst: Pain is 10  Current: Pain is 8      INTERVAL HISTORY (Date):        (Newest visit at the top)     Interval History 4/16/2025:  Holly Julian presents for follow up. She was unable to get the EMG performed. Her next visit with Dr. Hahn is scheduled for 05/05. She is having difficulty getting things scheduled through workers compensation. 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 She has also not been able to continue with physical therapy.      6 weeks of Conservative therapy:  Physical Therapy: Yes, last session was in 12/2024.  The patient has diligently completed over six weeks of physical therapy and continues to follow a regimen of daily home exercises as prescribed by their physician and physical therapist.  Home Exercises Program: Yes, limited relief  Chiropractic [place]: No      Treatments / Medications: (Ice/Heat/NSAIDS/APAP/etc):  Tylenol - currently taking  Meloxicam, Naporxen - not helpful  Gabapentin - made sleepy    Blood thinners: None    Interventional Pain Procedures: (Previous  injections)  None      IMAGING:    CERVICAL MRI  Results for orders placed in visit on 04/16/24    MRI Cervical Spine Without Contrast    Narrative  EXAMINATION:  MRI CERVICAL SPINE WITHOUT CONTRAST    CLINICAL HISTORY:  Neck pain, chronic, degenerative changes on xray;.  Radiculopathy, cervical region    TECHNIQUE:  Multiplanar, multisequence MR images of the cervical spine were acquired without the administration of contrast.    COMPARISON:  None.    FINDINGS:  ALIGNMENT: Straightening of the cervical spine.  No spondylolisthesis.    BONE: No compression fractures.  No marrow replacing lesions.    JOINT: Disc desiccation at multiple levels.  Intervertebral disc heights are preserved.  Multilevel facet arthropathy.  No bone marrow edema.    SPINAL CANAL: The cervical spinal cord is unremarkable.  No mass or collection.    POSTERIOR FOSSA: Unremarkable.    PARASPINAL SOFT TISSUES: Unremarkable.    SIGNIFICANT FINDINGS BY LEVEL:    C2-3: Right facet arthropathy resulting in mild right foraminal stenosis.    C3-4: Unremarkable.    C4-5: Disc osteophyte complex.  No canal stenosis.  Mild right foraminal stenosis.    C5-6: Disc osteophyte complex.  No canal stenosis.  No foraminal stenosis.    C6-7: Disc osteophyte complex.  No canal stenosis.  Mild left foraminal stenosis.    C7-T1: Unremarkable.    Impression  Mild degenerative changes.  No high-grade stenosis.      Electronically signed by: Duane Bass  Date:    05/07/2024  Time:    14:48         Past Medical History:   Diagnosis Date    Essential (primary) hypertension      Past Surgical History:   Procedure Laterality Date    ARTHROSCOPIC DEBRIDEMENT OF SHOULDER Right 10/3/2023    Procedure: EXTENSIVE DEBRIDEMENT, SHOULDER, ARTHROSCOPIC;  Surgeon: Yessi Hahn MD;  Location: [facility];  Service: Orthopedics;  Laterality: Right;    ARTHROSCOPIC REPAIR OF ROTATOR CUFF OF SHOULDER Right 10/3/2023    Procedure: REPAIR, ROTATOR CUFF, ARTHROSCOPIC WITH CUFF MEND;  Surgeon:  Yessi Hahn MD;  Location: [facility] OR;  Service: Orthopedics;  Laterality: Right;    ARTHROSCOPY OF SHOULDER WITH DECOMPRESSION OF SUBACROMIAL SPACE Right 10/3/2023    Procedure: ARTHROSCOPY, SHOULDER, WITH SUBACROMIAL  DECOMPRESSION/ BURSECTOMY;  Surgeon: Yessi Hahn MD;  Location: [facility] OR;  Service: Orthopedics;  Laterality: Right;    ARTHROSCOPY,SHOULDER,WITH BICEPS TENODESIS Right 10/3/2023    Procedure: ARTHROSCOPY,SHOULDER,WITH BICEPS TENODESIS;  Surgeon: Yessi Hahn MD;  Location: [facility] OR;  Service: Orthopedics;  Laterality: Right;     SECTION  2001    DISTAL CLAVICLE EXCISION Right 10/3/2023    Procedure: EXCISION, CLAVICLE, DISTAL;  Surgeon: Yessi Hahn MD;  Location: [facility] OR;  Service: Orthopedics;  Laterality: Right;    LYSIS, ADHESIONS, SHOULDER, ARTHROSCOPIC Right 10/3/2023    Procedure: LYSIS, ADHESIONS, SHOULDER, ARTHROSCOPIC;  Surgeon: Yessi Hahn MD;  Location: [facility] OR;  Service: Orthopedics;  Laterality: Right;    SHOULDER ARTHROSCOPY Right 10/3/2023    Procedure: ARTHROSCOPY, SHOULDER WITH L;ABRAL DEBRIDEMENT;  Surgeon: Yessi Hahn MD;  Location: [facility] OR;  Service: Orthopedics;  Laterality: Right;     Social History[1]  No family history on file.    Review of patient's allergies indicates:  No Known Allergies    Current Outpatient Medications   Medication Sig    amLODIPine (NORVASC) 5 MG tablet Take 1 tablet (5 mg total) by mouth once daily.    diazePAM (VALIUM) 2 MG tablet Take 1 tablet (2 mg total) by mouth On call Procedure.     No current facility-administered medications for this visit.         REVIEW OF SYSTEM  GENERAL: Denies fever, chills, fatigue.    HEENT: Denies eye pain, double vision.  Denies ear pain.  CV: Denies chest pain, edema  PULM: Denies of shortness of breath, new cough, wheezing  GI: Denies abdominal pain, nausea, vomiting, constipation.  : Denies new urinary urgency, frequency, pain with urination.  ENDO: Denies excessive thirst, new heat/cold  intolerance  MSK:  + Pain, see HPI  SKIN: Denies new rash, skin lesions   NEURO: Denies new dizziness, new seizures.   PSYCH: Denies new sleep disturbances, mood changes, suicidal thoughts.        VITALS:   There were no vitals filed for this visit.      PAIN PDI SCORES      2/19/2025     9:08 AM   Last 3 PDI Scores   Pain Disability Index (PDI) 48         PHYSICAL EXAM:   GENERAL: Well appearing, in no acute distress, alert and oriented x3.  PSYCH:  Mood and affect appropriate.  SKIN: Skin color, texture, turgor normal, no rashes or lesions.  HEENT:  Normocephalic, atraumatic. Cranial nerves grossly intact.  NECK:   POSITIVE BILATERALLY pain to palpation over the cervical paraspinous muscles.   Negative pain to palpation over facets.   POSITIVE BILATERALLY pain with neck flexion, extension, or lateral flexion.   Spurling test is Negative Bilaterally  TTP of right paraspinals and trapezius  PULM: No evidence of respiratory difficulty, symmetric chest rise.  GI:  Non-distended  EXTREMITIES: No deformities, edema, or skin discoloration.  No atrophy is noted   Right shoulder ROM limited and shoulder provocative exam grossly positive in all motion.  CTS and Phalens negative  NEURO: Sensation is equal and appropriate bilaterally. Bilateral upper and lower extremity strength is normal and symmetric. Bilateral upper and lower extremity coordination and muscle stretch reflexes are physiologic and symmetric. Plantar response are downgoing.   GAIT: Normal, ambulates with no assistive devices      Right hand appears darker than right    ASSESSMENT: 55 y.o. year old with hx of right rotator cuff repair present with right neck and right arm pain. Unclear etiology at this time but DDX can include cervical radiculopathy vs TOS vs CTS vs CRPS.  We will obtain EMG to further evaluation:      1. Right anterior shoulder pain        2. Impaired range of motion of right shoulder        3. Chronic right shoulder pain                 PLAN:  We discussed with the patient the assessment and recommendations. The following is the plan we agreed on:   Patient Education:  Discussed the importance of physical therapy, activity modification, and a home exercise plan to help with pain and improve health.  Therapy referral: continue physical therapy for neck and right shoulder pain  Medications:   Patient can continue with medications for now since they are providing benefits, using them appropriately, and without side effects.  Continue tylenol as needed for pain  Ordered EMG to eval right cervical radic vs TOS vs CTS. Once again discussed importance of getting this performed.   Follow up with Dr. Hahn  Imaging: cervical MRI reviewed  Return to clinic: after EMG      The above plan and management options were discussed at length with patient. Patient is in agreement with the above and verbalized understanding. It will be communicated with the referring physician via electronic record, fax, or mail.      Dioni Griffith MD  04/16/2025  Visit today included increased complexity associated with the care of the episodic problem of chronic pain which was addressed and continue to manage the longitudinal care of the patient due to the serious and/or complex managed problem(s) listed above.   I have personally taken the history and examined this patient and agree with the fellow's note as stated above.            [1]   Social History  Socioeconomic History    Marital status: Single   Tobacco Use    Smoking status: Never    Smokeless tobacco: Never   Substance and Sexual Activity    Alcohol use: Not Currently    Drug use: Never     Social Drivers of Health     Financial Resource Strain: Low Risk  (5/8/2024)    Overall Financial Resource Strain (CARDIA)     Difficulty of Paying Living Expenses: Not hard at all   Food Insecurity: No Food Insecurity (5/8/2024)    Hunger Vital Sign     Worried About Running Out of Food in the Last Year: Never true     Ran Out  of Food in the Last Year: Never true   Transportation Needs: No Transportation Needs (5/8/2024)    PRAPARE - Transportation     Lack of Transportation (Medical): No     Lack of Transportation (Non-Medical): No   Physical Activity: Inactive (5/8/2024)    Exercise Vital Sign     Days of Exercise per Week: 0 days     Minutes of Exercise per Session: 0 min   Stress: No Stress Concern Present (5/8/2024)    [employer] [place] [employer] - Occupational Stress Questionnaire     Feeling of Stress : Not at all   Housing Stability: Unknown (5/8/2024)    Housing Stability Vital Sign     Unable to Pay for Housing in the Last Year: No

## 2025-05-05 ENCOUNTER — OFFICE VISIT (OUTPATIENT)
Dept: SPORTS MEDICINE | Facility: CLINIC | Age: 55
End: 2025-05-05
Payer: OTHER MISCELLANEOUS

## 2025-05-05 VITALS
DIASTOLIC BLOOD PRESSURE: 78 MMHG | HEIGHT: 59 IN | BODY MASS INDEX: 43.14 KG/M2 | SYSTOLIC BLOOD PRESSURE: 147 MMHG | WEIGHT: 214 LBS | HEART RATE: 92 BPM

## 2025-05-05 DIAGNOSIS — Z98.890 S/P RIGHT ROTATOR CUFF REPAIR: Primary | ICD-10-CM

## 2025-05-05 PROCEDURE — 99214 OFFICE O/P EST MOD 30 MIN: CPT | Mod: S$GLB,,, | Performed by: ORTHOPAEDIC SURGERY

## 2025-05-05 PROCEDURE — 99999 PR PBB SHADOW E&M-EST. PATIENT-LVL III: CPT | Mod: PBBFAC,,, | Performed by: ORTHOPAEDIC SURGERY

## 2025-05-05 NOTE — PROGRESS NOTES
Chief Complaint: right shoulder follow up    HISTORY OF PRESENT ILLNESS:   Pt is here today for follow up of shoulder arthroscopy.   We have reviewed patient's findings and discussed plan of care and future treatment options.      Patient has d/c attending physical therapy at the [facility] location, due to insurance denial (which is inexplicable and incorrect and certainly NOT within the standard of care).    She was seen and evaluated by the spine team and an Cervical MRI was obtained. Per the spine's team plan she was to have a ROSIO injection targeting the right C6-7, but was denied by work comp (which is inexplicable and incorrect and certainly NOT within the standard of care).    Work Comp, she reports she has not returned to work yet and feels like she can't until she is completely pain free.     Shoulder SANE 75/100, neck 30/100  VAS 9/10 - radiculopathy  VAS 3/10 left shoulder    DATE OF PROCEDURE: 10/03/2023  right  1. Shoulder arthroscopic rotator cuff repair (CPT 04629) with CuffMend ()  2. Shoulder arthroscopic biceps tenodesis (CPT 12489)  3. Shoulder arthroscopic subacromial decompression, bursectomy   4. Shoulder arthroscopic extensive debridement (anterior, posterior glenohumeral joint, subacromial space) (CPT 97460)  5. Shoulder arthroscopic labral debridement (CPT 87431)  6. Shoulder arthroscopic lysis of adhesions (CPT 73403)  7. Shoulder arthroscopic distal clavicle excision (CPT 45004)     Size of tear: 10 x 15 mm, 60% partial thickness bursal          Review of Systems   Constitution: Negative. Negative for chills, fever and night sweats.   HENT: Negative for congestion and headaches.    Eyes: Negative for blurred vision, left vision loss and right vision loss.   Cardiovascular: Negative for chest pain and syncope.   Respiratory: Negative for cough and shortness of breath.    Endocrine: Negative for polydipsia, polyphagia and polyuria.   Hematologic/Lymphatic: Negative for bleeding  problem. Does not bruise/bleed easily.   Skin: Negative for dry skin, itching and rash.   Musculoskeletal: Negative for falls and muscle weakness.   Gastrointestinal: Negative for abdominal pain and bowel incontinence.   Genitourinary: Negative for bladder incontinence and nocturia.   Neurological: Negative for disturbances in coordination, loss of balance and seizures.   Psychiatric/Behavioral: Negative for depression. The patient does not have insomnia.    Allergic/Immunologic: Negative for hives and persistent infections.       PAST MEDICAL HISTORY:   Past Medical History:   Diagnosis Date    Essential (primary) hypertension      PAST SURGICAL HISTORY:   Past Surgical History:   Procedure Laterality Date    ARTHROSCOPIC DEBRIDEMENT OF SHOULDER Right 10/3/2023    Procedure: EXTENSIVE DEBRIDEMENT, SHOULDER, ARTHROSCOPIC;  Surgeon: Yessi Hahn MD;  Location: [facility] OR;  Service: Orthopedics;  Laterality: Right;    ARTHROSCOPIC REPAIR OF ROTATOR CUFF OF SHOULDER Right 10/3/2023    Procedure: REPAIR, ROTATOR CUFF, ARTHROSCOPIC WITH CUFF MEND;  Surgeon: Yessi Hahn MD;  Location: [facility] OR;  Service: Orthopedics;  Laterality: Right;    ARTHROSCOPY OF SHOULDER WITH DECOMPRESSION OF SUBACROMIAL SPACE Right 10/3/2023    Procedure: ARTHROSCOPY, SHOULDER, WITH SUBACROMIAL  DECOMPRESSION/ BURSECTOMY;  Surgeon: Yessi Hahn MD;  Location: [facility] OR;  Service: Orthopedics;  Laterality: Right;    ARTHROSCOPY,SHOULDER,WITH BICEPS TENODESIS Right 10/3/2023    Procedure: ARTHROSCOPY,SHOULDER,WITH BICEPS TENODESIS;  Surgeon: Yessi Hahn MD;  Location: [facility] OR;  Service: Orthopedics;  Laterality: Right;     SECTION  2001    DISTAL CLAVICLE EXCISION Right 10/3/2023    Procedure: EXCISION, CLAVICLE, DISTAL;  Surgeon: Yessi Hahn MD;  Location: [facility] OR;  Service: Orthopedics;  Laterality: Right;    LYSIS, ADHESIONS, SHOULDER, ARTHROSCOPIC Right 10/3/2023    Procedure: LYSIS, ADHESIONS, SHOULDER, ARTHROSCOPIC;  Surgeon:  Yessi Hahn MD;  Location: [facility] OR;  Service: Orthopedics;  Laterality: Right;    SHOULDER ARTHROSCOPY Right 10/3/2023    Procedure: ARTHROSCOPY, SHOULDER WITH L;ABRAL DEBRIDEMENT;  Surgeon: Yessi Hahn MD;  Location: [facility] OR;  Service: Orthopedics;  Laterality: Right;     FAMILY HISTORY: No family history on file.  SOCIAL HISTORY:   Social History     Socioeconomic History    Marital status: Single   Tobacco Use    Smoking status: Never    Smokeless tobacco: Never   Substance and Sexual Activity    Alcohol use: Not Currently    Drug use: Never     Social Drivers of Health     Financial Resource Strain: Low Risk  (5/8/2024)    Overall Financial Resource Strain (CARDIA)     Difficulty of Paying Living Expenses: Not hard at all   Food Insecurity: No Food Insecurity (5/8/2024)    Hunger Vital Sign     Worried About Running Out of Food in the Last Year: Never true     Ran Out of Food in the Last Year: Never true   Transportation Needs: No Transportation Needs (5/8/2024)    PRAPARE - Transportation     Lack of Transportation (Medical): No     Lack of Transportation (Non-Medical): No   Physical Activity: Inactive (5/8/2024)    Exercise Vital Sign     Days of Exercise per Week: 0 days     Minutes of Exercise per Session: 0 min   Stress: No Stress Concern Present (5/8/2024)    [employer] [place] [employer] - Occupational Stress Questionnaire     Feeling of Stress : Not at all   Housing Stability: Unknown (5/8/2024)    Housing Stability Vital Sign     Unable to Pay for Housing in the Last Year: No       MEDICATIONS:   Current Outpatient Medications:     amLODIPine (NORVASC) 5 MG tablet, Take 1 tablet (5 mg total) by mouth once daily., Disp: 90 tablet, Rfl: 3    diazePAM (VALIUM) 2 MG tablet, Take 1 tablet (2 mg total) by mouth On call Procedure., Disp: 2 tablet, Rfl: 0    gabapentin (NEURONTIN) 100 MG capsule, Take 1 capsule (100 mg total) by mouth 3 (three) times daily. (Patient not taking: Reported on  1/31/2025), Disp: 90 capsule, Rfl: 11    meloxicam (MOBIC) 15 MG tablet, Take 1 tablet (15 mg total) by mouth once daily. (Patient not taking: Reported on 1/31/2025), Disp: 30 tablet, Rfl: 3    naproxen (NAPROSYN) 500 MG tablet, TAKE 1 TABLET (500 MG TOTAL) BY MOUTH 2 (TWO) TIMES DAILY AS NEEDED (PAIN). TAKE WITH MEAL (Patient not taking: Reported on 1/31/2025), Disp: 60 tablet, Rfl: 3  ALLERGIES: Review of patient's allergies indicates:  No Known Allergies    VITAL SIGNS: /75 (BP Location: Left arm, Patient Position: Sitting)   Pulse 94   Wt 98 kg (215 lb 15.1 oz)   BMI 43.61 kg/m²                                                                               PHYSICAL EXAMINATION:     Incision sites healed well  No evidence of any erythema, infection or induration  elbow Range of motion full   No effusion  2+ Radial pulses  No swelling    ++ radicular pain to c6-7         ROM:      Forward Elevation: Active 120 Passive 140  ER: 35  IR: L2    Strength  [place] at 0 and 30: 5/5 *with Pain  ER: 5/5                                                                          ASSESSMENT:                                                                                                                                               1. Status post above, PT denied by Work Comp.      2. Cervical radiculopathy C6-7                                                                                                                            PLAN:                                                                                                                                                     1.PT IS MEDICALLY NECESSARY TO CONTINUE PT FOR RANGE OF MOTION AND STRENGTHENING AND REHABILITATION.   Continued PT is MEDICALLY NECESSARY for her rehabilitation after her right shoulder surgery, probable duration will be 4-6 months from now.  Any denial of this continued physical therapy would constitute a significant insult to .  Julian's recovery and certainly not within the standard of care.     2.  Follow up in 4 months     3. Meeting scheduled with rehab department 5/7/25 13.00 hours to continue PT/rehab for patient.

## 2025-09-05 ENCOUNTER — HOSPITAL ENCOUNTER (OUTPATIENT)
Dept: RADIOLOGY | Facility: HOSPITAL | Age: 55
Discharge: HOME | End: 2025-09-05
Attending: ORTHOPAEDIC SURGERY

## 2025-09-05 ENCOUNTER — OFFICE VISIT (OUTPATIENT)
Dept: SPORTS MEDICINE | Facility: CLINIC | Age: 55
End: 2025-09-05

## 2025-09-05 VITALS — DIASTOLIC BLOOD PRESSURE: 94 MMHG | HEART RATE: 89 BPM | SYSTOLIC BLOOD PRESSURE: 151 MMHG

## 2025-09-05 DIAGNOSIS — M75.101 NONTRAUMATIC ROTATOR CUFF TEAR, RIGHT: ICD-10-CM

## 2025-09-05 DIAGNOSIS — M25.511 CHRONIC RIGHT SHOULDER PAIN: ICD-10-CM

## 2025-09-05 DIAGNOSIS — G89.29 CHRONIC RIGHT SHOULDER PAIN: ICD-10-CM

## 2025-09-05 DIAGNOSIS — M25.511 RIGHT SHOULDER PAIN, UNSPECIFIED CHRONICITY: Primary | ICD-10-CM

## 2025-09-05 DIAGNOSIS — M25.511 RIGHT SHOULDER PAIN, UNSPECIFIED CHRONICITY: ICD-10-CM

## 2025-09-05 PROCEDURE — 73030 X-RAY EXAM OF SHOULDER: CPT | Mod: TC,RT

## 2025-09-05 PROCEDURE — 99999 PR PBB SHADOW E&M-EST. PATIENT-LVL III: CPT | Mod: PBBFAC,,, | Performed by: ORTHOPAEDIC SURGERY

## 2025-09-05 PROCEDURE — 99213 OFFICE O/P EST LOW 20 MIN: CPT | Mod: PBBFAC,25 | Performed by: ORTHOPAEDIC SURGERY

## (undated) DEVICE — NDL SPINAL 18GX3.5 SPINOCAN

## (undated) DEVICE — DRAPE STERI-DRAPE 1000 17X11IN

## (undated) DEVICE — SUT MCRYL PLUS 4-0 PS2 27IN

## (undated) DEVICE — PAD DERMAPROX THCK 11X15X1IN

## (undated) DEVICE — SOL NACL IRR 1000ML BTL

## (undated) DEVICE — PAD ABD 8X10 STERILE

## (undated) DEVICE — Device

## (undated) DEVICE — GLOVE BIOGEL SKINSENSE PI 7.0

## (undated) DEVICE — GOWN ECLIPSE REINF LVL4 TWL XL

## (undated) DEVICE — KIT SHOULDER POSITIONER SPIDER

## (undated) DEVICE — SPONGE LAP 18X18 PREWASHED

## (undated) DEVICE — NDL HYPO REG 25G X 1 1/2

## (undated) DEVICE — NDL SCORPIAN SUTURE PASSER

## (undated) DEVICE — UNDERGLOVES BIOGEL PI SIZE 7.5

## (undated) DEVICE — SYR 30CC LUER LOCK

## (undated) DEVICE — CONNECTOR TUBING STR 5 IN 1

## (undated) DEVICE — BNDG COFLEX FOAM LF2 ST 4X5YD

## (undated) DEVICE — APPLICATOR CHLORAPREP ORN 26ML

## (undated) DEVICE — CANNULA ARTHRO 8.25MM X 7CM

## (undated) DEVICE — IMPLANTABLE DEVICE
Type: IMPLANTABLE DEVICE | Site: SHOULDER | Status: NON-FUNCTIONAL
Removed: 2023-10-03

## (undated) DEVICE — ELECTRODE REM PLYHSV RETURN 9

## (undated) DEVICE — GRAFT SPREADER

## (undated) DEVICE — CANNULA ARTHRO NON THREAD

## (undated) DEVICE — WRAP SHLDR HIP ACCU THRM PACK

## (undated) DEVICE — PAD ELECTRODE STER 1.5X3

## (undated) DEVICE — NANO NEEDLE SCOPE, 125MM

## (undated) DEVICE — BLADE SHAVER 4.5 6/BX

## (undated) DEVICE — SUT FIBERWIRE 0 38 W/NDL

## (undated) DEVICE — DRESSING XEROFORM FOIL PK 1X8

## (undated) DEVICE — GLOVE SURGEON SYN PF SZ 9

## (undated) DEVICE — CLOSURE SKIN STERI STRIP 1/2X4

## (undated) DEVICE — TUBING SUC UNIV W/CONN 12FT

## (undated) DEVICE — GOWN ECLIPSE REINF LV4 TWL 2XL

## (undated) DEVICE — BUTTON PASSPORT CANN 2X4CM

## (undated) DEVICE — PROBE ARTHO ENERGY 90 DEG

## (undated) DEVICE — DRESSING XEROFORM NONADH 1X8IN

## (undated) DEVICE — SUPPORT SLING SHOT II MEDIUM

## (undated) DEVICE — SEE MEDLINE ITEM 153688

## (undated) DEVICE — TIGER LINK

## (undated) DEVICE — ADHESIVE MASTISOL VIAL 48/BX

## (undated) DEVICE — PAD ABDOMINAL STERILE 8X10IN

## (undated) DEVICE — GLOVE ORTHO PF SZ 8.5

## (undated) DEVICE — NDL 18GA X1 1/2 REG BEVEL

## (undated) DEVICE — SOL NACL IRR 3000ML

## (undated) DEVICE — STRIP MEDI WND CLSR 1/2X4IN

## (undated) DEVICE — BNDG COFLEX FOAM LF2 ST 6X5YD

## (undated) DEVICE — TOWEL OR DISP STRL BLUE 4/PK

## (undated) DEVICE — GAUZE SPONGE 4X4 12PLY

## (undated) DEVICE — TUBE SET INFLOW/OUTFLOW

## (undated) DEVICE — DRAPE STERI INSTRUMENT 1018

## (undated) DEVICE — SYR B-D DISP CONTROL 10CC100/C

## (undated) DEVICE — CUTTER SUT KNOT PUSH PRTL SKID

## (undated) DEVICE — DRAPE STERI U-SHAPED 47X51IN